# Patient Record
Sex: MALE | Race: WHITE | NOT HISPANIC OR LATINO | Employment: OTHER | URBAN - NONMETROPOLITAN AREA
[De-identification: names, ages, dates, MRNs, and addresses within clinical notes are randomized per-mention and may not be internally consistent; named-entity substitution may affect disease eponyms.]

---

## 2020-07-27 PROBLEM — R05.9 COUGH: Status: ACTIVE | Noted: 2020-07-27

## 2020-07-27 PROBLEM — Z20.822 EXPOSURE TO COVID-19 VIRUS: Status: ACTIVE | Noted: 2020-07-27

## 2020-07-30 ENCOUNTER — OFFICE VISIT (OUTPATIENT)
Dept: INTERNAL MEDICINE | Facility: CLINIC | Age: 80
End: 2020-07-30

## 2020-07-30 DIAGNOSIS — I10 ESSENTIAL HYPERTENSION: ICD-10-CM

## 2020-07-30 DIAGNOSIS — E53.8 COBALAMIN DEFICIENCY: ICD-10-CM

## 2020-07-30 DIAGNOSIS — Z12.5 ENCOUNTER FOR SCREENING FOR MALIGNANT NEOPLASM OF PROSTATE: ICD-10-CM

## 2020-07-30 DIAGNOSIS — R79.89 LOW VITAMIN D LEVEL: ICD-10-CM

## 2020-07-30 DIAGNOSIS — E03.9 ACQUIRED HYPOTHYROIDISM: ICD-10-CM

## 2020-07-30 DIAGNOSIS — E78.41 ELEVATED LIPOPROTEIN(A): Primary | ICD-10-CM

## 2020-07-30 PROCEDURE — 99213 OFFICE O/P EST LOW 20 MIN: CPT | Performed by: INTERNAL MEDICINE

## 2020-07-30 NOTE — PROGRESS NOTES
Subjective  You have chosen to receive care through a telephone visit. Do you consent to use a telephone visit for your medical care today? Yes    Patient ID: Cipriano Montes is a 80 y.o. male. Patient is here for management of multiple medical problems.     No chief complaint on file.    History of Present Illness       Pt with persistent pain in left side. Cough makes pain worse.  3 weeks of cough from post nasal drip.    No color.  Thick.    Better with mucinex and zyrtec.      No worseing soa.     soa if coughing a lot.     Using albuterol once a day.     Hx of asthma. Pt was living [place] x 4 years. Just returned to [place] July.       Seen in [facility]           The following portions of the patient's history were reviewed and updated as appropriate: allergies, current medications, past family history, past medical history, past social history, past surgical history and problem list.    Review of Systems   Constitutional: Negative for fatigue.   Respiratory: Negative for cough and shortness of breath.    Musculoskeletal: Negative for back pain and gait problem.   Psychiatric/Behavioral: Negative for self-injury and sleep disturbance. The patient is not nervous/anxious.    All other systems reviewed and are negative.      Current Outpatient Medications:   •  albuterol (VENTOLIN HFA) 108 (90 BASE) MCG/ACT inhaler, Ventolin  (90 Base) MCG/ACT Inhalation Aerosol Solution; Patient Sig: Ventolin  (90 Base) MCG/ACT Inhalation Aerosol Solution 1-2 pufff q 2-6 prn; 90; 3; 26-Mar-2013; Active, Disp: , Rfl:   •  cetirizine (ZYRTEC ALLERGY) 10 MG tablet, Take 1 tablet by mouth every morning. As needed, Disp: , Rfl:   •  Cyanocobalamin 2500 MCG sublingual tablet, Place 1 tablet under the tongue daily., Disp: , Rfl:   •  guaiFENesin (MUCINEX) 600 MG 12 hr tablet, Take 2 tablets by mouth 2 (two) times a day., Disp: , Rfl:   •  levothyroxine (SYNTHROID, LEVOTHROID) 100 MCG tablet, TAKE 1 TABLET BY MOUTH ONCE DAILY AS  "DIRECTED, Disp: 90 tablet, Rfl: 0  •  SBI/Protein Isolate (ENTERAGAM) 5 G pack, Take 1 packet by mouth daily. With 4 ounces of water, Disp: , Rfl:   •  Syringe/Needle, Disp, (B-D 3CC LUER-CAITLIN SYR 25GX1\") 25G X 1\" 3 ML misc, , Disp: , Rfl:   •  tamsulosin (FLOMAX) 0.4 MG capsule 24 hr capsule, Take 1 capsule by mouth daily., Disp: 90 capsule, Rfl: 3    Objective      There were no vitals taken for this visit.    Physical Exam     General Appearance:    Alert, cooperative, no distress, appears stated age   Head:     Eyes:     Ears:     Nose:    Throat:    Neck:    Back:      Lungs:      Chest wall:     Heart:     Abdomen:      Extremities:    Pulses:    Skin:    Lymph nodes:    Neurologic:       Results for orders placed or performed during the hospital encounter of 07/27/20   POC Urinalysis Dipstick, Multipro (Automated dipstick)   Result Value Ref Range    Color Winnie Yellow, Straw, Dark Yellow, Winnie    Clarity, UA Clear Clear    Glucose, UA Negative Negative, 1000 mg/dL (3+) mg/dL    Bilirubin Negative Negative    Ketones, UA Negative Negative    Specific Gravity  1.030 1.005 - 1.030    Blood, UA Negative Negative    pH, Urine 6.0 5.0 - 8.0    Protein, POC Negative Negative mg/dL    Urobilinogen, UA Normal Normal    Nitrite, UA Negative Negative    Leukocytes Negative Negative         Assessment/Plan     Pt wants to stay with the zyrtec.   Pt improving.     Afrin at night for drainage.            Diagnoses and all orders for this visit:    Elevated lipoprotein(a)  -     TSH  -     T4, Free  -     Comprehensive Metabolic Panel  -     Vitamin B12  -     CBC & Differential  -     Lipid Panel    Essential hypertension  -     TSH  -     T4, Free  -     Comprehensive Metabolic Panel  -     Vitamin B12  -     CBC & Differential  -     Lipid Panel    Low vitamin D level  -     TSH  -     T4, Free  -     Comprehensive Metabolic Panel  -     Vitamin B12  -     CBC & Differential  -     Lipid Panel    Acquired " hypothyroidism  -     TSH  -     T4, Free  -     Comprehensive Metabolic Panel  -     Vitamin B12  -     CBC & Differential  -     Lipid Panel    Cobalamin deficiency  -     TSH  -     T4, Free  -     Comprehensive Metabolic Panel  -     Vitamin B12  -     CBC & Differential  -     Lipid Panel    Encounter for screening for malignant neoplasm of prostate   -     PSA Screen    pt will wean off inj b12 and change to oral b12.       Pt will go to Dr Goncalves for rib head adjustment. [facility] with neg urin . covid test pending. Likely not covid.     Telephone time 12 min.    Return in about 6 weeks (around 9/10/2020).          There are no Patient Instructions on file for this visit.     Ron Velazquez MD    Assessment/Plan

## 2020-08-02 ENCOUNTER — TELEPHONE (OUTPATIENT)
Dept: URGENT CARE | Facility: CLINIC | Age: 80
End: 2020-08-02

## 2020-08-02 DIAGNOSIS — R10.9 ACUTE LEFT FLANK PAIN: Primary | ICD-10-CM

## 2020-08-02 DIAGNOSIS — N30.00 ACUTE CYSTITIS WITHOUT HEMATURIA: ICD-10-CM

## 2020-08-02 RX ORDER — CIPROFLOXACIN 500 MG/1
TABLET, FILM COATED ORAL
Qty: 14 TABLET | Refills: 0 | Status: SHIPPED | OUTPATIENT
Start: 2020-08-02 | End: 2020-08-29

## 2020-08-29 ENCOUNTER — OFFICE VISIT (OUTPATIENT)
Dept: INTERNAL MEDICINE | Facility: CLINIC | Age: 80
End: 2020-08-29

## 2020-08-29 VITALS
DIASTOLIC BLOOD PRESSURE: 78 MMHG | HEART RATE: 77 BPM | WEIGHT: 219.12 LBS | RESPIRATION RATE: 16 BRPM | TEMPERATURE: 98 F | HEIGHT: 70 IN | OXYGEN SATURATION: 98 % | BODY MASS INDEX: 31.37 KG/M2 | SYSTOLIC BLOOD PRESSURE: 134 MMHG

## 2020-08-29 DIAGNOSIS — J30.1 ALLERGIC RHINITIS DUE TO POLLEN, UNSPECIFIED SEASONALITY: Primary | ICD-10-CM

## 2020-08-29 DIAGNOSIS — I10 ESSENTIAL HYPERTENSION: ICD-10-CM

## 2020-08-29 DIAGNOSIS — Z87.81 HX OF FRACTURE OF ANKLE: ICD-10-CM

## 2020-08-29 DIAGNOSIS — R25.1 TREMOR: ICD-10-CM

## 2020-08-29 DIAGNOSIS — R53.83 FATIGUE, UNSPECIFIED TYPE: ICD-10-CM

## 2020-08-29 DIAGNOSIS — E78.41 ELEVATED LIPOPROTEIN(A): ICD-10-CM

## 2020-08-29 PROCEDURE — 99214 OFFICE O/P EST MOD 30 MIN: CPT | Performed by: INTERNAL MEDICINE

## 2020-08-29 PROCEDURE — 90653 IIV ADJUVANT VACCINE IM: CPT | Performed by: INTERNAL MEDICINE

## 2020-08-29 PROCEDURE — G0008 ADMIN INFLUENZA VIRUS VAC: HCPCS | Performed by: INTERNAL MEDICINE

## 2020-08-29 RX ORDER — NIFEDIPINE 60 MG/1
60 TABLET, EXTENDED RELEASE ORAL DAILY
Qty: 90 TABLET | Refills: 3 | Status: SHIPPED | OUTPATIENT
Start: 2020-08-29 | End: 2021-03-08 | Stop reason: SDUPTHER

## 2020-08-29 RX ORDER — NIFEDIPINE 60 MG/1
TABLET, EXTENDED RELEASE ORAL
COMMUNITY
End: 2020-08-29 | Stop reason: SDUPTHER

## 2020-08-29 RX ORDER — CYANOCOBALAMIN 1000 UG/ML
1000 INJECTION, SOLUTION INTRAMUSCULAR; SUBCUTANEOUS
COMMUNITY
End: 2022-05-11

## 2020-08-29 RX ORDER — ALBUTEROL SULFATE 90 UG/1
1 AEROSOL, METERED RESPIRATORY (INHALATION) EVERY 6 HOURS PRN
Qty: 1 G | Refills: 3 | Status: SHIPPED | OUTPATIENT
Start: 2020-08-29 | End: 2021-10-21

## 2020-08-29 NOTE — PROGRESS NOTES
Subjective     Patient ID: Cipriano Montes is a 80 y.o. male. Patient is here for management of multiple medical problems.     Chief Complaint   Patient presents with   • Hypothyroidism     follow-up   • Hypertension     follow-up     History of Present Illness       htn      hypthyroid      Just moved back for m [place]        Pain in side.  Chiropractor not helpful.      Recent uti. Had abx.      Recent r foot fx and had to have foot reattched.  Fell of scafolding feel and got a coumpound fx.  Om and IV abx.    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        The following portions of the patient's history were reviewed and updated as appropriate: allergies, current medications, past family history, past medical history, past social history, past surgical history and problem list.    Review of Systems   Constitutional: Positive for fatigue.   HENT: Positive for congestion, rhinorrhea and sinus pain.    Psychiatric/Behavioral: Negative for self-injury and sleep disturbance. The patient is not nervous/anxious and is not hyperactive.    All other systems reviewed and are negative.      Current Outpatient Medications:   •  albuterol sulfate HFA (Ventolin HFA) 108 (90 Base) MCG/ACT inhaler, Inhale 1 puff Every 6 (Six) Hours As Needed for Wheezing., Disp: 1 g, Rfl: 3  •  cetirizine (ZYRTEC ALLERGY) 10 MG tablet, Take 1 tablet by mouth every morning. As needed, Disp: , Rfl:   •  cyanocobalamin 1000 MCG/ML injection, Inject 1,000 mcg into the appropriate muscle as directed by prescriber Every 28 (Twenty-Eight) Days., Disp: , Rfl:   •  guaiFENesin (MUCINEX) 600 MG 12 hr tablet, Take 2 tablets by mouth 2 (two) times a day., Disp: , Rfl:   •  levothyroxine (SYNTHROID, LEVOTHROID) 100 MCG tablet, TAKE 1 TABLET BY MOUTH ONCE DAILY AS DIRECTED, Disp: 90 tablet, Rfl: 0  •  NIFEdipine XL (PROCARDIA XL) 60 MG 24 hr tablet, Take 1 tablet by mouth Daily., Disp: 90 tablet, Rfl: 3  •  Syringe/Needle, Disp, ([facility]  "SYR 25GX1\") 25G X 1\" 3 ML misc, , Disp: , Rfl:   •  tamsulosin (FLOMAX) 0.4 MG capsule 24 hr capsule, Take 1 capsule by mouth daily., Disp: 90 capsule, Rfl: 3    Objective      Blood pressure 134/78, pulse 77, temperature 98 °F (36.7 °C), temperature source Temporal, resp. rate 16, height 177.8 cm (70\"), weight 99.4 kg (219 lb 1.9 oz), SpO2 98 %.    Physical Exam     General Appearance:    Alert, cooperative, no distress, appears stated age   Head:    Normocephalic, without obvious abnormality, atraumatic   Eyes:    PERRL, conjunctiva/corneas clear, EOM's intact   Ears:    Normal TM's and external ear canals, both ears   Nose:   Nares normal, septum midline, mucosa normal, no drainage   or sinus tenderness   Throat:   Lips, mucosa, and tongue normal; teeth and gums normal   Neck:   Supple, symmetrical, trachea midline, no adenopathy;        thyroid:  No enlargement/tenderness/nodules; no carotid    bruit or JVD   Back:     Symmetric, no curvature, ROM normal, no CVA tenderness   Lungs:     Clear to auscultation bilaterally, respirations unlabored   Chest wall:    No tenderness or deformity   Heart:    Regular rate and rhythm, S1 and S2 normal, no murmur,        rub or gallop   Abdomen:     Soft, non-tender, bowel sounds active all four quadrants,     no masses, no organomegaly   Extremities:   Right ankle in brace.      Pulses:   2+ and symmetric all extremities   Skin:   Skin color, texture, turgor normal, no rashes or lesions   Lymph nodes:   Cervical, supraclavicular, and axillary nodes normal   Neurologic:   CNII-XII intact. Normal strength, sensation and reflexes       throughout      Results for orders placed or performed during the hospital encounter of 07/27/20   COVID-19,[facility] ROUTINE, NP/OP SWAB IN TRANSPORT MEDIA OR ESWAB 72 HR TAT - Swab, Oropharynx   Result Value Ref Range    SARS-CoV-2, MARY Not Detected Not Detected   Urine Culture - Urine, Urine, Clean Catch   Result Value Ref Range    Urine Culture " Final report (A)     Result 1 Comment (A)     Susceptibility Testing Comment    POC Urinalysis Dipstick, Multipro (Automated dipstick)   Result Value Ref Range    Color Winnie Yellow, Straw, Dark Yellow, Winnie    Clarity, UA Clear Clear    Glucose, UA Negative Negative, 1000 mg/dL (3+) mg/dL    Bilirubin Negative Negative    Ketones, UA Negative Negative    Specific Gravity  1.030 1.005 - 1.030    Blood, UA Negative Negative    pH, Urine 6.0 5.0 - 8.0    Protein, POC Negative Negative mg/dL    Urobilinogen, UA Normal Normal    Nitrite, UA Negative Negative    Leukocytes Negative Negative         Assessment/Plan   Pt need to get his labs done.        Cipriano was seen today for hypothyroidism and hypertension.    Diagnoses and all orders for this visit:    Allergic rhinitis due to pollen, unspecified seasonality  -     Vitamin B12  -     Vitamin B6  -     Vitamin B1, Whole Blood  -     [place] Spotted Fever, IgM  -     [place] Spotted Fever, IgG  -     Ehrlichia Antibody Panel  -     Lyme Disease, PCR    Hx of fracture of ankle  -     Vitamin B12  -     Vitamin B6  -     Vitamin B1, Whole Blood  -     [place] Spotted Fever, IgM  -     [place] Spotted Fever, IgG  -     Ehrlichia Antibody Panel  -     Lyme Disease, PCR    Essential hypertension  -     Vitamin B12  -     Vitamin B6  -     Vitamin B1, Whole Blood  -     [place] Spotted Fever, IgM  -     [place] Spotted Fever, IgG  -     Ehrlichia Antibody Panel  -     Lyme Disease, PCR    Elevated lipoprotein(a)  -     Vitamin B12  -     Vitamin B6  -     Vitamin B1, Whole Blood  -     [place] Spotted Fever, IgM  -     [place] Spotted Fever, IgG  -     Ehrlichia Antibody Panel  -     Lyme Disease, PCR    Fatigue, unspecified type  -     Vitamin B12  -     Vitamin B6  -     Vitamin B1, Whole Blood  -     [place] Spotted Fever, IgM  -     [place] Spotted Fever, IgG  -     Ehrlichia Antibody Panel  -     Lyme Disease, PCR    Tremor  -      Vitamin B12  -     Vitamin B6  -     Vitamin B1, Whole Blood  -     [place] Spotted Fever, IgM  -     [place] Spotted Fever, IgG  -     Ehrlichia Antibody Panel  -     Lyme Disease, PCR    Other orders  -     NIFEdipine XL (PROCARDIA XL) 60 MG 24 hr tablet; Take 1 tablet by mouth Daily.  -     albuterol sulfate HFA (Ventolin HFA) 108 (90 Base) MCG/ACT inhaler; Inhale 1 puff Every 6 (Six) Hours As Needed for Wheezing.  -     Fluad Quad 65+ yrs (8845-6079)      Return in about 6 weeks (around 10/10/2020).          There are no Patient Instructions on file for this visit.     Ron Velazquez MD    Assessment/Plan

## 2020-10-01 ENCOUNTER — TELEPHONE (OUTPATIENT)
Dept: INTERNAL MEDICINE | Facility: CLINIC | Age: 80
End: 2020-10-01

## 2020-10-01 NOTE — TELEPHONE ENCOUNTER
Caller: Cipriano Montes    Relationship: Self    Best call back number: [phone]    What orders are you requesting (i.e. lab or imaging): BLOOD WORK    In what timeframe would the patient need to come in: SOON    Where will you receive your lab/imaging services: [facility]    Additional notes: PATIENT'S APPOINTMENT IS 10/27

## 2020-10-05 ENCOUNTER — LAB (OUTPATIENT)
Dept: LAB | Facility: HOSPITAL | Age: 80
End: 2020-10-05

## 2020-10-05 LAB
ALBUMIN SERPL-MCNC: 4.1 G/DL (ref 3.5–5.2)
ALBUMIN/GLOB SERPL: 1.3 G/DL
ALP SERPL-CCNC: 137 U/L (ref 39–117)
ALT SERPL W P-5'-P-CCNC: 89 U/L (ref 1–41)
ANION GAP SERPL CALCULATED.3IONS-SCNC: 9.4 MMOL/L (ref 5–15)
AST SERPL-CCNC: 56 U/L (ref 1–40)
BASOPHILS # BLD AUTO: 0.06 10*3/MM3 (ref 0–0.2)
BASOPHILS NFR BLD AUTO: 0.9 % (ref 0–1.5)
BILIRUB SERPL-MCNC: 0.6 MG/DL (ref 0–1.2)
BUN SERPL-MCNC: 16 MG/DL (ref 8–23)
BUN/CREAT SERPL: 12.9 (ref 7–25)
CALCIUM SPEC-SCNC: 9.3 MG/DL (ref 8.6–10.5)
CHLORIDE SERPL-SCNC: 108 MMOL/L (ref 98–107)
CHOLEST SERPL-MCNC: 191 MG/DL (ref 0–200)
CO2 SERPL-SCNC: 24.6 MMOL/L (ref 22–29)
CREAT SERPL-MCNC: 1.24 MG/DL (ref 0.76–1.27)
DEPRECATED RDW RBC AUTO: 41.9 FL (ref 37–54)
EOSINOPHIL # BLD AUTO: 0.25 10*3/MM3 (ref 0–0.4)
EOSINOPHIL NFR BLD AUTO: 3.7 % (ref 0.3–6.2)
ERYTHROCYTE [DISTWIDTH] IN BLOOD BY AUTOMATED COUNT: 13.3 % (ref 12.3–15.4)
GFR SERPL CREATININE-BSD FRML MDRD: 56 ML/MIN/1.73
GLOBULIN UR ELPH-MCNC: 3.2 GM/DL
GLUCOSE SERPL-MCNC: 105 MG/DL (ref 65–99)
HCT VFR BLD AUTO: 47.4 % (ref 37.5–51)
HDLC SERPL-MCNC: 46 MG/DL (ref 40–60)
HGB BLD-MCNC: 16 G/DL (ref 13–17.7)
IMM GRANULOCYTES # BLD AUTO: 0.01 10*3/MM3 (ref 0–0.05)
IMM GRANULOCYTES NFR BLD AUTO: 0.1 % (ref 0–0.5)
LDLC SERPL CALC-MCNC: 124 MG/DL (ref 0–100)
LDLC/HDLC SERPL: 2.7 {RATIO}
LYMPHOCYTES # BLD AUTO: 1.32 10*3/MM3 (ref 0.7–3.1)
LYMPHOCYTES NFR BLD AUTO: 19.4 % (ref 19.6–45.3)
MCH RBC QN AUTO: 29.6 PG (ref 26.6–33)
MCHC RBC AUTO-ENTMCNC: 33.8 G/DL (ref 31.5–35.7)
MCV RBC AUTO: 87.6 FL (ref 79–97)
MONOCYTES # BLD AUTO: 0.7 10*3/MM3 (ref 0.1–0.9)
MONOCYTES NFR BLD AUTO: 10.3 % (ref 5–12)
NEUTROPHILS NFR BLD AUTO: 4.45 10*3/MM3 (ref 1.7–7)
NEUTROPHILS NFR BLD AUTO: 65.6 % (ref 42.7–76)
NRBC BLD AUTO-RTO: 0 /100 WBC (ref 0–0.2)
PLATELET # BLD AUTO: 208 10*3/MM3 (ref 140–450)
PMV BLD AUTO: 10.2 FL (ref 6–12)
POTASSIUM SERPL-SCNC: 4.2 MMOL/L (ref 3.5–5.2)
PROT SERPL-MCNC: 7.3 G/DL (ref 6–8.5)
PSA SERPL-MCNC: 0.71 NG/ML (ref 0–4)
RBC # BLD AUTO: 5.41 10*6/MM3 (ref 4.14–5.8)
SODIUM SERPL-SCNC: 142 MMOL/L (ref 136–145)
T4 FREE SERPL-MCNC: 1.41 NG/DL (ref 0.93–1.7)
TRIGL SERPL-MCNC: 105 MG/DL (ref 0–150)
VIT B12 BLD-MCNC: 409 PG/ML (ref 211–946)
VLDLC SERPL-MCNC: 21 MG/DL (ref 5–40)
WBC # BLD AUTO: 6.79 10*3/MM3 (ref 3.4–10.8)

## 2020-10-05 PROCEDURE — 36415 COLL VENOUS BLD VENIPUNCTURE: CPT | Performed by: INTERNAL MEDICINE

## 2020-10-05 PROCEDURE — 85025 COMPLETE CBC W/AUTO DIFF WBC: CPT | Performed by: INTERNAL MEDICINE

## 2020-10-05 PROCEDURE — 80061 LIPID PANEL: CPT | Performed by: INTERNAL MEDICINE

## 2020-10-05 PROCEDURE — 86666 EHRLICHIA ANTIBODY: CPT | Performed by: INTERNAL MEDICINE

## 2020-10-05 PROCEDURE — 82607 VITAMIN B-12: CPT | Performed by: INTERNAL MEDICINE

## 2020-10-05 PROCEDURE — 84425 ASSAY OF VITAMIN B-1: CPT | Performed by: INTERNAL MEDICINE

## 2020-10-05 PROCEDURE — G0103 PSA SCREENING: HCPCS | Performed by: INTERNAL MEDICINE

## 2020-10-05 PROCEDURE — 87476 LYME DIS DNA AMP PROBE: CPT | Performed by: INTERNAL MEDICINE

## 2020-10-05 PROCEDURE — 84439 ASSAY OF FREE THYROXINE: CPT | Performed by: INTERNAL MEDICINE

## 2020-10-05 PROCEDURE — 80053 COMPREHEN METABOLIC PANEL: CPT | Performed by: INTERNAL MEDICINE

## 2020-10-05 PROCEDURE — 86757 RICKETTSIA ANTIBODY: CPT | Performed by: INTERNAL MEDICINE

## 2020-10-06 LAB
R RICKETTSI IGG SER QL IA: NEGATIVE
SPECIMEN STATUS: NORMAL

## 2020-10-07 ENCOUNTER — OFFICE VISIT (OUTPATIENT)
Dept: INTERNAL MEDICINE | Facility: CLINIC | Age: 80
End: 2020-10-07

## 2020-10-07 VITALS
OXYGEN SATURATION: 97 % | SYSTOLIC BLOOD PRESSURE: 133 MMHG | HEIGHT: 70 IN | HEART RATE: 99 BPM | RESPIRATION RATE: 16 BRPM | TEMPERATURE: 98.2 F | BODY MASS INDEX: 32.09 KG/M2 | WEIGHT: 224.12 LBS | DIASTOLIC BLOOD PRESSURE: 78 MMHG

## 2020-10-07 DIAGNOSIS — R74.8 ELEVATED LIVER ENZYMES: ICD-10-CM

## 2020-10-07 DIAGNOSIS — J06.9 UPPER RESPIRATORY TRACT INFECTION, UNSPECIFIED TYPE: Primary | ICD-10-CM

## 2020-10-07 DIAGNOSIS — R05.9 COUGH: ICD-10-CM

## 2020-10-07 DIAGNOSIS — J18.9 COMMUNITY ACQUIRED PNEUMONIA OF RIGHT MIDDLE LOBE OF LUNG: ICD-10-CM

## 2020-10-07 DIAGNOSIS — E03.9 ACQUIRED HYPOTHYROIDISM: ICD-10-CM

## 2020-10-07 LAB
A PHAGOCYTOPH IGG TITR SER IF: NEGATIVE {TITER}
A PHAGOCYTOPH IGM TITR SER IF: NEGATIVE {TITER}
E CHAFFEENSIS IGG TITR SER IF: NEGATIVE {TITER}
E CHAFFEENSIS IGM TITR SER IF: NEGATIVE {TITER}

## 2020-10-07 PROCEDURE — 99214 OFFICE O/P EST MOD 30 MIN: CPT | Performed by: INTERNAL MEDICINE

## 2020-10-07 RX ORDER — IPRATROPIUM BROMIDE 42 UG/1
2 SPRAY, METERED NASAL
Qty: 15 ML | Refills: 12 | Status: ON HOLD | OUTPATIENT
Start: 2020-10-07 | End: 2022-09-30

## 2020-10-07 RX ORDER — TAMSULOSIN HYDROCHLORIDE 0.4 MG/1
1 CAPSULE ORAL DAILY
Qty: 90 CAPSULE | Refills: 3 | Status: SHIPPED | OUTPATIENT
Start: 2020-10-07 | End: 2021-10-14

## 2020-10-07 RX ORDER — LEVOTHYROXINE SODIUM 0.1 MG/1
100 TABLET ORAL DAILY
Qty: 90 TABLET | Refills: 3 | Status: SHIPPED | OUTPATIENT
Start: 2020-10-07 | End: 2021-10-14

## 2020-10-07 RX ORDER — DOXYCYCLINE 100 MG/1
100 CAPSULE ORAL EVERY 12 HOURS SCHEDULED
Qty: 28 CAPSULE | Refills: 0 | Status: SHIPPED | OUTPATIENT
Start: 2020-10-07 | End: 2020-11-09

## 2020-10-07 NOTE — PROGRESS NOTES
"Subjective     Patient ID: Cipriano Montes is a 80 y.o. male. Patient is here for management of multiple medical problems.     Chief Complaint   Patient presents with   • Fatigue     follow-up   • sinus drainage     patient having increased sinus drainage, patient has tried Mucinex and Zyrtec     History of Present Illness       Sinus drainge and pain in pressure.  Pnd. At night worse.  Cough violent.  Soa.  Pain in mid back pain with coughing.    Using zyrtec.    flonase      The following portions of the patient's history were reviewed and updated as appropriate: allergies, current medications, past family history, past medical history, past social history, past surgical history and problem list.    Review of Systems   Constitutional: Positive for fatigue.   Respiratory: Positive for cough and shortness of breath.    Psychiatric/Behavioral: Positive for sleep disturbance. Negative for self-injury. The patient is not nervous/anxious and is not hyperactive.    All other systems reviewed and are negative.      Current Outpatient Medications:   •  albuterol sulfate HFA (Ventolin HFA) 108 (90 Base) MCG/ACT inhaler, Inhale 1 puff Every 6 (Six) Hours As Needed for Wheezing., Disp: 1 g, Rfl: 3  •  cetirizine (ZYRTEC ALLERGY) 10 MG tablet, Take 1 tablet by mouth every morning. As needed, Disp: , Rfl:   •  cyanocobalamin 1000 MCG/ML injection, Inject 1,000 mcg into the appropriate muscle as directed by prescriber Every 28 (Twenty-Eight) Days., Disp: , Rfl:   •  guaiFENesin (MUCINEX) 600 MG 12 hr tablet, Take 2 tablets by mouth 2 (two) times a day., Disp: , Rfl:   •  levothyroxine (SYNTHROID, LEVOTHROID) 100 MCG tablet, Take 1 tablet by mouth Daily., Disp: 90 tablet, Rfl: 3  •  NIFEdipine XL (PROCARDIA XL) 60 MG 24 hr tablet, Take 1 tablet by mouth Daily., Disp: 90 tablet, Rfl: 3  •  Syringe/Needle, Disp, (B-D 3CC LUER-CAITLIN SYR 25GX1\") 25G X 1\" 3 ML misc, , Disp: , Rfl:   •  tamsulosin (FLOMAX) 0.4 MG capsule 24 hr " "capsule, Take 1 capsule by mouth Daily., Disp: 90 capsule, Rfl: 3  •  doxycycline (MONODOX) 100 MG capsule, Take 1 capsule by mouth Every 12 (Twelve) Hours., Disp: 28 capsule, Rfl: 0  •  ipratropium (ATROVENT) 0.06 % nasal spray, 2 sprays into the nostril(s) as directed by provider every night at bedtime., Disp: 15 mL, Rfl: 12    Objective      Blood pressure 133/78, pulse 99, temperature 98.2 °F (36.8 °C), temperature source Temporal, resp. rate 16, height 177.8 cm (70\"), weight 102 kg (224 lb 1.9 oz), SpO2 97 %.    Physical Exam     General Appearance:    Alert, cooperative, no distress, appears stated age   Head:    Normocephalic, without obvious abnormality, atraumatic   Eyes:    PERRL, conjunctiva/corneas clear, EOM's intact   Ears:    Normal TM's and external ear canals, both ears   Nose:   Nares normal, septum midline, mucosa normal, no drainage   + sinus tenderness   Throat:   Lips, mucosa, and tongue normal; teeth and gums normal   Neck:   Supple, symmetrical, trachea midline, no adenopathy;        thyroid:  No enlargement/tenderness/nodules; no carotid    bruit or JVD   Back:     Symmetric, no curvature, ROM normal, no CVA tenderness   Lungs:     Clear to auscultation bilaterally, respirations unlabored   Chest wall:    No tenderness or deformity   Heart:    Regular rate and rhythm, S1 and S2 normal, no murmur,        rub or gallop   Abdomen:     Soft, non-tender, bowel sounds active all four quadrants,     no masses, no organomegaly   Extremities:   Extremities normal, atraumatic, no cyanosis or edema   Pulses:   2+ and symmetric all extremities   Skin:   Skin color, texture, turgor normal, no rashes or lesions   Lymph nodes:   Cervical, supraclavicular, and axillary nodes normal   Neurologic:   CNII-XII intact. Normal strength, sensation and reflexes       throughout      Results for orders placed or performed in visit on 08/29/20   [facility] Spotted Fever, IgG    Specimen: Blood   Result Value Ref Range "    RMSF IgG Negative Negative   Lyme Disease, PCR    Specimen: Lumbar Puncture; Cerebrospinal Fluid   Result Value Ref Range    Lyme Disease(B.burgdorferi)PCR     Specimen Status Report    Specimen: Lumbar Puncture; Cerebrospinal Fluid   Result Value Ref Range    Specimen Status Comment          Assessment/Plan       Cipriano was seen today for fatigue and sinus drainage.    Diagnoses and all orders for this visit:    Upper respiratory tract infection, unspecified type  -     ipratropium (ATROVENT) 0.06 % nasal spray; 2 sprays into the nostril(s) as directed by provider every night at bedtime.    Cough    Elevated liver enzymes  -     Comprehensive Metabolic Panel  -     US Liver; Future    Community acquired pneumonia of right middle lobe of lung  -     doxycycline (MONODOX) 100 MG capsule; Take 1 capsule by mouth Every 12 (Twelve) Hours.    Acquired hypothyroidism  -     levothyroxine (SYNTHROID, LEVOTHROID) 100 MCG tablet; Take 1 tablet by mouth Daily.    Other orders  -     tamsulosin (FLOMAX) 0.4 MG capsule 24 hr capsule; Take 1 capsule by mouth Daily.      Return in about 4 weeks (around 11/4/2020).          Patient Instructions   Afrin otc at night only.             Ron Velazquez MD    Assessment/Plan

## 2020-10-08 LAB
B BURGDOR DNA SPEC QL NAA+PROBE: NEGATIVE
R RICKETTSI IGM SER-ACNC: 0.97 INDEX (ref 0–0.89)

## 2020-10-09 LAB — VIT B1 BLD-SCNC: 126.9 NMOL/L (ref 66.5–200)

## 2020-10-13 ENCOUNTER — APPOINTMENT (OUTPATIENT)
Dept: GENERAL RADIOLOGY | Facility: HOSPITAL | Age: 80
End: 2020-10-13

## 2020-10-13 ENCOUNTER — HOSPITAL ENCOUNTER (EMERGENCY)
Facility: HOSPITAL | Age: 80
Discharge: HOME | End: 2020-10-13
Attending: EMERGENCY MEDICINE | Admitting: EMERGENCY MEDICINE

## 2020-10-13 VITALS
HEART RATE: 85 BPM | TEMPERATURE: 97.4 F | WEIGHT: 220 LBS | OXYGEN SATURATION: 96 % | BODY MASS INDEX: 31.5 KG/M2 | DIASTOLIC BLOOD PRESSURE: 78 MMHG | RESPIRATION RATE: 24 BRPM | HEIGHT: 70 IN | SYSTOLIC BLOOD PRESSURE: 132 MMHG

## 2020-10-13 DIAGNOSIS — J45.21 MILD INTERMITTENT ASTHMA WITH EXACERBATION: Primary | ICD-10-CM

## 2020-10-13 LAB
A-A DO2: 67.1 MMHG
ALBUMIN SERPL-MCNC: 4.6 G/DL (ref 3.5–5.2)
ALBUMIN/GLOB SERPL: 1.5 G/DL
ALP SERPL-CCNC: 137 U/L (ref 39–117)
ALT SERPL W P-5'-P-CCNC: 78 U/L (ref 1–41)
ANION GAP SERPL CALCULATED.3IONS-SCNC: 11.7 MMOL/L (ref 5–15)
ARTERIAL PATENCY WRIST A: POSITIVE
AST SERPL-CCNC: 42 U/L (ref 1–40)
ATMOSPHERIC PRESS: 738 MMHG
BASE EXCESS BLDA CALC-SCNC: -4.2 MMOL/L (ref 0–2)
BASOPHILS # BLD AUTO: 0.04 10*3/MM3 (ref 0–0.2)
BASOPHILS NFR BLD AUTO: 0.5 % (ref 0–1.5)
BDY SITE: ABNORMAL
BILIRUB SERPL-MCNC: 0.5 MG/DL (ref 0–1.2)
BUN SERPL-MCNC: 17 MG/DL (ref 8–23)
BUN/CREAT SERPL: 13.7 (ref 7–25)
CALCIUM SPEC-SCNC: 9.5 MG/DL (ref 8.6–10.5)
CHLORIDE SERPL-SCNC: 107 MMOL/L (ref 98–107)
CO2 SERPL-SCNC: 22.3 MMOL/L (ref 22–29)
COHGB MFR BLD: <0.6 % (ref 0–2)
CREAT SERPL-MCNC: 1.24 MG/DL (ref 0.76–1.27)
DEPRECATED RDW RBC AUTO: 44.1 FL (ref 37–54)
EOSINOPHIL # BLD AUTO: 0.18 10*3/MM3 (ref 0–0.4)
EOSINOPHIL NFR BLD AUTO: 2.1 % (ref 0.3–6.2)
ERYTHROCYTE [DISTWIDTH] IN BLOOD BY AUTOMATED COUNT: 13.6 % (ref 12.3–15.4)
GAS FLOW AIRWAY: 2 LPM
GFR SERPL CREATININE-BSD FRML MDRD: 56 ML/MIN/1.73
GLOBULIN UR ELPH-MCNC: 3.1 GM/DL
GLUCOSE SERPL-MCNC: 134 MG/DL (ref 65–99)
HCO3 BLDA-SCNC: 20.9 MMOL/L (ref 22–28)
HCT VFR BLD AUTO: 50.7 % (ref 37.5–51)
HCT VFR BLD CALC: 51.9 %
HGB BLD-MCNC: 17.1 G/DL (ref 13–17.7)
HOLD SPECIMEN: NORMAL
HOLD SPECIMEN: NORMAL
IMM GRANULOCYTES # BLD AUTO: 0.02 10*3/MM3 (ref 0–0.05)
IMM GRANULOCYTES NFR BLD AUTO: 0.2 % (ref 0–0.5)
INHALED O2 CONCENTRATION: 28 %
LYMPHOCYTES # BLD AUTO: 1.79 10*3/MM3 (ref 0.7–3.1)
LYMPHOCYTES NFR BLD AUTO: 20.7 % (ref 19.6–45.3)
MCH RBC QN AUTO: 29.9 PG (ref 26.6–33)
MCHC RBC AUTO-ENTMCNC: 33.7 G/DL (ref 31.5–35.7)
MCV RBC AUTO: 88.6 FL (ref 79–97)
METHGB BLD QL: 0.9 % (ref 0–1.5)
MODALITY: ABNORMAL
MONOCYTES # BLD AUTO: 0.74 10*3/MM3 (ref 0.1–0.9)
MONOCYTES NFR BLD AUTO: 8.6 % (ref 5–12)
NEUTROPHILS NFR BLD AUTO: 5.88 10*3/MM3 (ref 1.7–7)
NEUTROPHILS NFR BLD AUTO: 67.9 % (ref 42.7–76)
NOTE: ABNORMAL
NRBC BLD AUTO-RTO: 0 /100 WBC (ref 0–0.2)
NT-PROBNP SERPL-MCNC: 22.4 PG/ML (ref 0–1800)
OXYHGB MFR BLDV: 94.4 % (ref 94–99)
PCO2 BLDA: 38 MM HG (ref 35–45)
PCO2 TEMP ADJ BLD: ABNORMAL MM[HG]
PH BLDA: 7.35 PH UNITS (ref 7.3–7.5)
PH, TEMP CORRECTED: ABNORMAL
PLATELET # BLD AUTO: 229 10*3/MM3 (ref 140–450)
PMV BLD AUTO: 8.8 FL (ref 6–12)
PO2 BLDA: 84 MM HG (ref 75–100)
PO2 TEMP ADJ BLD: ABNORMAL MM[HG]
POTASSIUM SERPL-SCNC: 4.4 MMOL/L (ref 3.5–5.2)
PROT SERPL-MCNC: 7.7 G/DL (ref 6–8.5)
RBC # BLD AUTO: 5.72 10*6/MM3 (ref 4.14–5.8)
SAO2 % BLDCOA: 95.4 % (ref 94–100)
SODIUM SERPL-SCNC: 141 MMOL/L (ref 136–145)
TROPONIN T SERPL-MCNC: <0.01 NG/ML (ref 0–0.03)
VENTILATOR MODE: ABNORMAL
WBC # BLD AUTO: 8.65 10*3/MM3 (ref 3.4–10.8)
WHOLE BLOOD HOLD SPECIMEN: NORMAL
WHOLE BLOOD HOLD SPECIMEN: NORMAL

## 2020-10-13 PROCEDURE — 94799 UNLISTED PULMONARY SVC/PX: CPT

## 2020-10-13 PROCEDURE — 36600 WITHDRAWAL OF ARTERIAL BLOOD: CPT

## 2020-10-13 PROCEDURE — 82375 ASSAY CARBOXYHB QUANT: CPT

## 2020-10-13 PROCEDURE — 71046 X-RAY EXAM CHEST 2 VIEWS: CPT

## 2020-10-13 PROCEDURE — 83880 ASSAY OF NATRIURETIC PEPTIDE: CPT | Performed by: EMERGENCY MEDICINE

## 2020-10-13 PROCEDURE — 94640 AIRWAY INHALATION TREATMENT: CPT

## 2020-10-13 PROCEDURE — 99284 EMERGENCY DEPT VISIT MOD MDM: CPT

## 2020-10-13 PROCEDURE — 83050 HGB METHEMOGLOBIN QUAN: CPT

## 2020-10-13 PROCEDURE — 80053 COMPREHEN METABOLIC PANEL: CPT | Performed by: EMERGENCY MEDICINE

## 2020-10-13 PROCEDURE — 85025 COMPLETE CBC W/AUTO DIFF WBC: CPT | Performed by: EMERGENCY MEDICINE

## 2020-10-13 PROCEDURE — 96374 THER/PROPH/DIAG INJ IV PUSH: CPT

## 2020-10-13 PROCEDURE — 93005 ELECTROCARDIOGRAM TRACING: CPT | Performed by: EMERGENCY MEDICINE

## 2020-10-13 PROCEDURE — 25010000002 METHYLPREDNISOLONE PER 125 MG: Performed by: PHYSICIAN ASSISTANT

## 2020-10-13 PROCEDURE — 82805 BLOOD GASES W/O2 SATURATION: CPT

## 2020-10-13 PROCEDURE — 84484 ASSAY OF TROPONIN QUANT: CPT | Performed by: EMERGENCY MEDICINE

## 2020-10-13 RX ORDER — METHYLPREDNISOLONE SODIUM SUCCINATE 125 MG/2ML
125 INJECTION, POWDER, LYOPHILIZED, FOR SOLUTION INTRAMUSCULAR; INTRAVENOUS ONCE
Status: COMPLETED | OUTPATIENT
Start: 2020-10-13 | End: 2020-10-13

## 2020-10-13 RX ORDER — PREDNISONE 20 MG/1
20 TABLET ORAL 2 TIMES DAILY
Qty: 10 TABLET | Refills: 0 | Status: SHIPPED | OUTPATIENT
Start: 2020-10-13 | End: 2020-10-18

## 2020-10-13 RX ORDER — SODIUM CHLORIDE 0.9 % (FLUSH) 0.9 %
10 SYRINGE (ML) INJECTION AS NEEDED
Status: DISCONTINUED | OUTPATIENT
Start: 2020-10-13 | End: 2020-10-13 | Stop reason: HOSPADM

## 2020-10-13 RX ORDER — IPRATROPIUM BROMIDE AND ALBUTEROL SULFATE 2.5; .5 MG/3ML; MG/3ML
3 SOLUTION RESPIRATORY (INHALATION) ONCE
Status: COMPLETED | OUTPATIENT
Start: 2020-10-13 | End: 2020-10-13

## 2020-10-13 RX ADMIN — METHYLPREDNISOLONE SODIUM SUCCINATE 125 MG: 125 INJECTION, POWDER, FOR SOLUTION INTRAMUSCULAR; INTRAVENOUS at 12:21

## 2020-10-13 RX ADMIN — IPRATROPIUM BROMIDE AND ALBUTEROL SULFATE 3 ML: .5; 3 SOLUTION RESPIRATORY (INHALATION) at 11:29

## 2020-10-13 NOTE — ED PROVIDER NOTES
Subjective   80-year-old male presents with shortness of breath, he has a history of asthma and gets exacerbations from time to time, he uses an albuterol inhaler at home, but not oxygen.  He states he got up this morning and was working outside in his garden, and became short of breath, he tried to use his inhaler with no improvement.  He also is currently being treated for pneumonia with antibiotics.  Symptoms are worse with any kind of exertion.       History provided by:  Patient   used: No        Review of Systems   Respiratory: Positive for chest tightness, shortness of breath and wheezing.    All other systems reviewed and are negative.      Past Medical History:   Diagnosis Date   • Hypertension    • Hypothyroidism    • Thyroid disorder        Allergies   Allergen Reactions   • Oxycodone-Acetaminophen        Past Surgical History:   Procedure Laterality Date   • COLON SURGERY     • TONSILLECTOMY     • TONSILLECTOMY         Family History   Problem Relation Age of Onset   • Cancer Other    • Diabetes Other    • Prostate cancer Other    • Leukemia Mother    • Diabetes Father        Social History     Socioeconomic History   • Marital status:      Spouse name: Not on file   • Number of children: Not on file   • Years of education: Not on file   • Highest education level: Not on file   Tobacco Use   • Smoking status: Never Smoker   • Smokeless tobacco: Never Used   Substance and Sexual Activity   • Alcohol use: Yes     Comment: unknown   • Drug use: No           Objective   Physical Exam  Vitals signs and nursing note reviewed.   Constitutional:       Appearance: He is well-developed.   HENT:      Head: Normocephalic and atraumatic.   Cardiovascular:      Rate and Rhythm: Normal rate and regular rhythm.   Pulmonary:      Effort: Pulmonary effort is normal.      Breath sounds: Decreased breath sounds and wheezing present.   Abdominal:      General: Bowel sounds are normal.       Palpations: Abdomen is soft.   Musculoskeletal: Normal range of motion.   Skin:     General: Skin is warm and dry.   Neurological:      Mental Status: He is alert and oriented to person, place, and time.   Psychiatric:         Behavior: Behavior normal.         Thought Content: Thought content normal.         Judgment: Judgment normal.         Procedures           ED Course  ED Course as of Oct 13 1250   Tue Oct 13, 2020   1125 EKG interpreted by me reveals sinus tachycardia 104.  No ectopy no ischemic changes.    [PF]      ED Course User Index  [PF] Randy Rubio, DO                                           MDM  Number of Diagnoses or Management Options  Mild intermittent asthma with exacerbation: new and requires workup     Amount and/or Complexity of Data Reviewed  Clinical lab tests: reviewed  Tests in the radiology section of CPT®: reviewed    Risk of Complications, Morbidity, and/or Mortality  Presenting problems: minimal  Diagnostic procedures: minimal  Management options: minimal    Patient Progress  Patient progress: stable      Final diagnoses:   Mild intermittent asthma with exacerbation            Vic Guillen Jr., DANIEL  10/13/20 [id number]

## 2020-10-15 ENCOUNTER — HOSPITAL ENCOUNTER (OUTPATIENT)
Dept: ULTRASOUND IMAGING | Facility: HOSPITAL | Age: 80
Discharge: HOME | End: 2020-10-15
Admitting: INTERNAL MEDICINE

## 2020-10-15 DIAGNOSIS — R74.8 ELEVATED LIVER ENZYMES: ICD-10-CM

## 2020-10-15 PROCEDURE — 76705 ECHO EXAM OF ABDOMEN: CPT

## 2020-10-15 NOTE — PROGRESS NOTES
Please let them know the us liver is with fatty liver. Wt loss.  Stop all foods with high fructose corn syrup.

## 2020-11-03 ENCOUNTER — TELEPHONE (OUTPATIENT)
Dept: INTERNAL MEDICINE | Facility: CLINIC | Age: 80
End: 2020-11-03

## 2020-11-03 NOTE — TELEPHONE ENCOUNTER
PT WOULD LIKE TO KNOW IF HE WILL BE HAVING LABS DONE ON HIS APPT 11/9.      PLEASE ADVISE  [phone]

## 2020-11-09 ENCOUNTER — OFFICE VISIT (OUTPATIENT)
Dept: INTERNAL MEDICINE | Facility: CLINIC | Age: 80
End: 2020-11-09

## 2020-11-09 VITALS
OXYGEN SATURATION: 98 % | HEART RATE: 74 BPM | SYSTOLIC BLOOD PRESSURE: 123 MMHG | WEIGHT: 211.8 LBS | TEMPERATURE: 98.2 F | DIASTOLIC BLOOD PRESSURE: 64 MMHG | HEIGHT: 70 IN | RESPIRATION RATE: 16 BRPM | BODY MASS INDEX: 30.32 KG/M2

## 2020-11-09 DIAGNOSIS — K76.0 NAFLD (NONALCOHOLIC FATTY LIVER DISEASE): Primary | ICD-10-CM

## 2020-11-09 DIAGNOSIS — Z00.00 ROUTINE GENERAL MEDICAL EXAMINATION AT A HEALTH CARE FACILITY: ICD-10-CM

## 2020-11-09 DIAGNOSIS — G89.29 CHRONIC PAIN OF RIGHT ANKLE: ICD-10-CM

## 2020-11-09 DIAGNOSIS — M25.571 CHRONIC PAIN OF RIGHT ANKLE: ICD-10-CM

## 2020-11-09 PROCEDURE — 99213 OFFICE O/P EST LOW 20 MIN: CPT | Performed by: INTERNAL MEDICINE

## 2020-11-09 PROCEDURE — G0439 PPPS, SUBSEQ VISIT: HCPCS | Performed by: INTERNAL MEDICINE

## 2020-11-09 RX ORDER — SENNOSIDES 8.6 MG
CAPSULE ORAL EVERY 8 HOURS PRN
COMMUNITY

## 2020-11-09 NOTE — PROGRESS NOTES
"QUICK REFERENCE INFORMATION:  The ABCs of the Annual Wellness Visit    Medicare Annual Wellness Visit    Subjective   History of Present Illness    Cipriano Montes is a 80 y.o. male who presents for an Annual Wellness Visit. In addition, we addressed the following health issues nafld.    Chronic pain 3/10      PMH, PSH, SocHx, FamHx, Allergies, and Medications: Reviewed and updated.     Outpatient Medications Prior to Visit   Medication Sig Dispense Refill   • acetaminophen (Tylenol 8 Hour) 650 MG 8 hr tablet Every 6 (Six) Hours.     • albuterol sulfate HFA (Ventolin HFA) 108 (90 Base) MCG/ACT inhaler Inhale 1 puff Every 6 (Six) Hours As Needed for Wheezing. 1 g 3   • cetirizine (ZYRTEC ALLERGY) 10 MG tablet Take 1 tablet by mouth every morning. As needed     • cyanocobalamin 1000 MCG/ML injection Inject 1,000 mcg into the appropriate muscle as directed by prescriber Every 28 (Twenty-Eight) Days.     • ipratropium (ATROVENT) 0.06 % nasal spray 2 sprays into the nostril(s) as directed by provider every night at bedtime. 15 mL 12   • levothyroxine (SYNTHROID, LEVOTHROID) 100 MCG tablet Take 1 tablet by mouth Daily. 90 tablet 3   • Liver Extract (LIVER PO) Take 1 capsule by mouth 2 (two) times a day.     • NIFEdipine XL (PROCARDIA XL) 60 MG 24 hr tablet Take 1 tablet by mouth Daily. 90 tablet 3   • Probiotic Product (PROBIOTIC-10 PO) Probiotic   1 capsule daily     • Syringe/Needle, Disp, (B-D 3CC LUER-CAITLIN SYR 25GX1\") 25G X 1\" 3 ML misc      • tamsulosin (FLOMAX) 0.4 MG capsule 24 hr capsule Take 1 capsule by mouth Daily. 90 capsule 3   • doxycycline (MONODOX) 100 MG capsule Take 1 capsule by mouth Every 12 (Twelve) Hours. 28 capsule 0   • guaiFENesin (MUCINEX) 600 MG 12 hr tablet Take 2 tablets by mouth 2 (two) times a day.       No facility-administered medications prior to visit.        Patient Active Problem List   Diagnosis   • Hypothyroidism   • Hypertension   • Hyperlipidemia   • Hypoxia   • Benign prostatic " hypertrophy without urinary obstruction   • Asthma   • Acute renal failure (CMS/HCC)   • Cobalamin deficiency   • Vitamin D deficiency   • Dyspnea on exertion   • Moderate persistent asthma   • Cough   • Exposure to COVID-19 virus   • Hx of fracture of ankle       Health Habits:  Dental Exam. up to date  Eye Exam. up to date  No exam data present  Exercise: 7 times/week.  Current exercise activities include: aerobics and walking    Health Risk Assessment:  The patient has completed a Health Risk Assessment. This has been reviewed with them and has been scanned into the patient's chart.    Current Medical Providers:  Patient Care Team:  Ron Velazquez MD as PCP - General    The [facility] providers who are involved in the care of this patient are listed above. Additional providers and suppliers are listed below:      Recent Hospitalizations:  No hospitalization(s) within the last year..    Recent Lab Results:  CMP:  Lab Results   Component Value Date     (H) 05/03/2016    BUN 17 10/13/2020    CREATININE 1.24 10/13/2020    EGFRIFNONA 56 (L) 10/13/2020    BCR 13.7 10/13/2020     10/13/2020    K 4.4 10/13/2020    CO2 22.3 10/13/2020    CALCIUM 9.5 10/13/2020    ALBUMIN 4.60 10/13/2020    LABIL2 1.1 05/03/2016    BILITOT 0.5 10/13/2020    ALKPHOS 137 (H) 10/13/2020    AST 42 (H) 10/13/2020    ALT 78 (H) 10/13/2020       LIPID PANEL:  Lab Results   Component Value Date    CHLPL 193 03/27/2015    TRIG 105 10/05/2020    HDL 46 10/05/2020    VLDL 21 10/05/2020     (H) 10/05/2020       HbA1c:  Lab Results   Component Value Date    HGBA1C 5.9 03/27/2015       URINE MICROALBUMIN:  No results found for: MICROALBUR, POCMALB    PSA:  Lab Results   Component Value Date    PSA 0.710 10/05/2020       Age-appropriate Screening Schedule:  Refer to the list below for future screening recommendations based on patient's age, sex and/or medical conditions. Orders for these recommended tests are listed in the plan  section. The patient has been provided with a written plan.    Health Maintenance   Topic Date Due   • TDAP/TD VACCINES (1 - Tdap) 03/16/1961   • ZOSTER VACCINE (1 of 2) 03/16/1992   • LIPID PANEL  10/05/2021   • COLONOSCOPY  08/29/2029   • INFLUENZA VACCINE  Completed       Depression Screen:   PHQ-2/PHQ-9 Depression Screening 11/9/2020   Little interest or pleasure in doing things 0   Feeling down, depressed, or hopeless 0   Total Score 0         Functional and Cognitive Screening:  Functional & Cognitive Status 11/9/2020   Do you have difficulty preparing food and eating? No   Do you have difficulty bathing yourself, getting dressed or grooming yourself? No   Do you have difficulty using the toilet? No   Do you have difficulty moving around from place to place? No   Do you have trouble with steps or getting out of a bed or a chair? No   Current Diet Well Balanced Diet   Dental Exam Up to date   Eye Exam Up to date   Exercise (times per week) 7 times per week   Current Exercises Include Walking   Do you need help using the phone?  No   Are you deaf or do you have serious difficulty hearing?  No   Do you need help with transportation? No   Do you need help shopping? No   Do you need help preparing meals?  No   Do you need help with housework?  No   Do you need help with laundry? No   Do you need help taking your medications? No   Do you need help managing money? No   Do you ever drive or ride in a car without wearing a seat belt? No   Have you felt unusual stress, anger or loneliness in the last month? No   Who do you live with? Spouse   If you need help, do you have trouble finding someone available to you? No   Have you been bothered in the last four weeks by sexual problems? No   Do you have difficulty concentrating, remembering or making decisions? No       Does the patient have evidence of cognitive impairment? No    Advanced Care Planning:  ACP discussion was held with the patient during this visit. Patient  "has an advance directive in EMR which is still valid.     Identification of Risk Factors:  Risk factors include: Chronic Pain .    Review of Systems    Compared to one year ago, the patient feels his physical health is better.  Compared to one year ago, the patient feels his mental health is better.    Objective     Physical Exam    Vitals:    11/09/20 1406   BP: 123/64   Pulse: 74   Resp: 16   Temp: 98.2 °F (36.8 °C)   TempSrc: Temporal   SpO2: 98%   Weight: 96.1 kg (211 lb 12.8 oz)   Height: 177.8 cm (70\")       Body mass index is 30.39 kg/m².  Discussed the patient's BMI with him. The BMI is above average; BMI management plan is completed.    Assessment/Plan   Patient Self-Management and Personalized Health Advice  The patient has been provided with information about: diet, exercise, weight management and fall prevention and preventive services including:   · Annual Wellness Visit (AWV).    Visit Diagnoses:    ICD-10-CM ICD-9-CM   1. NAFLD (nonalcoholic fatty liver disease)  K76.0 571.8   2. Routine general medical examination at a health care facility  Z00.00 V70.0       Orders Placed This Encounter   Procedures   • Comprehensive Metabolic Panel       Outpatient Encounter Medications as of 11/9/2020   Medication Sig Dispense Refill   • acetaminophen (Tylenol 8 Hour) 650 MG 8 hr tablet Every 6 (Six) Hours.     • albuterol sulfate HFA (Ventolin HFA) 108 (90 Base) MCG/ACT inhaler Inhale 1 puff Every 6 (Six) Hours As Needed for Wheezing. 1 g 3   • cetirizine (ZYRTEC ALLERGY) 10 MG tablet Take 1 tablet by mouth every morning. As needed     • cyanocobalamin 1000 MCG/ML injection Inject 1,000 mcg into the appropriate muscle as directed by prescriber Every 28 (Twenty-Eight) Days.     • ipratropium (ATROVENT) 0.06 % nasal spray 2 sprays into the nostril(s) as directed by provider every night at bedtime. 15 mL 12   • levothyroxine (SYNTHROID, LEVOTHROID) 100 MCG tablet Take 1 tablet by mouth Daily. 90 tablet 3   • Liver " "Extract (LIVER PO) Take 1 capsule by mouth 2 (two) times a day.     • NIFEdipine XL (PROCARDIA XL) 60 MG 24 hr tablet Take 1 tablet by mouth Daily. 90 tablet 3   • Probiotic Product (PROBIOTIC-10 PO) Probiotic   1 capsule daily     • Syringe/Needle, Disp, (B-D 3CC LUER-CAITLIN SYR 25GX1\") 25G X 1\" 3 ML misc      • tamsulosin (FLOMAX) 0.4 MG capsule 24 hr capsule Take 1 capsule by mouth Daily. 90 capsule 3   • [DISCONTINUED] doxycycline (MONODOX) 100 MG capsule Take 1 capsule by mouth Every 12 (Twelve) Hours. 28 capsule 0   • [DISCONTINUED] guaiFENesin (MUCINEX) 600 MG 12 hr tablet Take 2 tablets by mouth 2 (two) times a day.       No facility-administered encounter medications on file as of 11/9/2020.        Reviewed use of high risk medication in the elderly: yes  Reviewed for potential of harmful drug interactions in the elderly: yes    Follow Up:  Return in about 6 months (around 5/9/2021).     An After Visit Summary and PPPS with all of these plans were given to the patient.        If liver worse start w/u.         Diagnoses and all orders for this visit:    1. NAFLD (nonalcoholic fatty liver disease) (Primary)  -     Comprehensive Metabolic Panel    2. Routine general medical examination at a health care facility      "

## 2020-11-09 NOTE — PROGRESS NOTES
Subjective     Patient ID: Cipriano Montes is a 80 y.o. male. Patient is here for management of multiple medical problems.     Chief Complaint   Patient presents with   • Hypothyroidism     4 week follow-up   • Fatigue     4 week follow-up     History of Present Illness       Cap resolved with abx.    Fatty liver. 10 lb. Wt loss.            The following portions of the patient's history were reviewed and updated as appropriate: allergies, current medications, past family history, past medical history, past social history, past surgical history and problem list.    Review of Systems   Constitutional: Negative for chills, diaphoresis and fatigue.   Respiratory: Negative for cough and shortness of breath.    Psychiatric/Behavioral: Negative for self-injury and sleep disturbance. The patient is not nervous/anxious and is not hyperactive.    All other systems reviewed and are negative.      Current Outpatient Medications:   •  acetaminophen (Tylenol 8 Hour) 650 MG 8 hr tablet, Every 6 (Six) Hours., Disp: , Rfl:   •  albuterol sulfate HFA (Ventolin HFA) 108 (90 Base) MCG/ACT inhaler, Inhale 1 puff Every 6 (Six) Hours As Needed for Wheezing., Disp: 1 g, Rfl: 3  •  cetirizine (ZYRTEC ALLERGY) 10 MG tablet, Take 1 tablet by mouth every morning. As needed, Disp: , Rfl:   •  cyanocobalamin 1000 MCG/ML injection, Inject 1,000 mcg into the appropriate muscle as directed by prescriber Every 28 (Twenty-Eight) Days., Disp: , Rfl:   •  ipratropium (ATROVENT) 0.06 % nasal spray, 2 sprays into the nostril(s) as directed by provider every night at bedtime., Disp: 15 mL, Rfl: 12  •  levothyroxine (SYNTHROID, LEVOTHROID) 100 MCG tablet, Take 1 tablet by mouth Daily., Disp: 90 tablet, Rfl: 3  •  Liver Extract (LIVER PO), Take 1 capsule by mouth 2 (two) times a day., Disp: , Rfl:   •  NIFEdipine XL (PROCARDIA XL) 60 MG 24 hr tablet, Take 1 tablet by mouth Daily., Disp: 90 tablet, Rfl: 3  •  Probiotic Product (PROBIOTIC-10 PO),  "Probiotic  1 capsule daily, Disp: , Rfl:   •  Syringe/Needle, Disp, (B-D 3CC LUER-CAITLIN SYR 25GX1\") 25G X 1\" 3 ML misc, , Disp: , Rfl:   •  tamsulosin (FLOMAX) 0.4 MG capsule 24 hr capsule, Take 1 capsule by mouth Daily., Disp: 90 capsule, Rfl: 3    Objective      Blood pressure 123/64, pulse 74, temperature 98.2 °F (36.8 °C), temperature source Temporal, resp. rate 16, height 177.8 cm (70\"), weight 96.1 kg (211 lb 12.8 oz), SpO2 98 %.    Physical Exam     General Appearance:    Alert, cooperative, no distress, appears stated age   Head:    Normocephalic, without obvious abnormality, atraumatic   Eyes:    PERRL, conjunctiva/corneas clear, EOM's intact   Ears:    Normal TM's and external ear canals, both ears   Nose:   Nares normal, septum midline, mucosa normal, no drainage   or sinus tenderness   Throat:   Lips, mucosa, and tongue normal; teeth and gums normal   Neck:   Supple, symmetrical, trachea midline, no adenopathy;        thyroid:  No enlargement/tenderness/nodules; no carotid    bruit or JVD   Back:     Symmetric, no curvature, ROM normal, no CVA tenderness   Lungs:     Clear to auscultation bilaterally, respirations unlabored   Chest wall:    No tenderness or deformity   Heart:    Regular rate and rhythm, S1 and S2 normal, no murmur,        rub or gallop   Abdomen:     Soft, non-tender, bowel sounds active all four quadrants,     no masses, no organomegaly   Extremities:   ttp right ankle.  wearing brace.  Extremities normal, atraumatic, no cyanosis or edema   Pulses:   2+ and symmetric all extremities   Skin:   Skin color, texture, turgor normal, no rashes or lesions   Lymph nodes:   Cervical, supraclavicular, and axillary nodes normal   Neurologic:   CNII-XII intact. Normal strength, sensation and reflexes       throughout      Results for orders placed or performed during the hospital encounter of 10/13/20   Comprehensive Metabolic Panel    Specimen: Blood   Result Value Ref Range    Glucose 134 (H) 65 - " 99 mg/dL    BUN 17 8 - 23 mg/dL    Creatinine 1.24 0.76 - 1.27 mg/dL    Sodium 141 136 - 145 mmol/L    Potassium 4.4 3.5 - 5.2 mmol/L    Chloride 107 98 - 107 mmol/L    CO2 22.3 22.0 - 29.0 mmol/L    Calcium 9.5 8.6 - 10.5 mg/dL    Total Protein 7.7 6.0 - 8.5 g/dL    Albumin 4.60 3.50 - 5.20 g/dL    ALT (SGPT) 78 (H) 1 - 41 U/L    AST (SGOT) 42 (H) 1 - 40 U/L    Alkaline Phosphatase 137 (H) 39 - 117 U/L    Total Bilirubin 0.5 0.0 - 1.2 mg/dL    eGFR Non African Amer 56 (L) >60 mL/min/1.73    Globulin 3.1 gm/dL    A/G Ratio 1.5 g/dL    BUN/Creatinine Ratio 13.7 7.0 - 25.0    Anion Gap 11.7 5.0 - 15.0 mmol/L   BNP    Specimen: Blood   Result Value Ref Range    proBNP 22.4 0.0-1,800.0 pg/mL   Troponin    Specimen: Blood   Result Value Ref Range    Troponin T <0.010 0.000 - 0.030 ng/mL   CBC Auto Differential    Specimen: Blood   Result Value Ref Range    WBC 8.65 3.40 - 10.80 10*3/mm3    RBC 5.72 4.14 - 5.80 10*6/mm3    Hemoglobin 17.1 13.0 - 17.7 g/dL    Hematocrit 50.7 37.5 - 51.0 %    MCV 88.6 79.0 - 97.0 fL    MCH 29.9 26.6 - 33.0 pg    MCHC 33.7 31.5 - 35.7 g/dL    RDW 13.6 12.3 - 15.4 %    RDW-SD 44.1 37.0 - 54.0 fl    MPV 8.8 6.0 - 12.0 fL    Platelets 229 140 - 450 10*3/mm3    Neutrophil % 67.9 42.7 - 76.0 %    Lymphocyte % 20.7 19.6 - 45.3 %    Monocyte % 8.6 5.0 - 12.0 %    Eosinophil % 2.1 0.3 - 6.2 %    Basophil % 0.5 0.0 - 1.5 %    Immature Grans % 0.2 0.0 - 0.5 %    Neutrophils, Absolute 5.88 1.70 - 7.00 10*3/mm3    Lymphocytes, Absolute 1.79 0.70 - 3.10 10*3/mm3    Monocytes, Absolute 0.74 0.10 - 0.90 10*3/mm3    Eosinophils, Absolute 0.18 0.00 - 0.40 10*3/mm3    Basophils, Absolute 0.04 0.00 - 0.20 10*3/mm3    Immature Grans, Absolute 0.02 0.00 - 0.05 10*3/mm3    nRBC 0.0 0.0 - 0.2 /100 WBC   Blood Gas, Arterial With Co-Ox    Specimen: Arterial Blood   Result Value Ref Range    Site Left Radial     Bright's Test Positive     pH, Arterial 7.349 7.300 - 7.500 pH units    pCO2, Arterial 38.0 35.0 - 45.0 mm  Hg    pO2, Arterial 84.0 75.0 - 100.0 mm Hg    HCO3, Arterial 20.9 (L) 22.0 - 28.0 mmol/L    Base Excess, Arterial -4.2 (L) 0.0 - 2.0 mmol/L    O2 Saturation, Arterial 95.4 94.0 - 100.0 %    Hematocrit, Blood Gas 51.9 %    Oxyhemoglobin 94.4 94 - 99 %    Methemoglobin 0.90 0.00 - 1.50 %    Carboxyhemoglobin <0.6 0 - 2 %    A-a Gradiant 67.1 mmHg    Barometric Pressure for Blood Gas 738 mmHg    Modality Nasal Cannula     FIO2 28 %    Flow Rate 2.0 lpm    Ventilator Mode NA     Note      pH, Temp Corrected      pCO2, Temperature Corrected      pO2, Temperature Corrected     Light Blue Top   Result Value Ref Range    Extra Tube hold for add-on    Green Top (Gel)   Result Value Ref Range    Extra Tube Hold for add-ons.    Lavender Top   Result Value Ref Range    Extra Tube hold for add-on    Gold Top - SST   Result Value Ref Range    Extra Tube Hold for add-ons.          Assessment/Plan   Lung issues resolved.    RMSF ab +. Arthritis improved. With abx.    Will follow.    Right ankle pain. Pt wants options.  Left leg weakness. Unclear etiology.   Will follow.       Diagnoses and all orders for this visit:    1. NAFLD (nonalcoholic fatty liver disease) (Primary)  -     Comprehensive Metabolic Panel    2. Routine general medical examination at a health care facility    3. Chronic pain of right ankle  -     Ambulatory Referral to Orthopedic Surgery      Return in about 6 months (around 5/9/2021).          There are no Patient Instructions on file for this visit.     Ron Velazquez MD    Assessment/Plan

## 2020-11-10 LAB
ALBUMIN SERPL-MCNC: 3.9 G/DL (ref 3.5–5.2)
ALBUMIN/GLOB SERPL: 1.6 G/DL
ALP SERPL-CCNC: 114 U/L (ref 39–117)
ALT SERPL-CCNC: 48 U/L (ref 1–41)
AST SERPL-CCNC: 26 U/L (ref 1–40)
BILIRUB SERPL-MCNC: 0.4 MG/DL (ref 0–1.2)
BUN SERPL-MCNC: 17 MG/DL (ref 8–23)
BUN/CREAT SERPL: 14.7 (ref 7–25)
CALCIUM SERPL-MCNC: 9.2 MG/DL (ref 8.6–10.5)
CHLORIDE SERPL-SCNC: 109 MMOL/L (ref 98–107)
CO2 SERPL-SCNC: 24.8 MMOL/L (ref 22–29)
CREAT SERPL-MCNC: 1.16 MG/DL (ref 0.76–1.27)
GLOBULIN SER CALC-MCNC: 2.4 GM/DL
GLUCOSE SERPL-MCNC: 88 MG/DL (ref 65–99)
POTASSIUM SERPL-SCNC: 4.4 MMOL/L (ref 3.5–5.2)
PROT SERPL-MCNC: 6.3 G/DL (ref 6–8.5)
SODIUM SERPL-SCNC: 140 MMOL/L (ref 136–145)

## 2020-11-10 NOTE — PROGRESS NOTES
Please let them know the labs look a lot better. LFT's almost back to normal. Other labs look great.

## 2020-12-17 ENCOUNTER — LAB (OUTPATIENT)
Dept: LAB | Facility: HOSPITAL | Age: 80
End: 2020-12-17

## 2020-12-17 PROCEDURE — 36415 COLL VENOUS BLD VENIPUNCTURE: CPT | Performed by: INTERNAL MEDICINE

## 2020-12-17 PROCEDURE — 84207 ASSAY OF VITAMIN B-6: CPT | Performed by: INTERNAL MEDICINE

## 2020-12-18 LAB — SPECIMEN STATUS: NORMAL

## 2020-12-21 ENCOUNTER — OFFICE VISIT (OUTPATIENT)
Dept: ORTHOPEDIC SURGERY | Facility: CLINIC | Age: 80
End: 2020-12-21

## 2020-12-21 VITALS — HEIGHT: 70 IN | BODY MASS INDEX: 30.39 KG/M2 | HEART RATE: 93 BPM | OXYGEN SATURATION: 98 %

## 2020-12-21 DIAGNOSIS — I87.8 VENOUS STASIS: ICD-10-CM

## 2020-12-21 DIAGNOSIS — M79.671 RIGHT FOOT PAIN: Primary | ICD-10-CM

## 2020-12-21 DIAGNOSIS — G60.9 HEREDITARY AND IDIOPATHIC NEUROPATHY, UNSPECIFIED: ICD-10-CM

## 2020-12-21 DIAGNOSIS — M12.571 TRAUMATIC ARTHRITIS OF RIGHT ANKLE: ICD-10-CM

## 2020-12-21 DIAGNOSIS — G62.9 NEUROPATHY: ICD-10-CM

## 2020-12-21 DIAGNOSIS — R29.898 WEAKNESS OF BOTH LEGS: ICD-10-CM

## 2020-12-21 PROCEDURE — 99204 OFFICE O/P NEW MOD 45 MIN: CPT | Performed by: ORTHOPAEDIC SURGERY

## 2020-12-21 NOTE — PROGRESS NOTES
NEW PATIENT    Patient: Cipriano Montes  : 1940    Primary Care Provider: Ron Velazquez MD    Requesting Provider: As above    Pain of the Right Ankle      History    Chief Complaint: Foot and ankle problems, weakness    History of Present Illness: This is a very pleasant 80-year-old gentleman with a complicated history.  I have reviewed his large stack of outside records.  He has a history of neuropathy, diagnosed 4 to 5 years ago in [place].  He does not remember having any nerve studies, he is not exactly certain how it was diagnosed.  He has a history of pernicious anemia and has been on B12 injections since at least .  He is not certain if this is the source of neuropathy.  He does not think that he has any evidence of diabetes.  In 2018, he had a significant fall and sustained an open grade 3 right pilon fracture on 2018.  He was treated in [place], initially washed out and placed in an external fixator at a smaller hospital, and then transferred to a trauma center for further care.  He had a complicated and gaby course involving multiple surgeries.  He had internal fixation, fixation removed, external fixation, he was treated with IV antibiotics for infection.  Ultimately the wound is healed.  He has not seen an orthopedist since 2019.  He would like to establish care for follow-up.  He has been told that he might need further surgery on the .  He has now moved to [place].  He remembers being told that he might need an ankle fusion in the future.  He has not had any drainage in the past 18 months, and reports no pain.  He does report persistent swelling and stiffness.  He plays golf at least once a week and he is able to do this even with the stiff right ankle.  He has noticed significant numbness in the foot, he has not noticed it as much on the left.  He reports increasing difficulty with balance.  He has not done physical therapy in over a year.   "He has noted increasing weakness in the left leg.  He is not exactly certain when the weakness started.  He does not have any specific history of back problems, no history of sciatica, no history of lumbar stenosis that he is aware of.  He has not noticed any upper extremity weakness.    Current Outpatient Medications on File Prior to Visit   Medication Sig Dispense Refill   • acetaminophen (Tylenol 8 Hour) 650 MG 8 hr tablet Every 6 (Six) Hours.     • albuterol sulfate HFA (Ventolin HFA) 108 (90 Base) MCG/ACT inhaler Inhale 1 puff Every 6 (Six) Hours As Needed for Wheezing. 1 g 3   • cetirizine (ZYRTEC ALLERGY) 10 MG tablet Take 1 tablet by mouth every morning. As needed     • cyanocobalamin 1000 MCG/ML injection Inject 1,000 mcg into the appropriate muscle as directed by prescriber Every 28 (Twenty-Eight) Days.     • ipratropium (ATROVENT) 0.06 % nasal spray 2 sprays into the nostril(s) as directed by provider every night at bedtime. 15 mL 12   • levothyroxine (SYNTHROID, LEVOTHROID) 100 MCG tablet Take 1 tablet by mouth Daily. 90 tablet 3   • Liver Extract (LIVER PO) Take 1 capsule by mouth 2 (two) times a day.     • NIFEdipine XL (PROCARDIA XL) 60 MG 24 hr tablet Take 1 tablet by mouth Daily. 90 tablet 3   • Probiotic Product (PROBIOTIC-10 PO) Probiotic   1 capsule daily     • Syringe/Needle, Disp, (B-D 3CC LUER-CAITLIN SYR 25GX1\") 25G X 1\" 3 ML misc      • tamsulosin (FLOMAX) 0.4 MG capsule 24 hr capsule Take 1 capsule by mouth Daily. 90 capsule 3     No current facility-administered medications on file prior to visit.       Allergies   Allergen Reactions   • Oxycodone-Acetaminophen GI Intolerance      Past Medical History:   Diagnosis Date   • Hypertension    • Hypothyroidism    • Thyroid disorder      Past Surgical History:   Procedure Laterality Date   • COLON SURGERY     • TONSILLECTOMY     • TONSILLECTOMY       Family History   Problem Relation Age of Onset   • Cancer Other    • Diabetes Other    • Prostate " "cancer Other    • Leukemia Mother    • Diabetes Father       Social History     Socioeconomic History   • Marital status:      Spouse name: Not on file   • Number of children: Not on file   • Years of education: Not on file   • Highest education level: Not on file   Tobacco Use   • Smoking status: Never Smoker   • Smokeless tobacco: Never Used   Substance and Sexual Activity   • Alcohol use: Yes     Comment: unknown   • Drug use: No        Review of Systems   Constitutional: Positive for fatigue.   HENT: Negative.    Eyes: Positive for itching.   Respiratory: Positive for wheezing.    Cardiovascular: Positive for leg swelling.   Gastrointestinal: Negative.    Endocrine: Negative.    Genitourinary: Positive for difficulty urinating.   Musculoskeletal: Positive for arthralgias.   Skin: Negative.    Allergic/Immunologic: Positive for environmental allergies.   Neurological: Positive for weakness and numbness.   Hematological: Negative.    Psychiatric/Behavioral: Negative.        The following portions of the patient's history were reviewed and updated as appropriate: allergies, current medications, past family history, past medical history, past social history, past surgical history and problem list.    Physical Exam:   Pulse 93   Ht 177.8 cm (70\")   SpO2 98%   BMI 30.39 kg/m²   GENERAL: Body habitus: overweight    Lower extremity edema: Right: 3+ pitting; Left: 2+ pitting    Varicose veins:  Right: mild; Left: mild    Gait: Gait has relatively smooth gregory, some decrease in pushoff bilaterally     Mental Status:  awake and alert; oriented to person, place, and time    Voice:  clear  SKIN:  Lower extremity: Mild venous stasis pigment right worse than left    Hair Growth(lower extremity):  Right:normal; Left:  normal  NAILS: Toenails: normal  HEENT: Head: Normocephalic, atraumatic,  without obvious abnormality.  eye: normal external eye, no icterus  ears:normal external ears  PULM:  Repiratory effort " normal  CV:  Dorsalis Pedis:  Right: 2+; Left:2+    Posterior Tibial: Right:2+; Left:2+    Capillary Refill:  Brisk  MSK:  Hand:right handed and No weakness that I can identify      Tibia:  Right:  non tender; Left:  non tender      Ankle:  Right: Multiple healed incisions around the right ankle, it is quite stiff, a few degrees of dorsiflexion, 10 to 20 degrees of plantarflexion, no inversion eversion, moderate diffuse edema from the toes to the knee.  No tenderness to palpation.  All incisions and wounds are closed, no erythema, nothing that looks like infection; Left:  No tenderness in the left ankle range of motion is normal      Foot:  Right:  Moderate clawing of toes 1 through 5 with stiffness, decreased function of the EHL secondary to scarring on the anteromedial ankle; Left:  non tender      NEURO: Heel Walking:  Right:  Difficult to evaluate with the stiffness in the ankle but he is able to dorsiflex the foot off the floor; Left:  normal    Toe Walking:  Right:  Quite weak, he is unable to keep the heel off the floor and when he tries his knee flexes; Left:  Quite weak, he is unable to keep the heel off the floor     [place] 5.07 monofilament test: almost completely insensate on the plantar surface of both feet to the semmes-elizabeth fiber    Lower extremity sensation: diminished     Reflexes:  Biceps:  Right:  not tested; Left:  not tested           Quads:  Right:  not tested; Left:  not tested           Ankle:  Right:  not tested; Left:  not tested      Calf Atrophy:atrophy is difficult to evaluate due to edema    Motor Function: dorsiflexors are 5/5 bilat but quads/FHL are 4/5 bilat         Medical Decision Making    Data Review:   ordered and reviewed x-rays today, reviewed radiology results and reviewed outside records    Assessment and Plan/ Diagnosis/Treatment options:   1. Traumatic arthritis of right ankle  He has several different problems.  He definitely has quite severe posttraumatic  arthritis in the right ankle on the x-ray, but because of his dense neuropathy he does not have pain.  We talked about ankle fusion, but I explained that pain would be the indication.  Since his neuropathy is so dense it may be that he never needs ankle fusion.  I do not think anyone would consider ankle replacement.  I would recommend simply following the ankle over time, x-raying it perhaps once a year.  - EMG & Nerve Conduction Test; Future  - Ambulatory Referral to Physical Therapy    2. Neuropathy  I think the more pressing problem is his very dense neuropathy and the weakness of the plantar flexors bilaterally.  It is a little difficult to fully evaluate the right side due to the stiffness, but on the left side he definitely has weak plantar flexion.  He is unable to get up on his toes on either side and keep the heel off the floor.  When he tries both knees flex consistent with weakness in the gastrocsoleus complex.  It may be that the weakness is secondary to his sensory neuropathy, and may be a separate problem.  He may have both neuropathy and something like spinal stenosis.  I think both of these played into what he describes as a balance problem.  I would recommend we do EMG and nerve conduction studies.  Pending those results he might need referral to either neurology or neurosurgery.  I will see him back following the tests.  In the interim I think physical therapy to work on balance would be advisable and we will give him a prescription.  - EMG & Nerve Conduction Test; Future  - Ambulatory Referral to Physical Therapy    3. Venous stasis  He has underlying venous stasis and then severe trauma on top of that, he needs to wear compression socks on both legs.  He needs to put them on in the morning and take them off at night.  I explained edema and venous stasis to the patient, and the often hereditary nature of the problem, and the contribution of weight, trauma, etc. I explained how they cause edema  (due to gravity, etc) I explained how they can lead to ulceration.  I explained how they can cause pain, stiffness, aching, cramping, etc. I explained that it is a very very common problem, so common that even [employer] markets compression stockings.  I recommend wearing support stockings (compression stockings) daily, we discussed what type and where to find them        - EMG & Nerve Conduction Test; Future  - Ambulatory Referral to Physical Therapy        5. Weakness of both legs  As above we need to do nerve studies    6. Hereditary and idiopathic neuropathy, unspecified   As above we need to do nerve studies  - EMG & Nerve Conduction Test; Future            Radiology Ordered []  Radiology Reports Reviewed []      Radiology Images Reviewed []   Labs Reviewed []    Labs Ordered []   PCP Records Reviewed []    Provider Records Reviewed []    ER Records Reviewed []    Hospital Records Reviewed []    History Obtained From Family []    Phone conversation with Provider []    Records Requested []        Madison García MD

## 2020-12-23 ENCOUNTER — TELEPHONE (OUTPATIENT)
Dept: INTERNAL MEDICINE | Facility: CLINIC | Age: 80
End: 2020-12-23

## 2020-12-23 LAB — VIT B6 SERPL-MCNC: 6.7 UG/L (ref 5.3–46.7)

## 2020-12-23 NOTE — TELEPHONE ENCOUNTER
PATIENT HAVING CONGESTION, COUGH, DRAINAGE.  TAKING CLARITIN AND MCGUIRE'S.  COUGH BAD AT NIGHT.  PATIENT RECEIVED ANTIBIOTIC LAST TIME HE HAD THIS.  NO FEVER.      PHARMACY:  [facility]    PLEASE CALL [phone]

## 2020-12-24 ENCOUNTER — OFFICE VISIT (OUTPATIENT)
Dept: INTERNAL MEDICINE | Facility: CLINIC | Age: 80
End: 2020-12-24

## 2020-12-24 ENCOUNTER — LAB (OUTPATIENT)
Dept: LAB | Facility: HOSPITAL | Age: 80
End: 2020-12-24

## 2020-12-24 DIAGNOSIS — J45.901 EXACERBATION OF ASTHMA, UNSPECIFIED ASTHMA SEVERITY, UNSPECIFIED WHETHER PERSISTENT: ICD-10-CM

## 2020-12-24 DIAGNOSIS — J98.9 RESPIRATORY ILLNESS: Primary | ICD-10-CM

## 2020-12-24 LAB — SARS-COV-2 RNA RESP QL NAA+PROBE: NOT DETECTED

## 2020-12-24 PROCEDURE — U0004 COV-19 TEST NON-CDC HGH THRU: HCPCS | Performed by: FAMILY MEDICINE

## 2020-12-24 PROCEDURE — C9803 HOPD COVID-19 SPEC COLLECT: HCPCS

## 2020-12-24 PROCEDURE — 99441 PR PHYS/QHP TELEPHONE EVALUATION 5-10 MIN: CPT | Performed by: FAMILY MEDICINE

## 2020-12-24 RX ORDER — DOXYCYCLINE HYCLATE 100 MG/1
100 TABLET, DELAYED RELEASE ORAL 2 TIMES DAILY
Qty: 20 TABLET | Refills: 0 | Status: SHIPPED | OUTPATIENT
Start: 2020-12-24 | End: 2021-01-08

## 2020-12-24 RX ORDER — BENZONATATE 200 MG/1
200 CAPSULE ORAL 3 TIMES DAILY PRN
Qty: 30 CAPSULE | Refills: 0 | Status: SHIPPED | OUTPATIENT
Start: 2020-12-24 | End: 2021-01-08

## 2020-12-24 NOTE — PROGRESS NOTES
Cipriano Montes is a 80 y.o. male.    Chief Complaint   Patient presents with   • Cough     Patient states cough has been going on for about a week.   • Nasal Congestion     Patient states cough has been going on for about a week.     During today's visit, I reviewed the documented allergies, medications, chief complaint, and pertinent vitals.  I have confirmed with the patient that there have been no changes since this information was discussed with my clinical team member.      HPI   Patient reports they have not been feeling well for 1-2 weeks.  He reports this is not new for him due to asthma.   He admits to tightness in chest and severe drainage.  He reports cough with drainage.  He denies fever and does not feel bad. Increased shortness of breath.   He denies sore throat, fever.  He reports he has been using his albuterol inhaler multiple times a day here lately.  Did well with doxycycline 2 months ago for a similar illness.       The following portions of the patient's history were reviewed and updated as appropriate: allergies, current medications, past family history, past medical history, past social history, past surgical history and problem list.     Allergies   Allergen Reactions   • Oxycodone-Acetaminophen GI Intolerance         Current Outpatient Medications:   •  acetaminophen (Tylenol 8 Hour) 650 MG 8 hr tablet, Every 6 (Six) Hours., Disp: , Rfl:   •  albuterol sulfate HFA (Ventolin HFA) 108 (90 Base) MCG/ACT inhaler, Inhale 1 puff Every 6 (Six) Hours As Needed for Wheezing., Disp: 1 g, Rfl: 3  •  cyanocobalamin 1000 MCG/ML injection, Inject 1,000 mcg into the appropriate muscle as directed by prescriber Every 28 (Twenty-Eight) Days., Disp: , Rfl:   •  ipratropium (ATROVENT) 0.06 % nasal spray, 2 sprays into the nostril(s) as directed by provider every night at bedtime., Disp: 15 mL, Rfl: 12  •  levothyroxine (SYNTHROID, LEVOTHROID) 100 MCG tablet, Take 1 tablet by mouth Daily., Disp: 90 tablet,  "Rfl: 3  •  NIFEdipine XL (PROCARDIA XL) 60 MG 24 hr tablet, Take 1 tablet by mouth Daily., Disp: 90 tablet, Rfl: 3  •  Probiotic Product (PROBIOTIC-10 PO), Probiotic  1 capsule daily, Disp: , Rfl:   •  Syringe/Needle, Disp, (B-D 3CC LUER-CAITLIN SYR 25GX1\") 25G X 1\" 3 ML misc, , Disp: , Rfl:   •  tamsulosin (FLOMAX) 0.4 MG capsule 24 hr capsule, Take 1 capsule by mouth Daily., Disp: 90 capsule, Rfl: 3  •  benzonatate (TESSALON) 200 MG capsule, Take 1 capsule by mouth 3 (Three) Times a Day As Needed for Cough., Disp: 30 capsule, Rfl: 0  •  doxycycline (DORYX) 100 MG enteric coated tablet, Take 1 tablet by mouth 2 (Two) Times a Day., Disp: 20 tablet, Rfl: 0    ROS    Review of Systems   Constitutional: Negative for chills and fever.   HENT: Positive for congestion and postnasal drip. Negative for sinus pressure and sore throat.    Respiratory: Positive for cough, chest tightness, shortness of breath and wheezing.    Cardiovascular: Negative for chest pain.   Gastrointestinal: Negative for abdominal pain, constipation, diarrhea, nausea and vomiting.   Neurological: Negative for headache.       There were no vitals filed for this visit.  There is no height or weight on file to calculate BMI.    Physical Exam     Physical Exam    Assessment/Plan    Problems Addressed this Visit     None      Visit Diagnoses     Respiratory illness    -  Primary    Relevant Orders    COVID-19 PCR, [facility], NP SWAB IN [facility] TRANSPORT MEDIA 24-30 HR TAT - Swab, Nasopharynx    Exacerbation of asthma, unspecified asthma severity, unspecified whether persistent        Relevant Orders    COVID-19 PCR, [facility], NP SWAB IN [facility] TRANSPORT MEDIA 24-30 HR TAT - Swab, Nasopharynx      Diagnoses       Codes Comments    Respiratory illness    -  Primary ICD-10-CM: J98.9  ICD-9-CM: 519.9     Exacerbation of asthma, unspecified asthma severity, unspecified whether persistent     ICD-10-CM: J45.901  ICD-9-CM: 493.92         Will go ahead " and treat patient for upper respiratory infection and asthma exacerbation with doxycycline.  He will continue to use his albuterol inhaler.  Encouraged to use a neti pot for congestion.  Discussed with patient with increased shortness of breath and cough he should be COVID tested as well. Patient agreeable.     This visit has been rescheduled as a phone visit to comply with patient safety concerns in accordance with CDC recommendations. Total time of discussion was 8 minutes.      New Medications Ordered This Visit   Medications   • benzonatate (TESSALON) 200 MG capsule     Sig: Take 1 capsule by mouth 3 (Three) Times a Day As Needed for Cough.     Dispense:  30 capsule     Refill:  0   • doxycycline (DORYX) 100 MG enteric coated tablet     Sig: Take 1 tablet by mouth 2 (Two) Times a Day.     Dispense:  20 tablet     Refill:  0       No orders of the defined types were placed in this encounter.      No follow-ups on file.    Yasmin Angel DO

## 2020-12-24 NOTE — PROGRESS NOTES
You have chosen to receive care through a telephone visit. Do you consent to use a telephone visit for your medical care today? Yes  People present in this telephone visit:  Cipriano Montes (Patient)  BRINDA Weston, DO

## 2021-01-04 ENCOUNTER — PROCEDURE VISIT (OUTPATIENT)
Dept: NEUROLOGY | Facility: CLINIC | Age: 81
End: 2021-01-04

## 2021-01-04 VITALS — WEIGHT: 217 LBS | BODY MASS INDEX: 31.07 KG/M2 | TEMPERATURE: 97.3 F | HEIGHT: 70 IN

## 2021-01-04 DIAGNOSIS — I87.8 VENOUS STASIS: ICD-10-CM

## 2021-01-04 DIAGNOSIS — G60.9 HEREDITARY AND IDIOPATHIC NEUROPATHY, UNSPECIFIED: Primary | ICD-10-CM

## 2021-01-04 DIAGNOSIS — G62.9 NEUROPATHY: ICD-10-CM

## 2021-01-04 DIAGNOSIS — M12.571 TRAUMATIC ARTHRITIS OF RIGHT ANKLE: ICD-10-CM

## 2021-01-04 PROCEDURE — 95910 NRV CNDJ TEST 7-8 STUDIES: CPT | Performed by: PSYCHIATRY & NEUROLOGY

## 2021-01-04 PROCEDURE — 95886 MUSC TEST DONE W/N TEST COMP: CPT | Performed by: PSYCHIATRY & NEUROLOGY

## 2021-01-04 NOTE — PROGRESS NOTES
[facility]   Electrodiagnostic Laboratory    Nerve Conduction & EMG Report        Patient:  Cipriano Montes   Patient ID: [id number]   YOB: 1940  Sex:  male      Exam Physician: Iain Heaton MD  Refer Physician: Madison García MD    Electromyogram and Nerve Conduction Velocity Procedure Note    Hx: 80 y.o. right handed male with complaint of numbness involving the both lower extremities and pain involving the both lower extremities. Symptoms have been present for several years and were provoked by no clear event. Significant past medical history includes peripheral neuropathy and B12 defeciency.   Family history no family history of nerve or muscle disease.    Exam: Motor power is 3/5 distal LE. There is distal atrophy. There are no fasciculations. Deep tendon reflexes are absent. Sensory exam is decreased pp and lt in the feet.     Edx studies of the B LE were performed to evaluate for peripheral  Neuropathy.     NCS Examination   For sensory nerve conduction studies, the amplitude is measured peak-to-peak, the latency reported is the distal peak latency, and the conduction velocity, if measured, is determined from onset latencies and is over the forearm.   For motor nerve conduction studies, the amplitude is measured baseline-to-peak, the latency reported is the distal onset latency, the conduction velocity is calculated over the forearm, and the F wave latency is the minimum latency.   Unless otherwise noted, the hand temperature was monitored continuously and remained between 32°C and 36°C during the performance of the NCSs.          Sensory NCS      Nerve / Sites Rec. Site Onset Lat Peak Lat NP Amp PP Amp Segments Distance Velocity     ms ms µV µV  cm m/s   L SURAL - Lat Mall Antidr      Calf Lat Mall NR NR NR NR Calf - Lat Mall 14 NR      Ref.   4.20 5.0  Ref.     R SURAL - Lat Mall Antidr      Calf Lat Mall NR NR NR NR Calf - Lat Mall 14 NR      Ref.   4.20 5.0  Ref.              Motor NCS      Nerve / Sites Rec. Site Lat Amp Seq Amp Segments Dist Velocity     ms mV %  cm m/s   L COMM PERONEAL - EDB      Ankle EDB 6.10 0.8 100 Ankle - EDB 8       Ref.  6.00 2.0  Ref.        Fib Head EDB 15.00 0.8 95.8 Fib Head - Ankle 35 39.3      Ref.     Ref.  39.0      Knee EDB 16.90 0.7 95.8 Knee - Fib Head 10 52.6   R COMM PERONEAL - EDB      Ankle EDB NR NR NR Ankle - EDB 8       Ref.  6.00 2.0  Ref.     L TIBIAL (KNEE) - AH      Ankle AH 6.60 0.4 100 Ankle - AH 8       Ref.  6.00 2.5  Ref.        Knee AH 18.45 0.2 40.4 Knee - Ankle 40 33.8      Ref.     Ref.  39.0   R TIBIAL (KNEE) - AH      Ankle AH 6.70 0.1 100 Ankle - AH 8       Ref.  6.00 2.5  Ref.        Knee AH 18.10 0.2 290 Knee - Ankle 42 36.8      Ref.     Ref.  39.0   R COMM PERONEAL - Tib Ant      Fib Head Tib Ant 3.55 2.4 100 Fib Head - Tib Ant        Knee Tib Ant 4.35 2.6 110 Knee - Fib Head 6 75.0                 F  Wave      Nerve Fmin    ms   L COMM PERONEAL 60.55   REF 56.00   L TIBIAL (KNEE) 64.00   REF 56.00   R TIBIAL (KNEE) 74.00   REF 56.00             H Reflex      Nerve H Lat    ms   L TIBIAL (KNEE) - [place] (S1) 48.00   Ref 36.00   R TIBIAL (KNEE) - [place] (S1) 38.60   Ref 36.00               EMG Examination   The study was performed with a concentric needle electrode. Fibrillation and fasciculation activity is graded from none (0) to continuous (4+). The configuration and recruitment pattern of motor unit action potentials under voluntary control, if not normal, are described bel      EMG Summary Table     Spontaneous MUAP Recruitment    IA Fib PSW Fasc H.F. Amp Dur. PPP Pattern   L. ILIOPSOAS N None None None None 1+ N N Reduced   R. ILIOPSOAS N None None None None 1+ N N Reduced   L. GLUTEUS MAX N None None None None 1+ N N Reduced   R. GLUTEUS MAX N None None None None 1+ N N Reduced   L. QUADRICEPS N None None None None 1+ N N Reduced   R. QUADRICEPS N None None None None 1+ N N Reduced   L. TIB ANTERIOR 2+ 2+  2+ None None Giant N N Discrete   R. TIB ANTERIOR 2+ 2+ 2+ None None Giant N N Discrete   L. GASTROCN (MED) 2- None None None None 2- N N Discrete   R. GASTROCN (MED) 2- None None None None 2- N N Discrete         · Left common peroneal motor responses revealed decreased amplitudes, prolonged distal latency and F wave were prolonged  · Left tibial motor responses revealed decreased amplitudes, prolonged distal latency, decreased conduction velocities from below knee to ankle and F wave were prolonged  · Left sural sensory responses were absent  · Left H-reflex responses were prolonged  · Right common peroneal motor responses were absent at the EDB, intact at the TA    · Right tibial motor responses revealed decreased amplitudes, prolonged distal latency, decreased conduction velocities from below knee to ankle and F wave were prolonged  · Right sural sensory responses were absent  · Right H-reflex responses were prolonged  · Needle examination with a concentric needle electrode of selected muscles of the bilateral lower extremities were abnormal with acute and chronic axonal loss observed distally.  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         Instrument used:  Teca Synergy        Performed by:          Iain Heaton MD

## 2021-01-05 PROBLEM — G60.9 HEREDITARY AND IDIOPATHIC NEUROPATHY, UNSPECIFIED: Status: ACTIVE | Noted: 2021-01-05

## 2021-01-05 PROBLEM — G62.9 NEUROPATHY: Status: ACTIVE | Noted: 2021-01-05

## 2021-01-08 ENCOUNTER — TREATMENT (OUTPATIENT)
Dept: PHYSICAL THERAPY | Facility: CLINIC | Age: 81
End: 2021-01-08

## 2021-01-08 ENCOUNTER — OFFICE VISIT (OUTPATIENT)
Dept: INTERNAL MEDICINE | Facility: CLINIC | Age: 81
End: 2021-01-08

## 2021-01-08 VITALS
WEIGHT: 214 LBS | OXYGEN SATURATION: 98 % | HEART RATE: 95 BPM | HEIGHT: 70 IN | DIASTOLIC BLOOD PRESSURE: 80 MMHG | SYSTOLIC BLOOD PRESSURE: 126 MMHG | RESPIRATION RATE: 16 BRPM | BODY MASS INDEX: 30.64 KG/M2 | TEMPERATURE: 97.5 F

## 2021-01-08 DIAGNOSIS — M12.571 TRAUMATIC ARTHRITIS OF RIGHT ANKLE: Primary | ICD-10-CM

## 2021-01-08 DIAGNOSIS — R29.898 BILATERAL LEG WEAKNESS: ICD-10-CM

## 2021-01-08 DIAGNOSIS — J40 BRONCHITIS: Primary | ICD-10-CM

## 2021-01-08 DIAGNOSIS — G62.9 NEUROPATHY: ICD-10-CM

## 2021-01-08 PROBLEM — A49.8 CLOSTRIDIOIDES DIFFICILE INFECTION: Status: ACTIVE | Noted: 2018-10-05

## 2021-01-08 PROBLEM — F41.8 MIXED ANXIETY AND DEPRESSIVE DISORDER: Status: ACTIVE | Noted: 2019-10-30

## 2021-01-08 PROBLEM — Z90.49 HISTORY OF PARTIAL SURGICAL REMOVAL OF COLON: Status: ACTIVE | Noted: 2018-07-10

## 2021-01-08 PROBLEM — R53.83 FATIGUE: Status: ACTIVE | Noted: 2019-10-30

## 2021-01-08 PROBLEM — Z00.00 WELL ADULT HEALTH CHECK: Status: ACTIVE | Noted: 2019-11-21

## 2021-01-08 PROBLEM — R07.89 CHEST DISCOMFORT: Status: ACTIVE | Noted: 2019-10-30

## 2021-01-08 PROBLEM — R74.8 ALKALINE PHOSPHATASE RAISED: Status: ACTIVE | Noted: 2020-02-21

## 2021-01-08 PROBLEM — Z87.442 HISTORY OF RENAL CALCULI: Status: ACTIVE | Noted: 2021-01-08

## 2021-01-08 PROBLEM — N18.30 STAGE 3 CHRONIC KIDNEY DISEASE: Status: ACTIVE | Noted: 2019-08-21

## 2021-01-08 PROBLEM — N52.9 PRIMARY ERECTILE DYSFUNCTION: Status: ACTIVE | Noted: 2021-01-08

## 2021-01-08 PROBLEM — R35.0 INCREASED FREQUENCY OF URINATION: Status: ACTIVE | Noted: 2019-02-18

## 2021-01-08 PROBLEM — K29.70 GASTRITIS: Status: ACTIVE | Noted: 2019-10-30

## 2021-01-08 PROBLEM — Z87.09 HISTORY OF ASTHMA: Status: ACTIVE | Noted: 2018-10-05

## 2021-01-08 PROBLEM — E66.9 OBESITY: Status: ACTIVE | Noted: 2019-05-21

## 2021-01-08 PROBLEM — Z86.14 HISTORY OF METHICILLIN RESISTANT STAPHYLOCOCCUS AUREUS INFECTION: Status: ACTIVE | Noted: 2018-11-16

## 2021-01-08 PROCEDURE — 97140 MANUAL THERAPY 1/> REGIONS: CPT | Performed by: PHYSICAL THERAPIST

## 2021-01-08 PROCEDURE — 97110 THERAPEUTIC EXERCISES: CPT | Performed by: PHYSICAL THERAPIST

## 2021-01-08 PROCEDURE — 99214 OFFICE O/P EST MOD 30 MIN: CPT | Performed by: INTERNAL MEDICINE

## 2021-01-08 PROCEDURE — 97161 PT EVAL LOW COMPLEX 20 MIN: CPT | Performed by: PHYSICAL THERAPIST

## 2021-01-08 RX ORDER — AZITHROMYCIN 250 MG/1
TABLET, FILM COATED ORAL
Qty: 6 TABLET | Refills: 0 | Status: SHIPPED | OUTPATIENT
Start: 2021-01-08 | End: 2021-02-23

## 2021-01-08 RX ORDER — AMOXICILLIN AND CLAVULANATE POTASSIUM 875; 125 MG/1; MG/1
1 TABLET, FILM COATED ORAL EVERY 12 HOURS SCHEDULED
Qty: 20 TABLET | Refills: 0 | Status: SHIPPED | OUTPATIENT
Start: 2021-01-08 | End: 2021-02-23

## 2021-01-08 RX ORDER — METHYLPREDNISOLONE 4 MG/1
TABLET ORAL
Qty: 21 EACH | Refills: 0 | Status: SHIPPED | OUTPATIENT
Start: 2021-01-08 | End: 2021-02-23

## 2021-01-08 NOTE — PROGRESS NOTES
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 He reports that he really doesn't have much pain but he has a lot of numbness in B LE. In the R LE his numbness is the entire foot and extends into the calf. The L LE he only has numbness in his foot. This numbness has been present for awhile, however, it worsened without cause approx 6-8 months ago.     He reports that this numbness has really affected his balance. He also feels like he has lost a lot of strength in his legs.    Quality of life: good    Pain  No pain reported    Social Support  Lives in: one-story house  Lives with: spouse    Diagnostic Tests  EMG: abnormal    Treatments  Previous treatment: physical therapy  Patient Goals  Patient goals for therapy: increased strength, independence with ADLs/IADLs, return to sport/leisure activities and increased motion             Objective          Observations     Right Ankle/Foot   Positive for adhesive scar and atrophy.       Palpation     Additional Palpation Details  Patient denies any tenderness to palpation.    Neurological Testing     Sensation     Ankle/Foot   Left Ankle/Foot   Diminished: light touch    Right Ankle/Foot   Diminished: light touch    Reflexes   Left   Patellar (L4): trace (1+)  Achilles (S1): trace (1+)    Right   Patellar (L4): trace (1+)  Achilles (S1): trace (1+)    Additional Neurological Details  Sensation is diminished below the knee bilaterally. Sensation in both feet is significantly diminished. Sensation is intact and equal above the knee on B LE.    Active Range of Motion   Left Ankle/Foot   Dorsiflexion (ke): 14 degrees   Plantar flexion:  42 degrees     Right Ankle/Foot   Dorsiflexion (ke): 8 degrees   Plantar flexion: 31 degrees     Passive Range of Motion   Left Ankle/Foot    Dorsiflexion (ke): 18 degrees   Plantar flexion: 45 degrees     Right Ankle/Foot    Dorsiflexion (ke): 11 degrees   Plantar flexion: 33 degrees     Joint Play     Right Ankle/Foot  Hypomobile in the talocrural joint, subtalar joint and midfoot.     Strength/Myotome Testing     Left Ankle/Foot   Dorsiflexion: 4  Plantar flexion: 4+  Inversion: 4+  Eversion: 4+    Right Ankle/Foot   Dorsiflexion: 3+  Plantar flexion: 4  Inversion: 3+  Eversion: 3+  Great toe extension strength: Patient is unable to actively perform     Additional Strength Details  Patient has decreased strength in bilateral hips and knees with R LE being more affected.    R hip: 3+  L hip: 4-     General Comments     Ankle/Foot Comments   Atrophy noted in R gastroc.     Patient has decreased balance and is unable to maintain balance >5 seconds with Romberg stance.    Patient immediately lost balance with tandem stance.    Patient ambulates with a decreased stance phase on the R LE.          Assessment & Plan     Assessment  Impairments: abnormal gait, abnormal or restricted ROM, activity intolerance, impaired balance, impaired physical strength, lacks appropriate home exercise program and weight-bearing intolerance  Assessment details: Patient is a 80 year old male who comes to physical therapy with complaints of decreased balance. Signs and symptoms are consistent with bilateral lower extremity weakness.  The patient currently has decreased ROM, decreased strength, and inability to perform all essential functional activities. Pt will benefit from skilled PT services to address the above issues.     Functional Limitations: carrying objects, walking, pulling, standing and stooping  Goals  Plan Goals: SHORT TERM GOALS:  2 weeks       1. Pt independent with HEP  2. Pt to demonstrate marcos hip strength 4/5 or greater  to improve stability with ambulation    LONG TERM GOALS:   6 weeks  1. Pt to demonstrate ability to perform full functional squat with good form and control of the hips and without increasing pain  2. Pt to demonstrate marcos hip strength to 4+/5 or greater to improve safety with ambulation on uneven surfaces  3. Pt to demonstrate ability to perform step up/down 8 inch step x10 safely and without increased fatigue           Manual Therapy:    11     mins  56677;  Therapeutic Exercise:    16     mins  28503;     Neuromuscular Marylou:        mins  23497;    Therapeutic Activity:          mins  08851;     Gait Training:           mins  35383;     Ultrasound:          mins  06099;    Electrical Stimulation:         mins  52212 ( );  Dry Needling          mins self-pay    Timed Treatment:   27   mins   Total Treatment:     61   mins    PT SIGNATURE: Rachael Christopher PT   DATE TREATMENT INITIATED: 1/8/2021    Initial Certification  Certification Period: 4/8/2021  I certify that the therapy services are furnished while this patient is under my care.  The services outlined above are required by this patient, and will be reviewed every 90 days.     PHYSICIAN: Madison Manriquez MD      DATE:     Please sign and return via fax to [phone].. Thank you, [facility].

## 2021-01-08 NOTE — PROGRESS NOTES
"Subjective     Patient ID: Cipriano Montes is a 80 y.o. male. Patient is here for management of multiple medical problems.     Chief Complaint   Patient presents with   • Cough   • Nasal Congestion     History of Present Illness       Cough. X 2-4 weeks. Had abx recnetly and now sputum is clear.     Cough med Claritin and nasal spray.           The following portions of the patient's history were reviewed and updated as appropriate: allergies, current medications, past family history, past medical history, past social history, past surgical history and problem list.    Review of Systems   Constitutional: Negative for fatigue.   HENT: Positive for congestion, postnasal drip, rhinorrhea, sinus pressure and sinus pain.    Eyes: Negative for photophobia.   Respiratory: Positive for cough and shortness of breath.    Psychiatric/Behavioral: Negative for self-injury and sleep disturbance. The patient is not nervous/anxious.    All other systems reviewed and are negative.      Current Outpatient Medications:   •  acetaminophen (Tylenol 8 Hour) 650 MG 8 hr tablet, Every 6 (Six) Hours., Disp: , Rfl:   •  albuterol sulfate HFA (Ventolin HFA) 108 (90 Base) MCG/ACT inhaler, Inhale 1 puff Every 6 (Six) Hours As Needed for Wheezing., Disp: 1 g, Rfl: 3  •  cyanocobalamin 1000 MCG/ML injection, Inject 1,000 mcg into the appropriate muscle as directed by prescriber Every 28 (Twenty-Eight) Days., Disp: , Rfl:   •  ipratropium (ATROVENT) 0.06 % nasal spray, 2 sprays into the nostril(s) as directed by provider every night at bedtime., Disp: 15 mL, Rfl: 12  •  levothyroxine (SYNTHROID, LEVOTHROID) 100 MCG tablet, Take 1 tablet by mouth Daily., Disp: 90 tablet, Rfl: 3  •  NIFEdipine XL (PROCARDIA XL) 60 MG 24 hr tablet, Take 1 tablet by mouth Daily., Disp: 90 tablet, Rfl: 3  •  Probiotic Product (PROBIOTIC-10 PO), Probiotic  1 capsule daily, Disp: , Rfl:   •  Syringe/Needle, Disp, (B-D 3CC LUER-CAITLIN SYR 25GX1\") 25G X 1\" 3 ML misc, , " "Disp: , Rfl:   •  tamsulosin (FLOMAX) 0.4 MG capsule 24 hr capsule, Take 1 capsule by mouth Daily., Disp: 90 capsule, Rfl: 3  •  amoxicillin-clavulanate (Augmentin) 875-125 MG per tablet, Take 1 tablet by mouth Every 12 (Twelve) Hours., Disp: 20 tablet, Rfl: 0  •  azithromycin (Zithromax) 250 MG tablet, Take 2 tablets the first day, then 1 tablet daily for 4 days., Disp: 6 tablet, Rfl: 0  •  methylPREDNISolone (MEDROL) 4 MG dose pack, Take as directed on package instructions., Disp: 21 each, Rfl: 0    Objective      Blood pressure 126/80, pulse 95, temperature 97.5 °F (36.4 °C), resp. rate 16, height 177.8 cm (70\"), weight 97.1 kg (214 lb), SpO2 98 %.    Physical Exam     General Appearance:    Alert, cooperative, no distress, appears stated age   Head:    Normocephalic, without obvious abnormality, atraumatic   Eyes:    PERRL, conjunctiva/corneas clear, EOM's intact   Ears:    Normal TM's and external ear canals, both ears   Nose:   Nares normal, septum midline, mucosa normal, no drainage   or sinus tenderness   Throat:   Lips, mucosa, and tongue normal; teeth and gums normal   Neck:   Supple, symmetrical, trachea midline, no adenopathy;        thyroid:  No enlargement/tenderness/nodules; no carotid    bruit or JVD   Back:     Symmetric, no curvature, ROM normal, no CVA tenderness   Lungs:     Wheezing and prolonged expirtory bs.     to auscultation bilaterally, respirations unlabored   Chest wall:    No tenderness or deformity   Heart:    Regular rate and rhythm, S1 and S2 normal, no murmur,        rub or gallop   Abdomen:     Soft, non-tender, bowel sounds active all four quadrants,     no masses, no organomegaly   Extremities:   Extremities normal, atraumatic, no cyanosis or edema   Pulses:   2+ and symmetric all extremities   Skin:   Skin color, texture, turgor normal, no rashes or lesions   Lymph nodes:   Cervical, supraclavicular, and axillary nodes normal   Neurologic:   CNII-XII intact. Normal strength, " sensation and reflexes       throughout      Results for orders placed or performed in visit on 12/24/20   COVID-19 PCR, [facility], NP SWAB IN [facility] MEDIA 24-30 HR TAT - Swab, Nasopharynx    Specimen: Nasopharynx; Swab   Result Value Ref Range    SARS-CoV-2 MARY Not Detected Not Detected         Assessment/Plan       Diagnoses and all orders for this visit:    1. Bronchitis (Primary)  -     amoxicillin-clavulanate (Augmentin) 875-125 MG per tablet; Take 1 tablet by mouth Every 12 (Twelve) Hours.  Dispense: 20 tablet; Refill: 0  -     azithromycin (Zithromax) 250 MG tablet; Take 2 tablets the first day, then 1 tablet daily for 4 days.  Dispense: 6 tablet; Refill: 0  -     methylPREDNISolone (MEDROL) 4 MG dose pack; Take as directed on package instructions.  Dispense: 21 each; Refill: 0      No follow-ups on file.          There are no Patient Instructions on file for this visit.     Ron Velazquez MD    Assessment/Plan

## 2021-01-12 ENCOUNTER — TREATMENT (OUTPATIENT)
Dept: PHYSICAL THERAPY | Facility: CLINIC | Age: 81
End: 2021-01-12

## 2021-01-12 DIAGNOSIS — G62.9 NEUROPATHY: ICD-10-CM

## 2021-01-12 DIAGNOSIS — R29.898 BILATERAL LEG WEAKNESS: Primary | ICD-10-CM

## 2021-01-12 DIAGNOSIS — M12.571 TRAUMATIC ARTHRITIS OF RIGHT ANKLE: ICD-10-CM

## 2021-01-12 PROCEDURE — 97140 MANUAL THERAPY 1/> REGIONS: CPT | Performed by: PHYSICAL THERAPIST

## 2021-01-12 PROCEDURE — 97110 THERAPEUTIC EXERCISES: CPT | Performed by: PHYSICAL THERAPIST

## 2021-01-12 NOTE — PROGRESS NOTES
Physical Therapy Daily Progress Note    Patient Information  Cipriano Montes  1940      Visit # : 2    Cipriano Montes reports 0/10 pain today at rest.  Patient reports he had a little muscle soreness in his legs following last session but he has had no pain. He stated he went to the gym yesterday for the first time in months. He rode a stationery bike and walked on the treadmill but he states he was only able to tolerate a couple minutes of each before his legs were really fatigued.        Objective Pt presents to PT today with no distress noted at rest.     Patient continues to ambulate with decreased stance phase on R LE.    Patient demonstrates decreased balance and requires use of counter top for UE support to complete side stepping.      See Exercise, Manual, and Modality Logs for complete treatment.     Assessment/Plan  Patient tolerated session well with no pain present throughout session. He fatigued quickly and required rest breaks after each exercise. Patient was progressed today, he will be reassessed next session for tolerance.      Progress per Plan of Care  PT will continue to monitor patients signs and symptoms and progress patient as it is tolerated.    Visit Diagnoses:    ICD-10-CM ICD-9-CM   1. Bilateral leg weakness  R29.898 729.89   2. Traumatic arthritis of right ankle  M12.571 716.17   3. Neuropathy  G62.9 355.9            Manual Therapy:    12     mins  01553;  Therapeutic Exercise:    28     mins  89237;     Neuromuscular Marylou:        mins  51231;    Therapeutic Activity:          mins  60809;     Gait Training:           mins  94223;     Ultrasound:          mins  17116;    Electrical Stimulation:         mins  38437 ( );  Dry Needling          mins self-pay    Timed Treatment:   40   mins   Total Treatment:     59   mins          Rachael Christopher PT  Physical Therapist

## 2021-01-14 ENCOUNTER — TREATMENT (OUTPATIENT)
Dept: PHYSICAL THERAPY | Facility: CLINIC | Age: 81
End: 2021-01-14

## 2021-01-14 DIAGNOSIS — M12.571 TRAUMATIC ARTHRITIS OF RIGHT ANKLE: ICD-10-CM

## 2021-01-14 DIAGNOSIS — R29.898 BILATERAL LEG WEAKNESS: Primary | ICD-10-CM

## 2021-01-14 DIAGNOSIS — G62.9 NEUROPATHY: ICD-10-CM

## 2021-01-14 PROCEDURE — 97110 THERAPEUTIC EXERCISES: CPT | Performed by: PHYSICAL THERAPIST

## 2021-01-14 PROCEDURE — 97530 THERAPEUTIC ACTIVITIES: CPT | Performed by: PHYSICAL THERAPIST

## 2021-01-14 PROCEDURE — 97140 MANUAL THERAPY 1/> REGIONS: CPT | Performed by: PHYSICAL THERAPIST

## 2021-01-14 NOTE — PROGRESS NOTES
Physical Therapy Daily Progress Note    Patient Information  Cipriano Montes  1940      Visit # : 3    Cipriano Montes reports 0/10 pain today at rest.  Patient reports he has had a little muscle soreness following last session but it was tolerable. Patient reports he had some pain in his R ankle last night but this is better today.        Objective Pt presents to PT today with no distress noted at rest.    Patient has increased tenderness on R lateral malleolus.    Patient has increased stiffness at first MTP joint on the R with decreased great toe extension noted.      See Exercise, Manual, and Modality Logs for complete treatment.     Assessment/Plan  Patient tolerated session well. He continues to fatigue quickly and require rest breaks. He had no reports of pain throughout session. He continues to ambulate with a noticeable gait deficit due to lower extremity weakness and lack of ROM in the R ankle.      Progress per Plan of Care  PT will continue to monitor patients signs and symptoms and progress patient as it is tolerated.    Visit Diagnoses:    ICD-10-CM ICD-9-CM   1. Bilateral leg weakness  R29.898 729.89   2. Traumatic arthritis of right ankle  M12.571 716.17   3. Neuropathy  G62.9 355.9            Manual Therapy:    16     mins  16784;  Therapeutic Exercise:    28     mins  37413;     Neuromuscular Marylou:        mins  87256;    Therapeutic Activity:     9     mins  95430;     Gait Training:           mins  14486;     Ultrasound:          mins  70174;    Electrical Stimulation:         mins  38771 ( );  Dry Needling          mins self-pay    Timed Treatment:   53   mins   Total Treatment:     64   mins          Rachael Christopher PT  Physical Therapist

## 2021-01-19 ENCOUNTER — TREATMENT (OUTPATIENT)
Dept: PHYSICAL THERAPY | Facility: CLINIC | Age: 81
End: 2021-01-19

## 2021-01-19 DIAGNOSIS — R29.898 BILATERAL LEG WEAKNESS: Primary | ICD-10-CM

## 2021-01-19 DIAGNOSIS — M12.571 TRAUMATIC ARTHRITIS OF RIGHT ANKLE: ICD-10-CM

## 2021-01-19 DIAGNOSIS — G62.9 NEUROPATHY: ICD-10-CM

## 2021-01-19 PROCEDURE — 97110 THERAPEUTIC EXERCISES: CPT | Performed by: PHYSICAL THERAPIST

## 2021-01-19 PROCEDURE — 97140 MANUAL THERAPY 1/> REGIONS: CPT | Performed by: PHYSICAL THERAPIST

## 2021-01-19 PROCEDURE — 97530 THERAPEUTIC ACTIVITIES: CPT | Performed by: PHYSICAL THERAPIST

## 2021-01-19 NOTE — PROGRESS NOTES
Physical Therapy Daily Progress Note    Patient Information  Cipriano Montes  1940      Visit # : 4    Cipriano Montes reports 0/10 pain today at rest.  Patient reports that he feels his legs are starting to get used to the exercise. He stated he has been less sore with the exercises at home. He reports that he has been going to the gym more often to walk.         Objective Pt presents to PT today with no distress noted at rest.     Patient has tenderness to palpation along R anterior tibialis.    Patient continues to have restricted R great toe extension.      See Exercise, Manual, and Modality Logs for complete treatment.     Assessment/Plan  Patient tolerated session well with no pain present throughout session. Patient requires rest breaks at times due to increased fatigue but this is relieved with a short rest break. He continues to demonstrate reduced ROM in the R ankle and great toe.     Progress per Plan of Care  PT will continue to monitor patients signs and symptoms and progress patient as it is tolerated.    Visit Diagnoses:    ICD-10-CM ICD-9-CM   1. Bilateral leg weakness  R29.898 729.89   2. Traumatic arthritis of right ankle  M12.571 716.17   3. Neuropathy  G62.9 355.9            Manual Therapy:    14     mins  60897;  Therapeutic Exercise:    31     mins  43564;     Neuromuscular Marylou:        mins  44245;    Therapeutic Activity:     11     mins  07251;     Gait Training:           mins  05827;     Ultrasound:          mins  70472;    Electrical Stimulation:         mins  32653 ( );  Dry Needling          mins self-pay    Timed Treatment:   56   mins   Total Treatment:     67   mins          Rachael Christopher, PT  Physical Therapist

## 2021-01-25 ENCOUNTER — OFFICE VISIT (OUTPATIENT)
Dept: ORTHOPEDIC SURGERY | Facility: CLINIC | Age: 81
End: 2021-01-25

## 2021-01-25 VITALS — BODY MASS INDEX: 30.64 KG/M2 | OXYGEN SATURATION: 100 % | WEIGHT: 214 LBS | HEIGHT: 70 IN | HEART RATE: 73 BPM

## 2021-01-25 DIAGNOSIS — I87.8 VENOUS STASIS: ICD-10-CM

## 2021-01-25 DIAGNOSIS — M12.571 TRAUMATIC ARTHRITIS OF RIGHT ANKLE: ICD-10-CM

## 2021-01-25 DIAGNOSIS — G62.9 NEUROPATHY: Primary | ICD-10-CM

## 2021-01-25 PROCEDURE — 99212 OFFICE O/P EST SF 10 MIN: CPT | Performed by: ORTHOPAEDIC SURGERY

## 2021-01-25 NOTE — PROGRESS NOTES
ESTABLISHED PATIENT    Patient: Cipriano Montes  : 1940    Primary Care Provider: Ron Velazquez MD    Requesting Provider: As above    Follow-up (EMG follow up of Right foot pain )      History    Chief Complaint: Neuropathy    History of Present Illness: He returns for follow-up of his sensorimotor neuropathy.  He did have the nerve studies and they do show sensorimotor neuropathy.  Nothing that suggest spinal stenosis.  Therefore I think he needs to be evaluated by neurology to determine whether a source for neuropathy can be identified.  The dense neuropathy also explains why he can play golf on such a severely degenerated ankle.  He did do the physical therapy I recommended he is not certain if it helps much with balance.  He is wearing the compression stockings.  I reviewed the EMG nerve conduction studies done 2021 by Dr. Arellano    Current Outpatient Medications on File Prior to Visit   Medication Sig Dispense Refill   • acetaminophen (Tylenol 8 Hour) 650 MG 8 hr tablet Every 6 (Six) Hours.     • albuterol sulfate HFA (Ventolin HFA) 108 (90 Base) MCG/ACT inhaler Inhale 1 puff Every 6 (Six) Hours As Needed for Wheezing. 1 g 3   • amoxicillin-clavulanate (Augmentin) 875-125 MG per tablet Take 1 tablet by mouth Every 12 (Twelve) Hours. 20 tablet 0   • azithromycin (Zithromax) 250 MG tablet Take 2 tablets the first day, then 1 tablet daily for 4 days. 6 tablet 0   • cyanocobalamin 1000 MCG/ML injection Inject 1,000 mcg into the appropriate muscle as directed by prescriber Every 28 (Twenty-Eight) Days.     • ipratropium (ATROVENT) 0.06 % nasal spray 2 sprays into the nostril(s) as directed by provider every night at bedtime. 15 mL 12   • levothyroxine (SYNTHROID, LEVOTHROID) 100 MCG tablet Take 1 tablet by mouth Daily. 90 tablet 3   • methylPREDNISolone (MEDROL) 4 MG dose pack Take as directed on package instructions. 21 each 0   • NIFEdipine XL (PROCARDIA XL) 60 MG 24 hr tablet Take 1 tablet  "by mouth Daily. 90 tablet 3   • Probiotic Product (PROBIOTIC-10 PO) Probiotic   1 capsule daily     • Syringe/Needle, Disp, (B-D 3CC LUER-CAITLIN SYR 25GX1\") 25G X 1\" 3 ML misc      • tamsulosin (FLOMAX) 0.4 MG capsule 24 hr capsule Take 1 capsule by mouth Daily. 90 capsule 3     No current facility-administered medications on file prior to visit.       Allergies   Allergen Reactions   • Oxycodone-Acetaminophen GI Intolerance      Past Medical History:   Diagnosis Date   • Hypertension    • Hypothyroidism    • Thyroid disorder      Past Surgical History:   Procedure Laterality Date   • COLON SURGERY     • TONSILLECTOMY     • TONSILLECTOMY       Family History   Problem Relation Age of Onset   • Cancer Other    • Diabetes Other    • Prostate cancer Other    • Leukemia Mother    • Diabetes Father       Social History     Socioeconomic History   • Marital status:      Spouse name: Not on file   • Number of children: Not on file   • Years of education: Not on file   • Highest education level: Not on file   Tobacco Use   • Smoking status: Never Smoker   • Smokeless tobacco: Never Used   Substance and Sexual Activity   • Alcohol use: Yes     Comment: unknown   • Drug use: No   • Sexual activity: Defer        Review of Systems   Constitutional: Positive for fatigue.   HENT: Negative.    Eyes: Negative.    Respiratory: Negative.    Cardiovascular: Negative.    Gastrointestinal: Negative.    Endocrine: Negative.    Genitourinary: Positive for difficulty urinating.   Musculoskeletal: Positive for arthralgias.   Skin: Negative.    Allergic/Immunologic: Negative.    Neurological: Positive for tremors and numbness.   Hematological: Negative.    Psychiatric/Behavioral: Positive for sleep disturbance.       The following portions of the patient's history were reviewed and updated as appropriate: allergies, current medications, past family history, past medical history, past social history, past surgical history and problem " "list.    Physical Exam:   Pulse 73   Ht 177.8 cm (70\")   Wt 97.1 kg (214 lb)   SpO2 100%   BMI 30.71 kg/m²   GENERAL: Body habitus: obese    Lower extremity edema: Left: 1+ pitting; Right: 1+ pitting    Gait: antalgic with the first few steps     Mental Status:  awake and alert; oriented to person, place, and time  MSK:  No change in the stiff thickened right ankle    NEURO Sensation:  diminished    Medical Decision Making    Data Review:   reviewed prior lab results    Assessment/Plan/Diagnosis/Treatment Options:   1. Neuropathy  As above I think he needs to be seen in consultation to determine whether there is a treatable cause of the neuropathy.  This definitely explains why he can do so much on an ankle that has such a severely degenerated joint.  - Ambulatory Referral to Neurology    2. Traumatic arthritis of right ankle  As above I think we should have him see neurology.  We need to x-ray the ankle about once a year.  I will see him in a year or sooner if he has any problems    3. Venous stasis  Definitely needs to wear compression stockings        Madison García MD                      "

## 2021-02-23 ENCOUNTER — LAB (OUTPATIENT)
Dept: LAB | Facility: HOSPITAL | Age: 81
End: 2021-02-23

## 2021-02-23 ENCOUNTER — OFFICE VISIT (OUTPATIENT)
Dept: NEUROLOGY | Facility: CLINIC | Age: 81
End: 2021-02-23

## 2021-02-23 VITALS
WEIGHT: 211 LBS | BODY MASS INDEX: 30.21 KG/M2 | OXYGEN SATURATION: 96 % | SYSTOLIC BLOOD PRESSURE: 134 MMHG | DIASTOLIC BLOOD PRESSURE: 72 MMHG | RESPIRATION RATE: 18 BRPM | HEIGHT: 70 IN | HEART RATE: 92 BPM

## 2021-02-23 DIAGNOSIS — R78.71 ABNORMAL LEAD LEVEL IN BLOOD: ICD-10-CM

## 2021-02-23 DIAGNOSIS — G60.9 HEREDITARY AND IDIOPATHIC NEUROPATHY, UNSPECIFIED: Primary | ICD-10-CM

## 2021-02-23 DIAGNOSIS — G62.9 POLYNEUROPATHY: ICD-10-CM

## 2021-02-23 DIAGNOSIS — G25.0 TREMOR, ESSENTIAL: ICD-10-CM

## 2021-02-23 PROCEDURE — 99215 OFFICE O/P EST HI 40 MIN: CPT | Performed by: PSYCHIATRY & NEUROLOGY

## 2021-02-23 NOTE — PROGRESS NOTES
"Chief Complaint  Establish Care (Neuropathy, balance issues )    Subjective          Cipriano Montes presents to [facility] returns in follow up for peripheral neuropathy.    History of Present Illness    80 y.o. male returns in follow up for peripheral neuropathy.  Sx of balance and numbness in LE.      Previous right tibia/fibular fx three years ago.     Balance off with closing eyes in shower.  Numbness in right foot from mid foot to heel.  Left foot numbness 4 years ago.      Denies pain.          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     Objective      Vital Signs:     /72   Pulse 92   Resp 18   Ht 177.8 cm (70\")   Wt 95.7 kg (211 lb)   SpO2 96%   BMI 30.28 kg/m²       Physical Exam  Eyes:      Extraocular Movements: EOM normal.      Pupils: Pupils are equal, round, and reactive to light.   Neurological:      Mental Status: He is oriented to person, place, and time.      Coordination: Romberg Test abnormal. Finger-Nose-Finger Test and Heel to Shin Test normal.      Gait: Tandem walk abnormal.      Deep Tendon Reflexes: Strength normal.      Reflex Scores:       Tricep reflexes are 1+ on the right side and 1+ on the left side.       Bicep reflexes are 1+ on the right side and 1+ on the left side.       Brachioradialis reflexes are 1+ on the right side and 1+ on the left side.       Patellar reflexes are 0 on the right side and 0 on the left side.       Achilles reflexes are 0 on the right side and 0 on the left side.  Psychiatric:         Speech: Speech normal.        Neurologic Exam     Mental Status   Oriented to person, place, and time.   Registration: recalls 3 of 3 objects. Recall at 5 minutes: recalls 3 of 3 objects. Follows 3 step commands.   Attention: normal. Concentration: normal.   Speech: speech is normal   Level of consciousness: alert  Knowledge: good and consistent with education.   Able to name " object. Able to read. Able to repeat. Able to write. Normal comprehension.     Cranial Nerves     CN II   Visual fields full to confrontation.   Visual acuity: normal  Right visual field deficit: none  Left visual field deficit: none     CN III, IV, VI   Pupils are equal, round, and reactive to light.  Extraocular motions are normal.   Nystagmus: none   Diplopia: none  Ophthalmoparesis: none  Upgaze: normal  Downgaze: normal  Conjugate gaze: present  Vestibulo-ocular reflex: present    CN V   Facial sensation intact.   Right corneal reflex: normal  Left corneal reflex: normal    CN VII   Right facial weakness: none  Left facial weakness: none    CN VIII   Hearing: intact    CN IX, X   Palate: symmetric  Right gag reflex: normal  Left gag reflex: normal    CN XI   Right sternocleidomastoid strength: normal  Left sternocleidomastoid strength: normal    CN XII   Tongue: not atrophic  Fasciculations: absent  Tongue deviation: none    Motor Exam   Muscle bulk: normal  Overall muscle tone: normal  Right arm tone: normal  Left arm tone: normal  Right leg tone: normal  Left leg tone: normal    Strength   Strength 5/5 throughout.     Sensory Exam   Right leg light touch: decreased from knee  Left leg light touch: decreased from knee  Right leg pinprick: decreased from knee  Left leg pinprick: decreased from knee    Gait, Coordination, and Reflexes     Gait  Gait: shuffling    Coordination   Romberg: positive  Finger to nose coordination: normal  Heel to shin coordination: normal  Tandem walking coordination: abnormal    Tremor   Resting tremor: absent  Intention tremor: absent  Action tremor: absent    Reflexes   Right brachioradialis: 1+  Left brachioradialis: 1+  Right biceps: 1+  Left biceps: 1+  Right triceps: 1+  Left triceps: 1+  Right patellar: 0  Left patellar: 0  Right achilles: 0  Left achilles: 0      Result Review :                 Assessment and Plan    Diagnoses and all orders for this visit:    1. Hereditary  and idiopathic neuropathy, unspecified  (Primary)  -     CBC & Differential; Future  -     Celiac Disease Panel; Future  -     CK; Future  -     Comprehensive Metabolic Panel; Future  -     Cryoglobulin; Future  -     Hemoglobin A1c; Future  -     TSH; Future  -     Vitamin B12 & Folate; Future  -     Immunofixation, Serum; Future  -     DORA by IFA, Reflex 9-biomarkers profile; Future  -     Angiotensin Converting Enzyme; Future  -     Rheumatoid Factor; Future  -     Sedimentation Rate; Future    2. Abnormal lead level in blood   -     Hemoglobin A1c; Future    3. 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 Tremor, essential  Assessment & Plan:  Watchful waiting         Follow Up   No follow-ups on file.  Patient was given instructions and counseling regarding his condition or for health maintenance advice. Please see specific information pulled into the AVS if appropriate.

## 2021-02-24 ENCOUNTER — LAB (OUTPATIENT)
Dept: LAB | Facility: HOSPITAL | Age: 81
End: 2021-02-24

## 2021-02-24 DIAGNOSIS — R78.71 ABNORMAL LEAD LEVEL IN BLOOD: ICD-10-CM

## 2021-02-24 DIAGNOSIS — G60.9 HEREDITARY AND IDIOPATHIC NEUROPATHY, UNSPECIFIED: ICD-10-CM

## 2021-02-24 LAB
ALBUMIN SERPL-MCNC: 3.8 G/DL (ref 3.5–5.2)
ALBUMIN/GLOB SERPL: 1.5 G/DL
ALP SERPL-CCNC: 153 U/L (ref 39–117)
ALT SERPL W P-5'-P-CCNC: 43 U/L (ref 1–41)
ANION GAP SERPL CALCULATED.3IONS-SCNC: 7.7 MMOL/L (ref 5–15)
AST SERPL-CCNC: <5 U/L (ref 1–40)
BASOPHILS # BLD AUTO: 0.06 10*3/MM3 (ref 0–0.2)
BASOPHILS NFR BLD AUTO: 0.9 % (ref 0–1.5)
BILIRUB SERPL-MCNC: 0.3 MG/DL (ref 0–1.2)
BUN SERPL-MCNC: 19 MG/DL (ref 8–23)
BUN/CREAT SERPL: 18.4 (ref 7–25)
CALCIUM SPEC-SCNC: 8.9 MG/DL (ref 8.6–10.5)
CHLORIDE SERPL-SCNC: 105 MMOL/L (ref 98–107)
CK SERPL-CCNC: 75 U/L (ref 20–200)
CO2 SERPL-SCNC: 26.3 MMOL/L (ref 22–29)
CREAT SERPL-MCNC: 1.03 MG/DL (ref 0.76–1.27)
DEPRECATED RDW RBC AUTO: 43.9 FL (ref 37–54)
EOSINOPHIL # BLD AUTO: 0.33 10*3/MM3 (ref 0–0.4)
EOSINOPHIL NFR BLD AUTO: 4.7 % (ref 0.3–6.2)
ERYTHROCYTE [DISTWIDTH] IN BLOOD BY AUTOMATED COUNT: 13.6 % (ref 12.3–15.4)
ERYTHROCYTE [SEDIMENTATION RATE] IN BLOOD: 10 MM/HR (ref 0–15)
FOLATE SERPL-MCNC: 12.5 NG/ML (ref 4.78–24.2)
GFR SERPL CREATININE-BSD FRML MDRD: 70 ML/MIN/1.73
GLOBULIN UR ELPH-MCNC: 2.5 GM/DL
GLUCOSE SERPL-MCNC: 105 MG/DL (ref 65–99)
HBA1C MFR BLD: 6.2 % (ref 4.8–5.6)
HCT VFR BLD AUTO: 49 % (ref 37.5–51)
HGB BLD-MCNC: 16.1 G/DL (ref 13–17.7)
IMM GRANULOCYTES # BLD AUTO: 0.01 10*3/MM3 (ref 0–0.05)
IMM GRANULOCYTES NFR BLD AUTO: 0.1 % (ref 0–0.5)
LYMPHOCYTES # BLD AUTO: 1.61 10*3/MM3 (ref 0.7–3.1)
LYMPHOCYTES NFR BLD AUTO: 23.2 % (ref 19.6–45.3)
MCH RBC QN AUTO: 28.8 PG (ref 26.6–33)
MCHC RBC AUTO-ENTMCNC: 32.9 G/DL (ref 31.5–35.7)
MCV RBC AUTO: 87.5 FL (ref 79–97)
MONOCYTES # BLD AUTO: 0.8 10*3/MM3 (ref 0.1–0.9)
MONOCYTES NFR BLD AUTO: 11.5 % (ref 5–12)
NEUTROPHILS NFR BLD AUTO: 4.14 10*3/MM3 (ref 1.7–7)
NEUTROPHILS NFR BLD AUTO: 59.6 % (ref 42.7–76)
NRBC BLD AUTO-RTO: 0 /100 WBC (ref 0–0.2)
PLATELET # BLD AUTO: 198 10*3/MM3 (ref 140–450)
PMV BLD AUTO: 9.2 FL (ref 6–12)
POTASSIUM SERPL-SCNC: 4.1 MMOL/L (ref 3.5–5.2)
PROT SERPL-MCNC: 6.3 G/DL (ref 6–8.5)
RBC # BLD AUTO: 5.6 10*6/MM3 (ref 4.14–5.8)
SODIUM SERPL-SCNC: 139 MMOL/L (ref 136–145)
TSH SERPL DL<=0.05 MIU/L-ACNC: 0.61 UIU/ML (ref 0.27–4.2)
VIT B12 BLD-MCNC: 284 PG/ML (ref 211–946)
WBC # BLD AUTO: 6.95 10*3/MM3 (ref 3.4–10.8)

## 2021-02-24 PROCEDURE — 86431 RHEUMATOID FACTOR QUANT: CPT

## 2021-02-24 PROCEDURE — 85651 RBC SED RATE NONAUTOMATED: CPT

## 2021-02-24 PROCEDURE — 82595 ASSAY OF CRYOGLOBULIN: CPT

## 2021-02-24 PROCEDURE — 82784 ASSAY IGA/IGD/IGG/IGM EACH: CPT

## 2021-02-24 PROCEDURE — 86255 FLUORESCENT ANTIBODY SCREEN: CPT

## 2021-02-24 PROCEDURE — 84443 ASSAY THYROID STIM HORMONE: CPT

## 2021-02-24 PROCEDURE — 83516 IMMUNOASSAY NONANTIBODY: CPT

## 2021-02-24 PROCEDURE — 86235 NUCLEAR ANTIGEN ANTIBODY: CPT

## 2021-02-24 PROCEDURE — 36415 COLL VENOUS BLD VENIPUNCTURE: CPT

## 2021-02-24 PROCEDURE — 85025 COMPLETE CBC W/AUTO DIFF WBC: CPT

## 2021-02-24 PROCEDURE — 83036 HEMOGLOBIN GLYCOSYLATED A1C: CPT

## 2021-02-24 PROCEDURE — 82607 VITAMIN B-12: CPT

## 2021-02-24 PROCEDURE — 86334 IMMUNOFIX E-PHORESIS SERUM: CPT

## 2021-02-24 PROCEDURE — 86038 ANTINUCLEAR ANTIBODIES: CPT

## 2021-02-24 PROCEDURE — 86225 DNA ANTIBODY NATIVE: CPT

## 2021-02-24 PROCEDURE — 82746 ASSAY OF FOLIC ACID SERUM: CPT

## 2021-02-24 PROCEDURE — 80053 COMPREHEN METABOLIC PANEL: CPT

## 2021-02-24 PROCEDURE — 82550 ASSAY OF CK (CPK): CPT

## 2021-02-24 PROCEDURE — 82164 ANGIOTENSIN I ENZYME TEST: CPT

## 2021-02-25 ENCOUNTER — TELEPHONE (OUTPATIENT)
Dept: INTERNAL MEDICINE | Facility: CLINIC | Age: 81
End: 2021-02-25

## 2021-02-25 DIAGNOSIS — J45.909 MODERATE ASTHMA, UNSPECIFIED WHETHER COMPLICATED, UNSPECIFIED WHETHER PERSISTENT: Primary | ICD-10-CM

## 2021-02-25 LAB
ACE SERPL-CCNC: 57 U/L (ref 14–82)
CHROMATIN AB SERPL-ACNC: <10 IU/ML (ref 0–14)
ENDOMYSIUM IGA SER QL: NEGATIVE
IGA SERPL-MCNC: 340 MG/DL (ref 61–437)
IGA SERPL-MCNC: 347 MG/DL (ref 61–437)
IGG SERPL-MCNC: 903 MG/DL (ref 603–1613)
IGM SERPL-MCNC: 131 MG/DL (ref 15–143)
PROT PATTERN SERPL IFE-IMP: NORMAL
TTG IGA SER-ACNC: <2 U/ML (ref 0–3)

## 2021-02-25 NOTE — TELEPHONE ENCOUNTER
Caller: Cipriano Montes    Relationship: Self    Best call back number: [phone]    What orders are you requesting (i.e. lab or imaging): referral to pulmonology    In what timeframe would the patient need to come in: asap    Where will you receive your lab/imaging services: Dr. Kory Powell    Additional notes: Patient's wife called and stated that she would liek a referral to a pulmonologist, DR. KORY POWELL; [phone]: Phone,  to treat his asthma and coughing.

## 2021-03-01 LAB
ANA HOMOGEN TITR SER: ABNORMAL {TITER}
ANA TITR SER IF: POSITIVE {TITER}
CENTROMERE B AB SER-ACNC: 0.2 AI (ref 0–0.9)
CHROMATIN AB SERPL-ACNC: <0.2 AI (ref 0–0.9)
CRYOGLOB SER QL 1D COLD INC: NORMAL
DSDNA AB SER-ACNC: <1 IU/ML (ref 0–9)
ENA JO1 AB SER-ACNC: <0.2 AI (ref 0–0.9)
ENA RNP AB SER-ACNC: <0.2 AI (ref 0–0.9)
ENA SCL70 AB SER-ACNC: <0.2 AI (ref 0–0.9)
ENA SM AB SER-ACNC: <0.2 AI (ref 0–0.9)
ENA SS-A AB SER-ACNC: <0.2 AI (ref 0–0.9)
ENA SS-B AB SER-ACNC: <0.2 AI (ref 0–0.9)
LABORATORY COMMENT REPORT: ABNORMAL
Lab: ABNORMAL
Lab: ABNORMAL

## 2021-03-08 RX ORDER — NIFEDIPINE 60 MG/1
60 TABLET, EXTENDED RELEASE ORAL DAILY
Qty: 90 TABLET | Refills: 3 | Status: SHIPPED | OUTPATIENT
Start: 2021-03-08 | End: 2022-06-03

## 2021-03-08 NOTE — TELEPHONE ENCOUNTER
Caller: Cipriano Montes    Relationship: Self    Best call back number:[phone]     Medication needed:   Requested Prescriptions     Pending Prescriptions Disp Refills   • NIFEdipine XL (PROCARDIA XL) 60 MG 24 hr tablet 90 tablet 3     Sig: Take 1 tablet by mouth Daily.       When do you need the refill by: 03/09/21    What details did the patient provide when requesting the medication:     Does the patient have less than a 3 day supply:  [] Yes  [] No    What is the patient's preferred pharmacy: [id number] [place] [id number] [address] AT [facility] [address] - [phone]  - [phone] FX

## 2021-03-22 ENCOUNTER — OFFICE VISIT (OUTPATIENT)
Dept: NEUROLOGY | Facility: CLINIC | Age: 81
End: 2021-03-22

## 2021-03-22 VITALS
HEART RATE: 81 BPM | HEIGHT: 70 IN | BODY MASS INDEX: 30.64 KG/M2 | WEIGHT: 214 LBS | RESPIRATION RATE: 16 BRPM | SYSTOLIC BLOOD PRESSURE: 130 MMHG | DIASTOLIC BLOOD PRESSURE: 80 MMHG | OXYGEN SATURATION: 94 %

## 2021-03-22 DIAGNOSIS — G62.9 POLYNEUROPATHY: Primary | Chronic | ICD-10-CM

## 2021-03-22 DIAGNOSIS — G25.0 TREMOR, ESSENTIAL: Chronic | ICD-10-CM

## 2021-03-22 PROCEDURE — 99214 OFFICE O/P EST MOD 30 MIN: CPT | Performed by: PSYCHIATRY & NEUROLOGY

## 2021-03-22 NOTE — PROGRESS NOTES
"Chief Complaint  Follow-up (4 week)    Subjective          Cipriano Montes presents to [facility] for peripheral neuropathy.    History of Present Illness    81 y.o. male returns in follow up.  Last visit on 2/23/21 ordered labs.     Labs reviewed.    Previous right tibia/fibular fx three years ago.      Balance off with closing eyes in shower.  Numbness in right foot from mid foot to heel.  Left foot numbness 4 years ago.       Denies pain.        EMG/NCS - 1/4/21 distal symmetrical sensorimotor polyneuropathy with features of axonal loss.          Objective   Vital Signs:   /80   Pulse 81   Resp 16   Ht 177.8 cm (70\")   Wt 97.1 kg (214 lb)   SpO2 94%   BMI 30.71 kg/m²       Physical Exam  Eyes:      Extraocular Movements: EOM normal.      Pupils: Pupils are equal, round, and reactive to light.   Neurological:      Mental Status: He is oriented to person, place, and time.      Coordination: Romberg Test abnormal. Finger-Nose-Finger Test and Heel to Shin Test normal.      Gait: Tandem walk abnormal.      Deep Tendon Reflexes: Strength normal.      Reflex Scores:       Tricep reflexes are 1+ on the right side and 1+ on the left side.       Bicep reflexes are 1+ on the right side and 1+ on the left side.       Brachioradialis reflexes are 1+ on the right side and 1+ on the left side.       Patellar reflexes are 0 on the right side and 0 on the left side.       Achilles reflexes are 0 on the right side and 0 on the left side.  Psychiatric:         Speech: Speech normal.          Neurologic Exam     Mental Status   Oriented to person, place, and time.   Registration: recalls 3 of 3 objects. Recall at 5 minutes: recalls 3 of 3 objects. Follows 3 step commands.   Attention: normal. Concentration: normal.   Speech: speech is normal   Level of consciousness: alert  Knowledge: good and consistent with education.   Able to name object. Able to read. Able to repeat. Able to write. " Normal comprehension.     Cranial Nerves     CN II   Visual fields full to confrontation.   Visual acuity: normal  Right visual field deficit: none  Left visual field deficit: none     CN III, IV, VI   Pupils are equal, round, and reactive to light.  Extraocular motions are normal.   Nystagmus: none   Diplopia: none  Ophthalmoparesis: none  Upgaze: normal  Downgaze: normal  Conjugate gaze: present  Vestibulo-ocular reflex: present    CN V   Facial sensation intact.   Right corneal reflex: normal  Left corneal reflex: normal    CN VII   Right facial weakness: none  Left facial weakness: none    CN VIII   Hearing: intact    CN IX, X   Palate: symmetric  Right gag reflex: normal  Left gag reflex: normal    CN XI   Right sternocleidomastoid strength: normal  Left sternocleidomastoid strength: normal    CN XII   Tongue: not atrophic  Fasciculations: absent  Tongue deviation: none    Motor Exam   Muscle bulk: normal  Overall muscle tone: normal  Right arm tone: normal  Left arm tone: normal  Right leg tone: normal  Left leg tone: normal    Strength   Strength 5/5 throughout.     Sensory Exam   Right leg light touch: decreased from knee  Left leg light touch: decreased from knee  Right leg pinprick: decreased from knee  Left leg pinprick: decreased from knee    Gait, Coordination, and Reflexes     Gait  Gait: shuffling    Coordination   Romberg: positive  Finger to nose coordination: normal  Heel to shin coordination: normal  Tandem walking coordination: abnormal    Tremor   Resting tremor: absent  Intention tremor: absent  Action tremor: absent    Reflexes   Right brachioradialis: 1+  Left brachioradialis: 1+  Right biceps: 1+  Left biceps: 1+  Right triceps: 1+  Left triceps: 1+  Right patellar: 0  Left patellar: 0  Right achilles: 0  Left achilles: 0     Result Review :   The following data was reviewed by: Iain Heaton MD on 03/22/2021:  Common labs    Common Labsle 10/13/20 10/13/20 11/9/20 2/24/21 2/24/21  2/24/21    1107 1108  1639 1639 1639   Glucose  134 (A)    105 (A)   Glucose   88      BUN  17 17   19   Creatinine  1.24 1.16   1.03   eGFR Non African Am  56 (A) 61   70   eGFR African Am   74      Sodium  141 140   139   Potassium  4.4 4.4   4.1   Chloride  107 109 (A)   105   Calcium  9.5 9.2   8.9   Total Protein   6.3      Albumin  4.60 3.90   3.80   Total Bilirubin  0.5 0.4   0.3   Alkaline Phosphatase  137 (A) 114   153 (A)   AST (SGOT)  42 (A) 26   <5   ALT (SGPT)  78 (A) 48 (A)   43 (A)   WBC 8.65   6.95     Hemoglobin 17.1   16.1     Hematocrit 50.7   49.0     Platelets 229   198     Hemoglobin A1C     6.20 (A)    (A) Abnormal value       Comments are available for some flowsheets but are not being displayed.                     Assessment and Plan    Diagnoses and all orders for this visit:    1. Polyneuropathy (Primary)  Assessment & Plan:  Idiopathic neuropathy     Continue wt loss and B12 replacement       2. Tremor, essential  Assessment & Plan:  Sx stable         Follow Up   No follow-ups on file.  Patient was given instructions and counseling regarding his condition or for health maintenance advice. Please see specific information pulled into the AVS if appropriate.

## 2021-05-10 ENCOUNTER — OFFICE VISIT (OUTPATIENT)
Dept: INTERNAL MEDICINE | Facility: CLINIC | Age: 81
End: 2021-05-10

## 2021-05-10 VITALS
RESPIRATION RATE: 16 BRPM | HEART RATE: 80 BPM | WEIGHT: 216.4 LBS | TEMPERATURE: 98.2 F | HEIGHT: 70 IN | OXYGEN SATURATION: 96 % | SYSTOLIC BLOOD PRESSURE: 132 MMHG | BODY MASS INDEX: 30.98 KG/M2 | DIASTOLIC BLOOD PRESSURE: 84 MMHG

## 2021-05-10 DIAGNOSIS — J45.902 MODERATE ASTHMA WITH STATUS ASTHMATICUS, UNSPECIFIED WHETHER PERSISTENT: Primary | ICD-10-CM

## 2021-05-10 DIAGNOSIS — E11.9 TYPE 2 DIABETES MELLITUS WITHOUT COMPLICATION, WITHOUT LONG-TERM CURRENT USE OF INSULIN (HCC): ICD-10-CM

## 2021-05-10 PROCEDURE — 99214 OFFICE O/P EST MOD 30 MIN: CPT | Performed by: INTERNAL MEDICINE

## 2021-05-10 RX ORDER — ACETAMINOPHEN, GUAIFENESIN, AND PHENYLEPHRINE HYDROCHLORIDE 650; 400; 10 MG/20ML; MG/20ML; MG/20ML
SOLUTION ORAL DAILY PRN
COMMUNITY
End: 2022-05-11

## 2021-05-10 RX ORDER — LORATADINE 10 MG/1
1 CAPSULE, LIQUID FILLED ORAL DAILY
Status: ON HOLD | COMMUNITY
End: 2022-09-30

## 2021-05-10 NOTE — PROGRESS NOTES
Subjective     Patient ID: Cipriano Montes is a 81 y.o. male. Patient is here for management of multiple medical problems.     Chief Complaint   Patient presents with   • Follow-up     pt was last seen in January for bronchitis, he does c/o trouble breathing, states he is using his rescue inhaler almost daily. He is scheduled with pulmonology on the 14th.      History of Present Illness         Still soa. Has pulmonary visit in 4 days. Using rescue inh more.  Hx of asthma not well controlled.    Sig mold exposure in his 20's. Asthma ever since.          The following portions of the patient's history were reviewed and updated as appropriate: allergies, current medications, past family history, past medical history, past social history, past surgical history and problem list.    Review of Systems   Constitutional: Negative for diaphoresis and fatigue.   Psychiatric/Behavioral: Negative for hallucinations. The patient is not nervous/anxious and is not hyperactive.        Current Outpatient Medications:   •  acetaminophen (Tylenol 8 Hour) 650 MG 8 hr tablet, Every 6 (Six) Hours., Disp: , Rfl:   •  albuterol sulfate HFA (Ventolin HFA) 108 (90 Base) MCG/ACT inhaler, Inhale 1 puff Every 6 (Six) Hours As Needed for Wheezing., Disp: 1 g, Rfl: 3  •  cyanocobalamin 1000 MCG/ML injection, Inject 1,000 mcg into the appropriate muscle as directed by prescriber Every 28 (Twenty-Eight) Days., Disp: , Rfl:   •  ipratropium (ATROVENT) 0.06 % nasal spray, 2 sprays into the nostril(s) as directed by provider every night at bedtime., Disp: 15 mL, Rfl: 12  •  levothyroxine (SYNTHROID, LEVOTHROID) 100 MCG tablet, Take 1 tablet by mouth Daily., Disp: 90 tablet, Rfl: 3  •  Loratadine (Claritin) 10 MG capsule, Take 1 capsule by mouth Daily., Disp: , Rfl:   •  NIFEdipine XL (PROCARDIA XL) 60 MG 24 hr tablet, Take 1 tablet by mouth Daily., Disp: 90 tablet, Rfl: 3  •  Phenylephrine-APAP-guaiFENesin (Mucinex Sinus-Max Congestion) -400  "MG/20ML liquid, Take  by mouth Daily As Needed., Disp: , Rfl:   •  Probiotic Product (PROBIOTIC-10 PO), Probiotic  1 capsule daily, Disp: , Rfl:   •  Syringe/Needle, Disp, (B-D 3CC LUER-CAITLIN SYR 25GX1\") 25G X 1\" 3 ML misc, , Disp: , Rfl:   •  tamsulosin (FLOMAX) 0.4 MG capsule 24 hr capsule, Take 1 capsule by mouth Daily., Disp: 90 capsule, Rfl: 3  •  Fluticasone Furoate-Vilanterol (BREO ELLIPTA) 200-25 MCG/INH inhaler, Inhale 1 puff Daily., Disp: 30 each, Rfl: 11    Objective      Blood pressure 132/84, pulse 80, temperature 98.2 °F (36.8 °C), temperature source Temporal, resp. rate 16, height 177.8 cm (70\"), weight 98.2 kg (216 lb 6.4 oz), SpO2 96 %.    Physical Exam     General Appearance:    Alert, cooperative, no distress, appears stated age   Head:    Normocephalic, without obvious abnormality, atraumatic   Eyes:    PERRL, conjunctiva/corneas clear, EOM's intact   Ears:    Normal TM's and external ear canals, both ears   Nose:   Nares normal, septum midline, mucosa normal, no drainage   or sinus tenderness   Throat:   Lips, mucosa, and tongue normal; teeth and gums normal   Neck:   Supple, symmetrical, trachea midline, no adenopathy;        thyroid:  No enlargement/tenderness/nodules; no carotid    bruit or JVD   Back:     Symmetric, no curvature, ROM normal, no CVA tenderness   Lungs:     Wheezing on inspiration and expiration.    Clear to auscultation bilaterally, respirations unlabored   Chest wall:    No tenderness or deformity   Heart:    Regular rate and rhythm, S1 and S2 normal, no murmur,        rub or gallop   Abdomen:     Soft, non-tender, bowel sounds active all four quadrants,     no masses, no organomegaly   Extremities:   Extremities normal, atraumatic, no cyanosis or edema   Pulses:   2+ and symmetric all extremities   Skin:   Skin color, texture, turgor normal, no rashes or lesions   Lymph nodes:   Cervical, supraclavicular, and axillary nodes normal   Neurologic:   CNII-XII intact. Normal " strength, sensation and reflexes       throughout      Results for orders placed or performed in visit on 02/24/21   Sedimentation Rate    Specimen: Blood   Result Value Ref Range    Sed Rate 10 0 - 15 mm/hr   Celiac Disease Panel    Specimen: Blood   Result Value Ref Range    Endomysial IgA Negative Negative    Tissue Transglutaminase IgA <2 0 - 3 U/mL    IgA 347 61 - 437 mg/dL   CK    Specimen: Blood   Result Value Ref Range    Creatine Kinase 75 20 - 200 U/L   Comprehensive Metabolic Panel    Specimen: Blood   Result Value Ref Range    Glucose 105 (H) 65 - 99 mg/dL    BUN 19 8 - 23 mg/dL    Creatinine 1.03 0.76 - 1.27 mg/dL    Sodium 139 136 - 145 mmol/L    Potassium 4.1 3.5 - 5.2 mmol/L    Chloride 105 98 - 107 mmol/L    CO2 26.3 22.0 - 29.0 mmol/L    Calcium 8.9 8.6 - 10.5 mg/dL    Total Protein 6.3 6.0 - 8.5 g/dL    Albumin 3.80 3.50 - 5.20 g/dL    ALT (SGPT) 43 (H) 1 - 41 U/L    AST (SGOT) <5 1 - 40 U/L    Alkaline Phosphatase 153 (H) 39 - 117 U/L    Total Bilirubin 0.3 0.0 - 1.2 mg/dL    eGFR Non African Amer 70 >60 mL/min/1.73    Globulin 2.5 gm/dL    A/G Ratio 1.5 g/dL    BUN/Creatinine Ratio 18.4 7.0 - 25.0    Anion Gap 7.7 5.0 - 15.0 mmol/L   Cryoglobulin    Specimen: Blood   Result Value Ref Range    Cryoglobulin, Ql, Serum, Rflx Comment None detected   Hemoglobin A1c    Specimen: Blood   Result Value Ref Range    Hemoglobin A1C 6.20 (H) 4.80 - 5.60 %   TSH    Specimen: Blood   Result Value Ref Range    TSH 0.612 0.270 - 4.200 uIU/mL   Vitamin B12 & Folate    Specimen: Blood   Result Value Ref Range    Folate 12.50 4.78 - 24.20 ng/mL    Vitamin B-12 284 211 - 946 pg/mL   Immunofixation, Serum    Specimen: Blood   Result Value Ref Range    Immunofixation Result, Serum Comment     IgG 903 603 - 1613 mg/dL    IgA 340 61 - 437 mg/dL    IgM 131 15 - 143 mg/dL   DORA by IFA, Reflex 9-biomarkers profile    Specimen: Blood   Result Value Ref Range    DORA Positive (A)     Please note Comment    Angiotensin  Converting Enzyme    Specimen: Blood   Result Value Ref Range    Angiotensin Converting Enzyme 57 14 - 82 U/L   Rheumatoid Factor    Specimen: Blood   Result Value Ref Range    Rheumatoid Factor Quantitative <10.0 0.0 - 14.0 IU/mL   CBC Auto Differential    Specimen: Blood   Result Value Ref Range    WBC 6.95 3.40 - 10.80 10*3/mm3    RBC 5.60 4.14 - 5.80 10*6/mm3    Hemoglobin 16.1 13.0 - 17.7 g/dL    Hematocrit 49.0 37.5 - 51.0 %    MCV 87.5 79.0 - 97.0 fL    MCH 28.8 26.6 - 33.0 pg    MCHC 32.9 31.5 - 35.7 g/dL    RDW 13.6 12.3 - 15.4 %    RDW-SD 43.9 37.0 - 54.0 fl    MPV 9.2 6.0 - 12.0 fL    Platelets 198 140 - 450 10*3/mm3    Neutrophil % 59.6 42.7 - 76.0 %    Lymphocyte % 23.2 19.6 - 45.3 %    Monocyte % 11.5 5.0 - 12.0 %    Eosinophil % 4.7 0.3 - 6.2 %    Basophil % 0.9 0.0 - 1.5 %    Immature Grans % 0.1 0.0 - 0.5 %    Neutrophils, Absolute 4.14 1.70 - 7.00 10*3/mm3    Lymphocytes, Absolute 1.61 0.70 - 3.10 10*3/mm3    Monocytes, Absolute 0.80 0.10 - 0.90 10*3/mm3    Eosinophils, Absolute 0.33 0.00 - 0.40 10*3/mm3    Basophils, Absolute 0.06 0.00 - 0.20 10*3/mm3    Immature Grans, Absolute 0.01 0.00 - 0.05 10*3/mm3    nRBC 0.0 0.0 - 0.2 /100 WBC   JAMES+DNA/DS+Antich+Centro+FA..    Specimen: Blood   Result Value Ref Range    Homogeneous Pattern 1:160 (H)     Note: (Reference) Comment     Anti-DNA (DS) Ab Qn <1 0 - 9 IU/mL    RNP Antibodies <0.2 0.0 - 0.9 AI    Barry Antibodies <0.2 0.0 - 0.9 AI    Antiscleroderma-70 Antibodies <0.2 0.0 - 0.9 AI    JAMES SSA (RO) Ab <0.2 0.0 - 0.9 AI    JAMES SSB (LA) Ab <0.2 0.0 - 0.9 AI    Antichromatin Antibodies <0.2 0.0 - 0.9 AI    FAUSTO-1 IgG <0.2 0.0 - 0.9 AI    Anti-Centromere B Antibodies 0.2 0.0 - 0.9 AI    See below: Comment          Assessment/Plan       Pt using albuterol only.  Will try Breo. No meds in this class look to be covered.    Pt will need pulm w/u.         Diagnoses and all orders for this visit:    1. Moderate asthma with status asthmaticus, unspecified  whether persistent (Primary)  -     Fluticasone Furoate-Vilanterol (BREO ELLIPTA) 200-25 MCG/INH inhaler; Inhale 1 puff Daily.  Dispense: 30 each; Refill: 11  -     Vitamin B12  -     Comprehensive Metabolic Panel  -     TSH  -     T4, Free  -     CBC & Differential  -     Lipid Panel  -     Hemoglobin A1c  -     MicroAlbumin, Urine, Random - Urine, Clean Catch    2. Type 2 diabetes mellitus without complication, without long-term current use of insulin (CMS/[facility])  -     Vitamin B12  -     Comprehensive Metabolic Panel  -     TSH  -     T4, Free  -     CBC & Differential  -     Lipid Panel  -     Hemoglobin A1c  -     MicroAlbumin, Urine, Random - Urine, Clean Catch      Return in about 3 months (around 8/10/2021).          There are no Patient Instructions on file for this visit.     Ron Velazquez MD    Assessment/Plan

## 2021-05-14 ENCOUNTER — LAB (OUTPATIENT)
Dept: LAB | Facility: HOSPITAL | Age: 81
End: 2021-05-14

## 2021-05-14 ENCOUNTER — OFFICE VISIT (OUTPATIENT)
Dept: PULMONOLOGY | Facility: CLINIC | Age: 81
End: 2021-05-14

## 2021-05-14 VITALS
OXYGEN SATURATION: 98 % | RESPIRATION RATE: 16 BRPM | BODY MASS INDEX: 30.78 KG/M2 | WEIGHT: 215 LBS | HEART RATE: 62 BPM | HEIGHT: 70 IN | SYSTOLIC BLOOD PRESSURE: 120 MMHG | DIASTOLIC BLOOD PRESSURE: 82 MMHG

## 2021-05-14 DIAGNOSIS — J30.1 SEASONAL ALLERGIC RHINITIS DUE TO POLLEN: ICD-10-CM

## 2021-05-14 DIAGNOSIS — J45.40 MODERATE PERSISTENT ASTHMA WITHOUT COMPLICATION: ICD-10-CM

## 2021-05-14 DIAGNOSIS — J45.40 MODERATE PERSISTENT ASTHMA WITHOUT COMPLICATION: Primary | ICD-10-CM

## 2021-05-14 DIAGNOSIS — R05.9 COUGH: ICD-10-CM

## 2021-05-14 LAB
ALBUMIN SERPL-MCNC: 4 G/DL (ref 3.5–5.2)
ALBUMIN UR-MCNC: <1.2 MG/DL
ALBUMIN/GLOB SERPL: 1.3 G/DL
ALP SERPL-CCNC: 128 U/L (ref 39–117)
ALT SERPL W P-5'-P-CCNC: 48 U/L (ref 1–41)
ANION GAP SERPL CALCULATED.3IONS-SCNC: 9.9 MMOL/L (ref 5–15)
AST SERPL-CCNC: 27 U/L (ref 1–40)
BASOPHILS # BLD AUTO: 0.05 10*3/MM3 (ref 0–0.2)
BASOPHILS NFR BLD AUTO: 0.8 % (ref 0–1.5)
BILIRUB SERPL-MCNC: 0.3 MG/DL (ref 0–1.2)
BUN SERPL-MCNC: 22 MG/DL (ref 8–23)
BUN/CREAT SERPL: 17.9 (ref 7–25)
CALCIUM SPEC-SCNC: 9.1 MG/DL (ref 8.6–10.5)
CHLORIDE SERPL-SCNC: 107 MMOL/L (ref 98–107)
CHOLEST SERPL-MCNC: 172 MG/DL (ref 0–200)
CO2 SERPL-SCNC: 24.1 MMOL/L (ref 22–29)
CREAT SERPL-MCNC: 1.23 MG/DL (ref 0.76–1.27)
DEPRECATED RDW RBC AUTO: 43.2 FL (ref 37–54)
EOSINOPHIL # BLD AUTO: 0.16 10*3/MM3 (ref 0–0.4)
EOSINOPHIL NFR BLD AUTO: 2.4 % (ref 0.3–6.2)
ERYTHROCYTE [DISTWIDTH] IN BLOOD BY AUTOMATED COUNT: 13.8 % (ref 12.3–15.4)
GFR SERPL CREATININE-BSD FRML MDRD: 57 ML/MIN/1.73
GLOBULIN UR ELPH-MCNC: 3.1 GM/DL
GLUCOSE SERPL-MCNC: 99 MG/DL (ref 65–99)
HBA1C MFR BLD: 5.8 % (ref 4.8–5.6)
HCT VFR BLD AUTO: 47.5 % (ref 37.5–51)
HDLC SERPL-MCNC: 36 MG/DL (ref 40–60)
HGB BLD-MCNC: 15.9 G/DL (ref 13–17.7)
IMM GRANULOCYTES # BLD AUTO: 0.02 10*3/MM3 (ref 0–0.05)
IMM GRANULOCYTES NFR BLD AUTO: 0.3 % (ref 0–0.5)
LDLC SERPL CALC-MCNC: 101 MG/DL (ref 0–100)
LDLC/HDLC SERPL: 2.67 {RATIO}
LYMPHOCYTES # BLD AUTO: 1.64 10*3/MM3 (ref 0.7–3.1)
LYMPHOCYTES NFR BLD AUTO: 25 % (ref 19.6–45.3)
MCH RBC QN AUTO: 28.9 PG (ref 26.6–33)
MCHC RBC AUTO-ENTMCNC: 33.5 G/DL (ref 31.5–35.7)
MCV RBC AUTO: 86.2 FL (ref 79–97)
MONOCYTES # BLD AUTO: 0.66 10*3/MM3 (ref 0.1–0.9)
MONOCYTES NFR BLD AUTO: 10.1 % (ref 5–12)
NEUTROPHILS NFR BLD AUTO: 4.03 10*3/MM3 (ref 1.7–7)
NEUTROPHILS NFR BLD AUTO: 61.4 % (ref 42.7–76)
NRBC BLD AUTO-RTO: 0 /100 WBC (ref 0–0.2)
PLATELET # BLD AUTO: 210 10*3/MM3 (ref 140–450)
PMV BLD AUTO: 9.9 FL (ref 6–12)
POTASSIUM SERPL-SCNC: 4.2 MMOL/L (ref 3.5–5.2)
PROT SERPL-MCNC: 7.1 G/DL (ref 6–8.5)
RBC # BLD AUTO: 5.51 10*6/MM3 (ref 4.14–5.8)
SODIUM SERPL-SCNC: 141 MMOL/L (ref 136–145)
T4 FREE SERPL-MCNC: 1.16 NG/DL (ref 0.93–1.7)
TRIGL SERPL-MCNC: 199 MG/DL (ref 0–150)
TSH SERPL DL<=0.05 MIU/L-ACNC: 1.36 UIU/ML (ref 0.27–4.2)
VIT B12 BLD-MCNC: 368 PG/ML (ref 211–946)
VLDLC SERPL-MCNC: 35 MG/DL (ref 5–40)
WBC # BLD AUTO: 6.56 10*3/MM3 (ref 3.4–10.8)

## 2021-05-14 PROCEDURE — 86003 ALLG SPEC IGE CRUDE XTRC EA: CPT

## 2021-05-14 PROCEDURE — 80053 COMPREHEN METABOLIC PANEL: CPT | Performed by: INTERNAL MEDICINE

## 2021-05-14 PROCEDURE — 82043 UR ALBUMIN QUANTITATIVE: CPT | Performed by: INTERNAL MEDICINE

## 2021-05-14 PROCEDURE — 84439 ASSAY OF FREE THYROXINE: CPT | Performed by: INTERNAL MEDICINE

## 2021-05-14 PROCEDURE — 82785 ASSAY OF IGE: CPT

## 2021-05-14 PROCEDURE — 84443 ASSAY THYROID STIM HORMONE: CPT | Performed by: INTERNAL MEDICINE

## 2021-05-14 PROCEDURE — 36415 COLL VENOUS BLD VENIPUNCTURE: CPT | Performed by: INTERNAL MEDICINE

## 2021-05-14 PROCEDURE — 85025 COMPLETE CBC W/AUTO DIFF WBC: CPT | Performed by: INTERNAL MEDICINE

## 2021-05-14 PROCEDURE — 80061 LIPID PANEL: CPT | Performed by: INTERNAL MEDICINE

## 2021-05-14 PROCEDURE — 99204 OFFICE O/P NEW MOD 45 MIN: CPT | Performed by: INTERNAL MEDICINE

## 2021-05-14 PROCEDURE — 82607 VITAMIN B-12: CPT | Performed by: INTERNAL MEDICINE

## 2021-05-14 PROCEDURE — 83036 HEMOGLOBIN GLYCOSYLATED A1C: CPT | Performed by: INTERNAL MEDICINE

## 2021-05-14 NOTE — PROGRESS NOTES
New Pulmonary Patient Office Visit      Patient Name: Cipriano Montes    Referring Physician: Ron Velazquez MD    Chief Complaint:    Chief Complaint   Patient presents with   • Consult   • Breathing Problem       History of Present Illness: Cipriano Montes is a 81 y.o. male who is here today to establish care with Pulmonary.      Duration: Asthma diagnosed in his late 30s, had been doing well until he moved here from [place] in June 2020.  He has noticed significant increase in allergy symptoms since that time as well as increased cough and shortness of breath.  Severity: Moderate persistent based on symptoms  Associated Symptoms: Chronic cough that is mostly dry, chronic seasonal allergies with postnasal drip, no hemoptysis  timing: daily  Exacerbating Factors: exertion, allergy seasons, moved from [place]   relieving Factors: rest and inhalers    Patient had bronchitis back in January 2021 that improved with treatment.  Was seen by PCP and started on Breo 200 earlier this week and within 24 hours symptoms significantly improved.  Previous to that he was having nighttime awakening secondary to severe coughing fits, but this is all resolved.  Was previously requiring albuterol 2 times per day, but no longer requiring this with Breo.    He has worked most of his life as a .  However, has had some exposure to mold which he noticed does make symptoms worse.  Appears that he was initially diagnosed with asthma after finding significant amount of black mold in his home.      Subjective      Review of Systems:   Review of Systems   Constitutional: Negative for chills, fatigue, fever, unexpected weight gain and unexpected weight loss.   HENT: Positive for postnasal drip and rhinorrhea. Negative for sinus pressure, trouble swallowing and voice change.    Eyes: Negative for discharge and itching.   Respiratory: Positive for cough and shortness of breath. Negative for wheezing.     Cardiovascular: Negative for chest pain, palpitations and leg swelling.   Gastrointestinal: Negative for abdominal distention, constipation, diarrhea and GERD.   Endocrine: Negative for cold intolerance and heat intolerance.   Genitourinary: Negative for dysuria and hematuria.   Musculoskeletal: Negative for arthralgias and myalgias.   Skin: Negative for color change and rash.   Allergic/Immunologic: Negative for environmental allergies and food allergies.   Neurological: Negative for dizziness, speech difficulty, light-headedness and memory problem.   Hematological: Negative for adenopathy. Does not bruise/bleed easily.   Psychiatric/Behavioral: Negative for sleep disturbance and depressed mood. The patient is not nervous/anxious.        Past Medical History:   Past Medical History:   Diagnosis Date   • Allergies    • Anemia    • Arthritis    • Asthma    • Cataracts, bilateral    • Depression    • Hypertension    • Hypothyroidism    • Kidney stones    • Peripheral neuropathy    • Thyroid disease    • Thyroid disorder        Past Surgical History:   Past Surgical History:   Procedure Laterality Date   • COLON SURGERY     • TONSILLECTOMY     • TONSILLECTOMY         Family History:   Family History   Problem Relation Age of Onset   • Cancer Other    • Diabetes Other    • Prostate cancer Other    • Leukemia Mother    • Diabetes Father        Social History:   Social History     Socioeconomic History   • Marital status:      Spouse name: Not on file   • Number of children: Not on file   • Years of education: Not on file   • Highest education level: Not on file   Tobacco Use   • Smoking status: Never Smoker   • Smokeless tobacco: Never Used   Vaping Use   • Vaping Use: Never used   Substance and Sexual Activity   • Alcohol use: Yes     Comment: Rarely   • Drug use: No   • Sexual activity: Defer       Medications:     Current Outpatient Medications:   •  acetaminophen (Tylenol 8 Hour) 650 MG 8 hr tablet, Every 6  "(Six) Hours., Disp: , Rfl:   •  albuterol sulfate HFA (Ventolin HFA) 108 (90 Base) MCG/ACT inhaler, Inhale 1 puff Every 6 (Six) Hours As Needed for Wheezing., Disp: 1 g, Rfl: 3  •  cyanocobalamin 1000 MCG/ML injection, Inject 1,000 mcg into the appropriate muscle as directed by prescriber Every 28 (Twenty-Eight) Days., Disp: , Rfl:   •  ipratropium (ATROVENT) 0.06 % nasal spray, 2 sprays into the nostril(s) as directed by provider every night at bedtime., Disp: 15 mL, Rfl: 12  •  levothyroxine (SYNTHROID, LEVOTHROID) 100 MCG tablet, Take 1 tablet by mouth Daily., Disp: 90 tablet, Rfl: 3  •  NIFEdipine XL (PROCARDIA XL) 60 MG 24 hr tablet, Take 1 tablet by mouth Daily., Disp: 90 tablet, Rfl: 3  •  Phenylephrine-APAP-guaiFENesin (Mucinex Sinus-Max Congestion) -400 MG/20ML liquid, Take  by mouth Daily As Needed., Disp: , Rfl:   •  Probiotic Product (PROBIOTIC-10 PO), Probiotic  1 capsule daily, Disp: , Rfl:   •  Syringe/Needle, Disp, (B-D 3CC LUER-CAITLIN SYR 25GX1\") 25G X 1\" 3 ML misc, , Disp: , Rfl:   •  tamsulosin (FLOMAX) 0.4 MG capsule 24 hr capsule, Take 1 capsule by mouth Daily., Disp: 90 capsule, Rfl: 3  •  Fluticasone Furoate-Vilanterol (Breo Ellipta) 100-25 MCG/INH inhaler, Inhale 1 puff Daily., Disp: 3 each, Rfl: 3  •  Loratadine (Claritin) 10 MG capsule, Take 1 capsule by mouth Daily., Disp: , Rfl:     Allergies:   Allergies   Allergen Reactions   • Oxycodone-Acetaminophen GI Intolerance       Objective     Physical Exam:  Vital Signs:   Vitals:    05/14/21 1154   BP: 120/82   Pulse: 62   Resp: 16   SpO2: 98%   Weight: 97.5 kg (215 lb)   Height: 177.8 cm (70\")       Physical Exam  Constitutional:       General: He is not in acute distress.     Appearance: He is well-developed. He is not ill-appearing.   HENT:      Head: Normocephalic and atraumatic.      Right Ear: Tympanic membrane normal.      Left Ear: Tympanic membrane normal.      Nose: Rhinorrhea present. No congestion.      Mouth/Throat:      " Mouth: Mucous membranes are moist.      Pharynx: Oropharynx is clear. No oropharyngeal exudate or posterior oropharyngeal erythema.   Eyes:      General: No scleral icterus.        Right eye: No discharge.         Left eye: No discharge.      Extraocular Movements: Extraocular movements intact.   Neck:      Trachea: No tracheal deviation.   Cardiovascular:      Rate and Rhythm: Normal rate and regular rhythm.   Pulmonary:      Effort: No accessory muscle usage or respiratory distress.      Breath sounds: Normal breath sounds.   Abdominal:      General: There is no distension.      Palpations: Abdomen is soft.   Musculoskeletal:         General: Normal range of motion.      Cervical back: Normal range of motion and neck supple.      Right lower leg: No edema.      Left lower leg: No edema.   Skin:     General: Skin is warm and dry.      Findings: No rash.   Neurological:      General: No focal deficit present.      Mental Status: He is alert and oriented to person, place, and time.   Psychiatric:         Mood and Affect: Mood normal.         Behavior: Behavior normal.         Thought Content: Thought content normal.         Judgment: Judgment normal.         Results Review:   Labs: Reviewed.  Lab Results   Component Value Date    WBC 6.95 02/24/2021    HGB 16.1 02/24/2021    HCT 49.0 02/24/2021    MCV 87.5 02/24/2021     02/24/2021   Normal eosinophil level  Lab Results   Component Value Date    GLUCOSE 105 (H) 02/24/2021    CALCIUM 8.9 02/24/2021     02/24/2021    K 4.1 02/24/2021    CO2 26.3 02/24/2021     02/24/2021    BUN 19 02/24/2021    CREATININE 1.03 02/24/2021    EGFRIFAFRI 74 11/09/2020    EGFRIFNONA 70 02/24/2021    BCR 18.4 02/24/2021    ANIONGAP 7.7 02/24/2021     DORA positive with homogenous pattern 1: 160  Rheumatoid factor negative      Micro: As of May 14, 2021   No results found for: RESPCX  No results found for: BLOODCX  Lab Results   Component Value Date    URINECX Final report  (A) 07/27/2020     No results found for: MRSACX  No results found for: MRSAPCR  No results found for: URCX  No components found for: LOWRESPCF  No results found for: THROATCX  No results found for: CULTURES  No components found for: STREPBCX  No results found for: STREPPNEUAG  No results found for: LEGIONELLA  No results found for: MYCOPLASCX  No results found for: GCCX  No results found for: WOUNDCX  No results found for: BODYFLDCX    ABG:   Lab Results   Component Value Date    PHART 7.349 10/13/2020    VEY1AIJ 38.0 10/13/2020    PO2ART 84.0 10/13/2020    Z1MFCUNB 95.4 10/13/2020    CARBOXYHGB <0.6 10/13/2020       Echo:     Radiology Scans:   October 2020 chest x-ray was unremarkable.  No focal disease      PFT IMPRESSION:    None available for review    Assessment / Plan      Assessment/Plan:    1. Moderate persistent asthma without complication  Symptoms appear to be significantly improved with high-dose Breo.  I have advised him to go ahead and finish his current dose of high-dose Breo, but will decrease to lower dose of Breo.  Discussed risk, benefits and possible side effects of medication.  Advised to rinse his mouth out after each use which she has been doing.  No current side effects or issues with Breo.  Advised him that if his symptoms worsen with going to lower dose of Breo to let me know and we can always increase it back up to the higher dose of Breo.  Discussed that goals of care would be using the lowest amount of steroids possible to maintain symptom control.  Check PFTs prior to next clinic visit.  Check allergy testing.    - Regional Allergen Zone 8 / With IgE; Future  - Pulmonary Function Test; Future    2. Cough  Suspect it was related to inadequately treated asthma as well as worsening allergy symptoms since moving to the area.  Optimize treatment as detailed above    3. Seasonal allergic rhinitis due to pollen  Advised him consistently to use his Claritin and may even consider  over-the-counter nasal steroids if needed.  Check lab work.       Follow Up:   Return in about 6 months (around 11/14/2021).    Eugenie Shen MD  Pulmonary/Critical Care Physician   [facility]      Please note that portions of this note may have been completed with a voice recognition program. Efforts were made to edit the dictations, but occasionally words are mistranscribed.

## 2021-05-17 DIAGNOSIS — E11.9 TYPE 2 DIABETES MELLITUS WITHOUT COMPLICATION, WITHOUT LONG-TERM CURRENT USE OF INSULIN (HCC): Primary | ICD-10-CM

## 2021-05-17 DIAGNOSIS — K76.0 NAFLD (NONALCOHOLIC FATTY LIVER DISEASE): ICD-10-CM

## 2021-05-20 LAB
A ALTERNATA IGE QN: <0.1 KU/L
A FUMIGATUS IGE QN: <0.1 KU/L
AMER ROACH IGE QN: <0.1 KU/L
BAHIA GRASS IGE QN: <0.1 KU/L
BERMUDA GRASS IGE QN: <0.1 KU/L
BOXELDER IGE QN: <0.1 KU/L
C HERBARUM IGE QN: <0.1 KU/L
CAT DANDER IGE QN: <0.1 KU/L
CMN PIGWEED IGE QN: <0.1 KU/L
COMMON RAGWEED IGE QN: <0.1 KU/L
CONV CLASS DESCRIPTION: ABNORMAL
D FARINAE IGE QN: 3.55 KU/L
D PTERONYSS IGE QN: 3.08 KU/L
DOG DANDER IGE QN: 0.27 KU/L
ENGL PLANTAIN IGE QN: <0.1 KU/L
HAZELNUT POLN IGE QN: <0.1 KU/L
IGE SERPL-ACNC: 127 IU/ML (ref 6–495)
JOHNSON GRASS IGE QN: <0.1 KU/L
KENT BLUE GRASS IGE QN: <0.1 KU/L
LONDON PLANE IGE QN: <0.1 KU/L
M RACEMOSUS IGE QN: <0.1 KU/L
MT JUNIPER IGE QN: <0.1 KU/L
MUGWORT IGE QN: <0.1 KU/L
NETTLE IGE QN: <0.1 KU/L
P NOTATUM IGE QN: <0.1 KU/L
S BOTRYOSUM IGE QN: <0.1 KU/L
SHEEP SORREL IGE QN: <0.1 KU/L
SWEET GUM IGE QN: <0.1 KU/L
WHITE ELM IGE QN: <0.1 KU/L
WHITE HICKORY IGE QN: <0.1 KU/L
WHITE MULBERRY IGE QN: <0.1 KU/L
WHITE OAK IGE QN: <0.1 KU/L

## 2021-06-22 ENCOUNTER — TELEPHONE (OUTPATIENT)
Dept: INTERNAL MEDICINE | Facility: CLINIC | Age: 81
End: 2021-06-22

## 2021-06-22 NOTE — TELEPHONE ENCOUNTER
Caller: Cipriano Montes    Relationship: Self    Best call back number: [phone]    What is the best time to reach you: ANYTIME     Who are you requesting to speak with (clinical staff, provider,  specific staff member): CLINICAL STAFF     What was the call regarding: PATIENT STATES THAT HE HAS A CATARACT SURGERY COMING UP AND HE HAS BEEN READING THAT HE SHOULD NOT BE ON TAMSULOSIN WHILE HE HAS IT. HE IS WANTING TO SWITCH TO ANOTHER MEDICATION THAT IS SAFER.     Do you require a callback: YES

## 2021-06-29 NOTE — TELEPHONE ENCOUNTER
Spoke with patient states he did stop the medication for a few days and he is unable to empty his bladder. Requesting alternate medication.

## 2021-06-29 NOTE — TELEPHONE ENCOUNTER
Spoke with patient he is on the Tamsulosin. States he has an appointment with ophthalmology 7/8/21 and must be off the medication about a 20 day span for cataract surgery and just wants to know if there is something he can take for that period of time and then go back on Tamsulosin. Does not think he will be able to get in with urology and get new med in time for the surgery.

## 2021-06-30 DIAGNOSIS — Z12.5 PROSTATE CANCER SCREENING: Primary | ICD-10-CM

## 2021-06-30 RX ORDER — FINASTERIDE 5 MG/1
5 TABLET, FILM COATED ORAL DAILY
Qty: 90 TABLET | Refills: 3 | Status: SHIPPED | OUTPATIENT
Start: 2021-06-30 | End: 2021-10-21

## 2021-06-30 NOTE — TELEPHONE ENCOUNTER
Patient informed of instructions, verbalized understanding. States he will come n this afternoon for labs.

## 2021-07-01 LAB — PSA SERPL-MCNC: 1.55 NG/ML (ref 0–4)

## 2021-07-06 ENCOUNTER — TELEPHONE (OUTPATIENT)
Dept: INTERNAL MEDICINE | Facility: CLINIC | Age: 81
End: 2021-07-06

## 2021-07-06 NOTE — TELEPHONE ENCOUNTER
Caller: Cipriano Montes    Relationship: Self    Best call back number:     [phone]    What medications are you currently taking:     tamsulosin (FLOMAX) 0.4 MG capsule 24 hr capsule    finasteride (Proscar) 5 MG tablet    PATIENT IS PREPARING FOR CATARACT SURGERY    PATIENT'S QUESTION IS WHEN TO BEGIN TAKING BOTH MEDICATIONS AND FOR HOW LONG SHOULD HE TAKE BOTH THE TAMSULOSIN AND FINASTERIDE AT THE SAME TIME     PATIENT ASKED WHEN WOULD HE STOP TAKING THE TAMSULOSIN MEDICATION PRIOR TO HIS CATARACT SURGERY    PATIENT STATED HE HAS AN APPOINTMENT Thursday, 7/8/21TO FIND OUT THE DATE OF HIS CATARACT SURGERY    PATIENT STATED MOST LIKELY THERE WILL BE CATARACT SURGERY ON ONE EYE AT A TIME WITH APPROXIMATELY (10) DAYS IN BETWEEN SURGERIES    When did you start taking these medications:     N/A    Which medication are you concerned about:    SEE ABOVE    Who prescribed you this medication:     DR HARDY    What are your concerns:     SEE ABOVE    How long have you been taking these medications:     N/A    How long have you had these concerns:     N/A    PLEASE CONTACT PATIENT AT TELEPHONE NUMBER INCLUDED ABOVE WITH QUESTIONS AND/OR ANSWERS TO HIS QUESTIONS    DR ANTONY MD

## 2021-07-07 ENCOUNTER — TELEPHONE (OUTPATIENT)
Dept: INTERNAL MEDICINE | Facility: CLINIC | Age: 81
End: 2021-07-07

## 2021-07-27 ENCOUNTER — TELEPHONE (OUTPATIENT)
Dept: INTERNAL MEDICINE | Facility: CLINIC | Age: 81
End: 2021-07-27

## 2021-07-27 NOTE — TELEPHONE ENCOUNTER
Provider: ANTONY     Caller: VINAY HULL     Phone Number: [phone]    Reason for Call: PATIENT IS SCHEDULED 9/13/21 AND ASKED IF HE NEEDS TO DO LABS FOR THAT APPOINTMENT

## 2021-07-27 NOTE — TELEPHONE ENCOUNTER
Contacted patient and notified that fasting labs were active in chart and he could have completed at earliest convenience; patient expressed understanding and stated he would follow up as scheduled

## 2021-08-02 NOTE — TELEPHONE ENCOUNTER
Assessment/Plan:       Diagnoses and all orders for this visit:    Physical exam, annual  24-year-old female presenting for a physical exam     Continuation of healthy lifestyle encouraged  Continue regular follow-up with Gynecology  Continue regular follow-up with Nephrology  Recent labs reviewed  No additional labs at this time  Patient is doing well overall  Follow-up in one year or sooner if needed  Subjective:      Patient ID: Luís Bella is a 31 y o  female  Patient presents for a physical exam     Tries to eat healthy and stay physically active  No tobacco use  Occasional alcohol use  No drug use  Does have a gynecologist     Followed by Nephrology for hypertension  Did receive her COVID vaccine  Recently had labs  No acute concerns  The following portions of the patient's history were reviewed and updated as appropriate: She  has a past medical history of Acute renal failure ([facility] [address] ), Acute tubular necrosis ([facility] [address] ), Anemia, Bowel obstruction ([facility] [address] ) (2017), Chronic kidney disease, Fibroadenoma of breast, left, Fibroadenoma of breast, right,  1 para 1, History of transfusion (2015), Hypertension, Kidney stone (), Postpartum hemorrhage, Urinary tract infection, Vacuum extractor delivery, delivered, and Varicella  She   Patient Active Problem List    Diagnosis Date Noted    Essential hypertension 2021    History of DIC syndrome 2020    History of small bowel obstruction 2020    Elevated rheumatoid factor 2017    Acne 2017    Hernia, hiatal 2014    Migraine 2014     She  has a past surgical history that includes Exploratory laparotomy w/ bowel resection (N/A, 2017); Breast fibroadenoma surgery (); LAPAROTOMY (); Intrauterine device insertion; Breast lumpectomy (Left); Breast lumpectomy (Right); Tooth extraction; and pr  delivery only (N/A, 2020)    Her family history LOV was 01/08/2021   includes Colon cancer in her maternal grandmother; Diabetes in her paternal grandfather; Diverticulitis in her maternal grandfather, maternal grandmother, and mother; Heart disease in her maternal grandfather; Hepatitis in her father; Hyperlipidemia in her mother; Hypertension in her maternal grandfather and mother; Hypothyroidism in her mother; No Known Problems in her brother, daughter, and sister; Stroke in her maternal grandfather  She  reports that she has never smoked  She has never used smokeless tobacco  She reports current alcohol use of about 5 0 standard drinks of alcohol per week  She reports that she does not use drugs  Current Outpatient Medications   Medication Sig Dispense Refill    Docusate Sodium (COLACE PO) Take by mouth      NIFEdipine (PROCARDIA XL) 30 mg 24 hr tablet Take 1 tablet (30 mg total) by mouth daily 30 tablet 5    Prenatal MV-Min-Fe Fum-FA-DHA (PRENATAL 1 PO) Take by mouth      spironolactone (ALDACTONE) 50 mg tablet Take 50 mg by mouth 2 (two) times a day       No current facility-administered medications for this visit  Current Outpatient Medications on File Prior to Visit   Medication Sig    Docusate Sodium (COLACE PO) Take by mouth    NIFEdipine (PROCARDIA XL) 30 mg 24 hr tablet Take 1 tablet (30 mg total) by mouth daily    Prenatal MV-Min-Fe Fum-FA-DHA (PRENATAL 1 PO) Take by mouth    spironolactone (ALDACTONE) 50 mg tablet Take 50 mg by mouth 2 (two) times a day    [DISCONTINUED] acetaminophen (TYLENOL) 325 mg tablet Take 3 tablets (975 mg total) by mouth every 6 (six) hours (Patient not taking: Reported on 4/7/2021)    [DISCONTINUED] Lecithin 1200 MG CAPS Take by mouth daily     No current facility-administered medications on file prior to visit  She is allergic to nickel       Review of Systems   Constitutional: Negative for fever  Respiratory: Negative for shortness of breath  Cardiovascular: Negative for chest pain     Gastrointestinal: Negative for abdominal pain and blood in stool  Genitourinary: Negative for dysuria and hematuria  Musculoskeletal: Negative for arthralgias and myalgias  Skin: Negative for rash  Neurological: Negative for headaches  Objective:      /68 (BP Location: Left arm, Patient Position: Sitting, Cuff Size: Adult)   Pulse 67   Temp 98 4 °F (36 9 °C)   Ht 5' 3" (1 6 m)   Wt 59 4 kg (131 lb)   SpO2 99%   BMI 23 21 kg/m²          Physical Exam  Constitutional:       General: She is not in acute distress  Appearance: She is not ill-appearing  Comments: No nose, mouth or throat complaints  Nose, mouth and throat not examined  Mask in place  COVID-19 pandemic  HENT:      Head: Normocephalic and atraumatic  Right Ear: Tympanic membrane, ear canal and external ear normal       Left Ear: Tympanic membrane, ear canal and external ear normal    Eyes:      General: Lids are normal       Conjunctiva/sclera: Conjunctivae normal       Pupils: Pupils are equal, round, and reactive to light  Neck:      Trachea: Trachea normal    Cardiovascular:      Rate and Rhythm: Normal rate and regular rhythm  Heart sounds: S1 normal and S2 normal  No murmur heard  Pulmonary:      Effort: Pulmonary effort is normal       Breath sounds: Normal breath sounds  Abdominal:      General: Bowel sounds are normal       Palpations: Abdomen is soft  Tenderness: There is no abdominal tenderness  Musculoskeletal:         General: Normal range of motion  Skin:     General: Skin is warm and dry  Neurological:      Mental Status: She is alert and oriented to person, place, and time  Psychiatric:         Speech: Speech normal          Behavior: Behavior normal          Thought Content:  Thought content normal

## 2021-09-07 ENCOUNTER — LAB (OUTPATIENT)
Dept: LAB | Facility: HOSPITAL | Age: 81
End: 2021-09-07

## 2021-09-07 LAB
ALBUMIN SERPL-MCNC: 3.9 G/DL (ref 3.5–5.2)
ALBUMIN/GLOB SERPL: 1.3 G/DL
ALP SERPL-CCNC: 122 U/L (ref 39–117)
ALT SERPL W P-5'-P-CCNC: 58 U/L (ref 1–41)
ANION GAP SERPL CALCULATED.3IONS-SCNC: 9.8 MMOL/L (ref 5–15)
AST SERPL-CCNC: 31 U/L (ref 1–40)
BASOPHILS # BLD AUTO: 0.05 10*3/MM3 (ref 0–0.2)
BASOPHILS NFR BLD AUTO: 0.7 % (ref 0–1.5)
BILIRUB SERPL-MCNC: 0.4 MG/DL (ref 0–1.2)
BUN SERPL-MCNC: 15 MG/DL (ref 8–23)
BUN/CREAT SERPL: 13.2 (ref 7–25)
CALCIUM SPEC-SCNC: 9.2 MG/DL (ref 8.6–10.5)
CHLORIDE SERPL-SCNC: 107 MMOL/L (ref 98–107)
CHOLEST SERPL-MCNC: 174 MG/DL (ref 0–200)
CO2 SERPL-SCNC: 25.2 MMOL/L (ref 22–29)
CREAT SERPL-MCNC: 1.14 MG/DL (ref 0.76–1.27)
DEPRECATED RDW RBC AUTO: 45.2 FL (ref 37–54)
EOSINOPHIL # BLD AUTO: 0.3 10*3/MM3 (ref 0–0.4)
EOSINOPHIL NFR BLD AUTO: 4.1 % (ref 0.3–6.2)
ERYTHROCYTE [DISTWIDTH] IN BLOOD BY AUTOMATED COUNT: 14.4 % (ref 12.3–15.4)
GFR SERPL CREATININE-BSD FRML MDRD: 62 ML/MIN/1.73
GLOBULIN UR ELPH-MCNC: 2.9 GM/DL
GLUCOSE SERPL-MCNC: 127 MG/DL (ref 65–99)
HBA1C MFR BLD: 6 % (ref 4.8–5.6)
HCT VFR BLD AUTO: 48.7 % (ref 37.5–51)
HDLC SERPL-MCNC: 39 MG/DL (ref 40–60)
HGB BLD-MCNC: 16 G/DL (ref 13–17.7)
IMM GRANULOCYTES # BLD AUTO: 0.02 10*3/MM3 (ref 0–0.05)
IMM GRANULOCYTES NFR BLD AUTO: 0.3 % (ref 0–0.5)
LDLC SERPL CALC-MCNC: 112 MG/DL (ref 0–100)
LDLC/HDLC SERPL: 2.82 {RATIO}
LYMPHOCYTES # BLD AUTO: 1.76 10*3/MM3 (ref 0.7–3.1)
LYMPHOCYTES NFR BLD AUTO: 24.1 % (ref 19.6–45.3)
MCH RBC QN AUTO: 28.4 PG (ref 26.6–33)
MCHC RBC AUTO-ENTMCNC: 32.9 G/DL (ref 31.5–35.7)
MCV RBC AUTO: 86.5 FL (ref 79–97)
MONOCYTES # BLD AUTO: 0.59 10*3/MM3 (ref 0.1–0.9)
MONOCYTES NFR BLD AUTO: 8.1 % (ref 5–12)
NEUTROPHILS NFR BLD AUTO: 4.58 10*3/MM3 (ref 1.7–7)
NEUTROPHILS NFR BLD AUTO: 62.7 % (ref 42.7–76)
NRBC BLD AUTO-RTO: 0 /100 WBC (ref 0–0.2)
PLATELET # BLD AUTO: 211 10*3/MM3 (ref 140–450)
PMV BLD AUTO: 9.8 FL (ref 6–12)
POTASSIUM SERPL-SCNC: 4.6 MMOL/L (ref 3.5–5.2)
PROT SERPL-MCNC: 6.8 G/DL (ref 6–8.5)
RBC # BLD AUTO: 5.63 10*6/MM3 (ref 4.14–5.8)
SODIUM SERPL-SCNC: 142 MMOL/L (ref 136–145)
T4 FREE SERPL-MCNC: 1.31 NG/DL (ref 0.93–1.7)
TRIGL SERPL-MCNC: 126 MG/DL (ref 0–150)
TSH SERPL DL<=0.05 MIU/L-ACNC: 1.71 UIU/ML (ref 0.27–4.2)
VLDLC SERPL-MCNC: 23 MG/DL (ref 5–40)
WBC # BLD AUTO: 7.3 10*3/MM3 (ref 3.4–10.8)

## 2021-09-07 PROCEDURE — 85025 COMPLETE CBC W/AUTO DIFF WBC: CPT | Performed by: INTERNAL MEDICINE

## 2021-09-07 PROCEDURE — 84443 ASSAY THYROID STIM HORMONE: CPT | Performed by: INTERNAL MEDICINE

## 2021-09-07 PROCEDURE — 80053 COMPREHEN METABOLIC PANEL: CPT | Performed by: INTERNAL MEDICINE

## 2021-09-07 PROCEDURE — 36415 COLL VENOUS BLD VENIPUNCTURE: CPT | Performed by: INTERNAL MEDICINE

## 2021-09-07 PROCEDURE — 83036 HEMOGLOBIN GLYCOSYLATED A1C: CPT | Performed by: INTERNAL MEDICINE

## 2021-09-07 PROCEDURE — 80061 LIPID PANEL: CPT | Performed by: INTERNAL MEDICINE

## 2021-09-07 PROCEDURE — 84439 ASSAY OF FREE THYROXINE: CPT | Performed by: INTERNAL MEDICINE

## 2021-09-09 ENCOUNTER — TELEPHONE (OUTPATIENT)
Dept: INTERNAL MEDICINE | Facility: CLINIC | Age: 81
End: 2021-09-09

## 2021-09-09 NOTE — TELEPHONE ENCOUNTER
Caller: Cipriano Montes    Relationship: Self    Best call back number: [phone] (H)    Medication needed:   cyanocobalamin 1000 MCG/ML injection  1,000 mcg, Every 28 Days          Summary: Inject 1,000 mcg into the appropriate muscle as directed by prescriber Every 28 (Twenty-Eight) Days.   Dose, Route, Frequency: 1,000 mcg, Intramuscular, Every 28 Days          When do you need the refill by: 9/24/21    What additional details did the patient provide when requesting the medication:   PATIENT STATES THAT HE WILL NEED THIS MEDICATION BEFORE THE END OF THE MONTH AND DO NOT HAVE ANY ON HAND    Does the patient have less than a 3 day supply:  [] Yes  [x] No    What is the patient's preferred pharmacy:    [id number] [place], [place] - [facility] [address] AT [facility] [address] - [phone]  - [phone] FX    PATIENT HAS BEEN ADVISED TO PLEASE ALLOW 48 HOURS FOR OUR CLINICAL TEAM WILL FOLLOW UP ON THIS REQUEST.

## 2021-09-10 NOTE — TELEPHONE ENCOUNTER
Lmtcb trying to see why pt needs the injectable and see if he can take the tablet.    OK FOR HUB TO DELIVER.

## 2021-09-10 NOTE — TELEPHONE ENCOUNTER
PT STATED HE HAD BEEN TAKING THE INJECTION FOR A LONG TIME AND THAT COMPARED TO THE TABLET HE FOUND IT TO BE MORE EFFECTIVE

## 2021-09-14 NOTE — TELEPHONE ENCOUNTER
Called, spoke to Cipriano he is aware to switch to methyl B12 1000 mg once day due to it being safer. He expressed understanding.

## 2021-10-13 DIAGNOSIS — E03.9 ACQUIRED HYPOTHYROIDISM: ICD-10-CM

## 2021-10-14 RX ORDER — TAMSULOSIN HYDROCHLORIDE 0.4 MG/1
1 CAPSULE ORAL DAILY
Qty: 90 CAPSULE | Refills: 3 | Status: SHIPPED | OUTPATIENT
Start: 2021-10-14 | End: 2022-04-13

## 2021-10-14 RX ORDER — LEVOTHYROXINE SODIUM 0.1 MG/1
100 TABLET ORAL DAILY
Qty: 90 TABLET | Refills: 3 | Status: SHIPPED | OUTPATIENT
Start: 2021-10-14 | End: 2022-04-13

## 2021-10-14 NOTE — TELEPHONE ENCOUNTER
Rx Refill Note  Requested Prescriptions     Pending Prescriptions Disp Refills   • levothyroxine (SYNTHROID, LEVOTHROID) 100 MCG tablet [Pharmacy Med Name: LEVOTHYROXINE 0.100MG (100MCG) TAB] 90 tablet 3     Sig: TAKE 1 TABLET BY MOUTH DAILY   • tamsulosin (FLOMAX) 0.4 MG capsule 24 hr capsule [Pharmacy Med Name: TAMSULOSIN 0.4MG CAPSULES] 90 capsule 3     Sig: TAKE 1 CAPSULE BY MOUTH DAILY      Last office visit with prescribing clinician: 5/10/2021      Next office visit with prescribing clinician: 10/21/2021            Carissa Dodge LPN  10/14/21, 14:37 EDT

## 2021-10-21 ENCOUNTER — OFFICE VISIT (OUTPATIENT)
Dept: INTERNAL MEDICINE | Facility: CLINIC | Age: 81
End: 2021-10-21

## 2021-10-21 VITALS
BODY MASS INDEX: 30.06 KG/M2 | RESPIRATION RATE: 16 BRPM | HEIGHT: 70 IN | SYSTOLIC BLOOD PRESSURE: 138 MMHG | OXYGEN SATURATION: 98 % | DIASTOLIC BLOOD PRESSURE: 84 MMHG | WEIGHT: 210 LBS | HEART RATE: 75 BPM | TEMPERATURE: 97.8 F

## 2021-10-21 DIAGNOSIS — E53.8 VITAMIN B12 DEFICIENCY: ICD-10-CM

## 2021-10-21 DIAGNOSIS — R53.83 FATIGUE, UNSPECIFIED TYPE: ICD-10-CM

## 2021-10-21 DIAGNOSIS — Z23 NEED FOR INFLUENZA VACCINATION: Primary | ICD-10-CM

## 2021-10-21 DIAGNOSIS — E53.1 VITAMIN B6 DEFICIENCY: ICD-10-CM

## 2021-10-21 DIAGNOSIS — G47.33 OSA (OBSTRUCTIVE SLEEP APNEA): ICD-10-CM

## 2021-10-21 PROCEDURE — 99214 OFFICE O/P EST MOD 30 MIN: CPT | Performed by: INTERNAL MEDICINE

## 2021-10-21 PROCEDURE — 90662 IIV NO PRSV INCREASED AG IM: CPT | Performed by: INTERNAL MEDICINE

## 2021-10-21 PROCEDURE — G0008 ADMIN INFLUENZA VIRUS VAC: HCPCS | Performed by: INTERNAL MEDICINE

## 2021-10-21 NOTE — PROGRESS NOTES
Subjective     Patient ID: Cipriano Montes is a 81 y.o. male. Patient is here for management of multiple medical problems.     Chief Complaint   Patient presents with   • Diabetes   • Asthma     History of Present Illness       DM    Fatigue. Lack of energy. Waking at 4 am.   Poor sleep.   Wakes tired.    Taking naps durring the day and reports sleeping to much.  Not able to play golf.  Feels week and has dificultly standing for a long time.  Neurology. Tell him major nerve damage.          The following portions of the patient's history were reviewed and updated as appropriate: allergies, current medications, past family history, past medical history, past social history, past surgical history and problem list.    Review of Systems   Constitutional: Negative for fatigue.   Respiratory: Negative for shortness of breath.    Psychiatric/Behavioral: Negative for sleep disturbance. The patient is not nervous/anxious.    All other systems reviewed and are negative.      Current Outpatient Medications:   •  acetaminophen (Tylenol 8 Hour) 650 MG 8 hr tablet, Every 6 (Six) Hours., Disp: , Rfl:   •  cyanocobalamin 1000 MCG/ML injection, Inject 1,000 mcg into the appropriate muscle as directed by prescriber Every 28 (Twenty-Eight) Days., Disp: , Rfl:   •  Fluticasone Furoate-Vilanterol (Breo Ellipta) 100-25 MCG/INH inhaler, Inhale 1 puff Daily., Disp: 3 each, Rfl: 3  •  ipratropium (ATROVENT) 0.06 % nasal spray, 2 sprays into the nostril(s) as directed by provider every night at bedtime., Disp: 15 mL, Rfl: 12  •  levothyroxine (SYNTHROID, LEVOTHROID) 100 MCG tablet, TAKE 1 TABLET BY MOUTH DAILY, Disp: 90 tablet, Rfl: 3  •  Loratadine (Claritin) 10 MG capsule, Take 1 capsule by mouth Daily., Disp: , Rfl:   •  NIFEdipine XL (PROCARDIA XL) 60 MG 24 hr tablet, Take 1 tablet by mouth Daily., Disp: 90 tablet, Rfl: 3  •  Phenylephrine-APAP-guaiFENesin (Mucinex Sinus-Max Congestion) -400 MG/20ML liquid, Take  by mouth Daily  "As Needed., Disp: , Rfl:   •  Probiotic Product (PROBIOTIC-10 PO), Probiotic  1 capsule daily, Disp: , Rfl:   •  Syringe/Needle, Disp, (B-D 3CC LUER-CAITLIN SYR 25GX1\") 25G X 1\" 3 ML misc, , Disp: , Rfl:   •  tamsulosin (FLOMAX) 0.4 MG capsule 24 hr capsule, TAKE 1 CAPSULE BY MOUTH DAILY, Disp: 90 capsule, Rfl: 3    Objective      Blood pressure 138/84, pulse 75, temperature 97.8 °F (36.6 °C), resp. rate 16, height 177.8 cm (70\"), weight 95.3 kg (210 lb), SpO2 98 %.    Physical Exam     General Appearance:    Alert, cooperative, no distress, appears stated age   Head:    Normocephalic, without obvious abnormality, atraumatic   Eyes:    PERRL, conjunctiva/corneas clear, EOM's intact   Ears:    Normal TM's and external ear canals, both ears   Nose:   Nares normal, septum midline, mucosa normal, no drainage   or sinus tenderness   Throat:   Lips, mucosa, and tongue normal; teeth and gums normal   Neck:   Supple, symmetrical, trachea midline, no adenopathy;        thyroid:  No enlargement/tenderness/nodules; no carotid    bruit or JVD   Back:     Symmetric, no curvature, ROM normal, no CVA tenderness   Lungs:     Clear to auscultation bilaterally, respirations unlabored   Chest wall:    No tenderness or deformity   Heart:    Regular rate and rhythm, S1 and S2 normal, no murmur,        rub or gallop   Abdomen:     Soft, non-tender, bowel sounds active all four quadrants,     no masses, no organomegaly   Extremities:   Extremities normal, atraumatic, no cyanosis or edema   Pulses:   2+ and symmetric all extremities   Skin:   Skin color, texture, turgor normal, no rashes or lesions   Lymph nodes:   Cervical, supraclavicular, and axillary nodes normal   Neurologic:   CNII-XII intact. Normal strength, sensation and reflexes       throughout      Results for orders placed or performed in visit on 10/21/21   Vitamin B12    Specimen: Blood   Result Value Ref Range    Vitamin B-12 497 211 - 946 pg/mL   Vitamin B6    Specimen: Blood "   Result Value Ref Range    Vitamin B6     Glia(IgA / G) & TTG(IgA / G)    Specimen: Blood   Result Value Ref Range    Gliadin Deamidated Peptide Ab, IgA 6 0 - 19 units    Deaminated Gliadin Ab IgG 2 0 - 19 units    Tissue Transglutaminase IgA <2 0 - 3 U/mL    Tissue Transglutaminase IgG 4 0 - 5 U/mL   Endomysial Antibody, IgA / IGG Titer    Specimen: Blood   Result Value Ref Range    Endomysial Antibody Titer IgA Comment Titer    Endomysial Antibody Titer IgG     Helicobacter Pylori, IgA IgG IgM    Specimen: Blood   Result Value Ref Range    H. pylori IgG 0.21 0.00 - 0.79 Index Value    H. pylori, IgA ABS <9.0 0.0 - 8.9 units    H. Pylori, IgM <9.0 0.0 - 8.9 units   [place] Spotted Fever, IgM    Specimen: Blood   Result Value Ref Range    RMSF IgM 1.72 (H) 0.00 - 0.89 index   [facility] Spotted Fever, IgG    Specimen: Blood   Result Value Ref Range    RMSF IgG Negative Negative   Ehrlichia Antibody Panel    Specimen: Blood   Result Value Ref Range    E. chaffeensis (HME) IgG Titer Negative Neg:<1:64    E. chaffeensis ([facility]) IgM Titer Negative Neg:<1:20    HGE IgG Titer Negative Neg:<1:64    HGE IgM Titer Negative Neg:<1:20   Lyme Disease, PCR    Specimen: Lumbar Puncture; Cerebrospinal Fluid   Result Value Ref Range    Lyme Disease(B.burgdorferi)PCR           Assessment/Plan   + RMSF in 10/2020 ab igm.   Pt on oral b12 now.  Will recheck levels.   On bcomplex with c.        Diagnoses and all orders for this visit:    1. Need for influenza vaccination (Primary)  -     Fluzone High-Dose 65+yrs (2613-1904)  -     Vitamin B12  -     Vitamin B6  -     Glia(IgA / G) & TTG(IgA / G)  -     Endomysial Antibody, IgA / IGG Titer  -     Helicobacter Pylori, IgA IgG IgM  -     [place] Spotted Fever, IgM  -     [facility] Spotted Fever, IgG  -     E[place]lich[place] Antibody Panel  -     Lyme Disease, PCR    2. Fatigue, unspecified type  -     Vitamin B12  -     Vitamin B6  -     Glia(IgA / G) & TTG(IgA / G)  -     Endomysial  Antibody, IgA / IGG Titer  -     Helicobacter Pylori, IgA IgG IgM  -     [place] Spotted Fever, IgM  -     [place] Spotted Fever, IgG  -     E[place]lichia Antibody Panel  -     Lyme Disease, PCR    3. Vitamin B12 deficiency  -     Vitamin B12  -     Vitamin B6  -     Glia(IgA / G) & TTG(IgA / G)  -     Endomysial Antibody, IgA / IGG Titer  -     Helicobacter Pylori, IgA IgG IgM  -     [place] Spotted Fever, IgM  -     [place] Spotted Fever, IgG  -     [place]lich[place] Antibody Panel  -     Lyme Disease, PCR    4. Vitamin B6 deficiency  -     Vitamin B6    5. KATHY (obstructive sleep apnea)  -     Ambulatory Referral to Neurology      Return in about 6 weeks (around 12/2/2021).          There are no Patient Instructions on file for this visit.     Ron Velazquez MD    Assessment/Plan

## 2021-10-27 LAB
A PHAGOCYTOPH IGG TITR SER IF: NEGATIVE {TITER}
A PHAGOCYTOPH IGM TITR SER IF: NEGATIVE {TITER}
B BURGDOR DNA SPEC QL NAA+PROBE: NEGATIVE
E CHAFFEENSIS IGG TITR SER IF: NEGATIVE {TITER}
E CHAFFEENSIS IGM TITR SER IF: NEGATIVE {TITER}
ENDOMYSIAL ANTIBODY TITER IGA: NORMAL TITER
ENDOMYSIAL ANTIBODY TITER IGG: NORMAL TITER
GLIADIN PEPTIDE IGA SER-ACNC: 6 UNITS (ref 0–19)
GLIADIN PEPTIDE IGG SER-ACNC: 2 UNITS (ref 0–19)
H PYLORI IGA SER-ACNC: <9 UNITS (ref 0–8.9)
H PYLORI IGG SER IA-ACNC: 0.21 INDEX VALUE (ref 0–0.79)
H PYLORI IGM SER-ACNC: <9 UNITS (ref 0–8.9)
R RICKETTSI IGG SER QL IA: NEGATIVE
R RICKETTSI IGM SER-ACNC: 1.72 INDEX (ref 0–0.89)
TTG IGA SER-ACNC: <2 U/ML (ref 0–3)
TTG IGG SER-ACNC: 4 U/ML (ref 0–5)
VIT B12 SERPL-MCNC: 497 PG/ML (ref 211–946)
VIT B6 SERPL-MCNC: 13.6 UG/L (ref 5.3–46.7)

## 2021-11-02 ENCOUNTER — OFFICE VISIT (OUTPATIENT)
Dept: NEUROLOGY | Facility: CLINIC | Age: 81
End: 2021-11-02

## 2021-11-02 VITALS
HEART RATE: 76 BPM | OXYGEN SATURATION: 98 % | RESPIRATION RATE: 18 BRPM | DIASTOLIC BLOOD PRESSURE: 82 MMHG | HEIGHT: 70 IN | BODY MASS INDEX: 30.95 KG/M2 | SYSTOLIC BLOOD PRESSURE: 131 MMHG | TEMPERATURE: 97.3 F | WEIGHT: 216.2 LBS

## 2021-11-02 DIAGNOSIS — I10 ESSENTIAL HYPERTENSION: ICD-10-CM

## 2021-11-02 DIAGNOSIS — G47.9 RESTLESS SLEEPER: Primary | ICD-10-CM

## 2021-11-02 DIAGNOSIS — E03.9 HYPOTHYROIDISM, UNSPECIFIED TYPE: ICD-10-CM

## 2021-11-02 DIAGNOSIS — G47.33 OBSTRUCTIVE SLEEP APNEA (ADULT) (PEDIATRIC): ICD-10-CM

## 2021-11-02 PROCEDURE — 99213 OFFICE O/P EST LOW 20 MIN: CPT | Performed by: NURSE PRACTITIONER

## 2021-11-02 NOTE — PATIENT INSTRUCTIONS
Sleep Apnea  Sleep apnea affects breathing during sleep. It causes breathing to stop for a short time or to become shallow. It can also increase the risk of:  · Heart attack.  · Stroke.  · Being very overweight (obese).  · Diabetes.  · Heart failure.  · Irregular heartbeat.  The goal of treatment is to help you breathe normally again.  What are the causes?  There are three kinds of sleep apnea:  · Obstructive sleep apnea. This is caused by a blocked or collapsed airway.  · Central sleep apnea. This happens when the brain does not send the right signals to the muscles that control breathing.  · Mixed sleep apnea. This is a combination of obstructive and central sleep apnea.  The most common cause of this condition is a collapsed or blocked airway. This can happen if:  · Your throat muscles are too relaxed.  · Your tongue and tonsils are too large.  · You are overweight.  · Your airway is too small.  What increases the risk?  · Being overweight.  · Smoking.  · Having a small airway.  · Being older.  · Being male.  · Drinking alcohol.  · Taking medicines to calm yourself (sedatives or tranquilizers).  · Having family members with the condition.  What are the signs or symptoms?  · Trouble staying asleep.  · Being sleepy or tired during the day.  · Getting angry a lot.  · Loud snoring.  · Headaches in the morning.  · Not being able to focus your mind (concentrate).  · Forgetting things.  · Less interest in sex.  · Mood swings.  · Personality changes.  · Feelings of sadness (depression).  · Waking up a lot during the night to pee (urinate).  · Dry mouth.  · Sore throat.  How is this diagnosed?  · Your medical history.  · A physical exam.  · A test that is done when you are sleeping (sleep study). The test is most often done in a sleep lab but may also be done at home.  How is this treated?    · Sleeping on your side.  · Using a medicine to get rid of mucus in your nose (decongestant).  · Avoiding the use of alcohol,  medicines to help you relax, or certain pain medicines (narcotics).  · Losing weight, if needed.  · Changing your diet.  · Not smoking.  · Using a machine to open your airway while you sleep, such as:  ? An oral appliance. This is a mouthpiece that shifts your lower jaw forward.  ? A CPAP device. This device blows air through a mask when you breathe out (exhale).  ? An EPAP device. This has valves that you put in each nostril.  ? A BPAP device. This device blows air through a mask when you breathe in (inhale) and breathe out.  · Having surgery if other treatments do not work.  It is important to get treatment for sleep apnea. Without treatment, it can lead to:  · High blood pressure.  · Coronary artery disease.  · In men, not being able to have an erection (impotence).  · Reduced thinking ability.  Follow these instructions at home:  Lifestyle  · Make changes that your doctor recommends.  · Eat a healthy diet.  · Lose weight if needed.  · Avoid alcohol, medicines to help you relax, and some pain medicines.  · Do not use any products that contain nicotine or tobacco, such as cigarettes, e-cigarettes, and chewing tobacco. If you need help quitting, ask your doctor.  General instructions  · Take over-the-counter and prescription medicines only as told by your doctor.  · If you were given a machine to use while you sleep, use it only as told by your doctor.  · If you are having surgery, make sure to tell your doctor you have sleep apnea. You may need to bring your device with you.  · Keep all follow-up visits as told by your doctor. This is important.  Contact a doctor if:  · The machine that you were given to use during sleep bothers you or does not seem to be working.  · You do not get better.  · You get worse.  Get help right away if:  · Your chest hurts.  · You have trouble breathing in enough air.  · You have an uncomfortable feeling in your back, arms, or stomach.  · You have trouble talking.  · One side of your  body feels weak.  · A part of your face is hanging down.  These symptoms may be an emergency. Do not wait to see if the symptoms will go away. Get medical help right away. Call your local emergency services (911 in the [place] Do not drive yourself to the hospital.  Summary  · This condition affects breathing during sleep.  · The most common cause is a collapsed or blocked airway.  · The goal of treatment is to help you breathe normally while you sleep.  This information is not intended to replace advice given to you by your health care provider. Make sure you discuss any questions you have with your health care provider.  Document Revised: 10/04/2019 Document Reviewed: 08/13/2019  [employer] Patient Education © 2021 [employer]

## 2021-11-02 NOTE — PROGRESS NOTES
New Sleep Patient Office Visit      Patient Name: Cipriano Montes  : 1940   MRN: [id number]     Referring Physician: Ron Velazquez MD    Chief Complaint:    Chief Complaint   Patient presents with   • Advice Only     patient is here to establish care for KATHY. Takes alot of naps, does not snore while sleeping. Not currently on CPAP machine. wakes up all throughout the night. Bought a new mattress to see if it would help, it does not. Trouble getting to sleep because of mind racing.       History of Present Illness: Cipriano Montes is a 81 y.o. male who is here today to establish care with Sleep Medicine.  Sleep questionnaire reviewed.  It takes him 30 minutes to an hour to fall asleep at night, he wakes up 2-3 times during the night, he some times has difficulty falling back to sleep, he sleeps about 6 hours per night, he experiences restless or disturbed sleep, he naps on some days, he does not think he snores but says he use to, he wakes up with a dry mouth on some days, he has daytime sleepiness.  He says he has started to sleep less as he has aged.  He now has problems falling asleep.  Additional risk factors- BMI 31, hypothyroidism, HTN, HL, vitamin B12 deficiency.      Bellflower Score: 5    Subjective      Review of Systems:   Review of Systems   Constitutional: Negative for fatigue, fever, unexpected weight gain and unexpected weight loss.   HENT: Negative for hearing loss, sore throat, swollen glands, tinnitus and trouble swallowing.    Eyes: Negative for blurred vision, double vision, photophobia and visual disturbance.   Respiratory: Negative for cough, chest tightness and shortness of breath.    Cardiovascular: Negative for chest pain, palpitations and leg swelling.   Gastrointestinal: Negative for constipation, diarrhea and nausea.   Endocrine: Negative for cold intolerance and heat intolerance.   Musculoskeletal: Negative for gait problem, neck pain and neck stiffness.   Skin:  Negative for color change and rash.   Allergic/Immunologic: Negative for environmental allergies and food allergies.   Neurological: Negative for dizziness, syncope, facial asymmetry, speech difficulty, weakness, headache, memory problem and confusion.   Psychiatric/Behavioral: Positive for sleep disturbance. Negative for agitation, behavioral problems and depressed mood. The patient is not nervous/anxious.        Past Medical History:   Past Medical History:   Diagnosis Date   • Allergies    • Anemia    • Arthritis    • Asthma    • Cataracts, bilateral    • Depression    • Hypertension    • Hypothyroidism    • Kidney stones    • Peripheral neuropathy    • Thyroid disease    • Thyroid disorder        Past Surgical History:   Past Surgical History:   Procedure Laterality Date   • COLON SURGERY     • TONSILLECTOMY     • TONSILLECTOMY         Family History:   Family History   Problem Relation Age of Onset   • Cancer Other    • Diabetes Other    • Prostate cancer Other    • Leukemia Mother    • Diabetes Father        Social History:   Social History     Socioeconomic History   • Marital status:    Tobacco Use   • Smoking status: Never Smoker   • Smokeless tobacco: Never Used   Vaping Use   • Vaping Use: Never used   Substance and Sexual Activity   • Alcohol use: Yes     Comment: Rarely   • Drug use: No   • Sexual activity: Defer       Medications:     Current Outpatient Medications:   •  acetaminophen (Tylenol 8 Hour) 650 MG 8 hr tablet, Every 6 (Six) Hours., Disp: , Rfl:   •  cyanocobalamin 1000 MCG/ML injection, Inject 1,000 mcg into the appropriate muscle as directed by prescriber Every 28 (Twenty-Eight) Days., Disp: , Rfl:   •  Fluticasone Furoate-Vilanterol (Breo Ellipta) 100-25 MCG/INH inhaler, Inhale 1 puff Daily., Disp: 3 each, Rfl: 3  •  ipratropium (ATROVENT) 0.06 % nasal spray, 2 sprays into the nostril(s) as directed by provider every night at bedtime., Disp: 15 mL, Rfl: 12  •  levothyroxine  "(SYNTHROID, LEVOTHROID) 100 MCG tablet, TAKE 1 TABLET BY MOUTH DAILY, Disp: 90 tablet, Rfl: 3  •  Loratadine (Claritin) 10 MG capsule, Take 1 capsule by mouth Daily., Disp: , Rfl:   •  NIFEdipine XL (PROCARDIA XL) 60 MG 24 hr tablet, Take 1 tablet by mouth Daily., Disp: 90 tablet, Rfl: 3  •  Phenylephrine-APAP-guaiFENesin (Mucinex Sinus-Max Congestion) -400 MG/20ML liquid, Take  by mouth Daily As Needed., Disp: , Rfl:   •  Probiotic Product (PROBIOTIC-10 PO), Probiotic  1 capsule daily, Disp: , Rfl:   •  Syringe/Needle, Disp, (B-D 3CC LUER-CAITLIN SYR 25GX1\") 25G X 1\" 3 ML misc, , Disp: , Rfl:   •  tamsulosin (FLOMAX) 0.4 MG capsule 24 hr capsule, TAKE 1 CAPSULE BY MOUTH DAILY, Disp: 90 capsule, Rfl: 3    Allergies:   Allergies   Allergen Reactions   • Oxycodone-Acetaminophen GI Intolerance       Objective     Physical Exam:  Vital Signs:   Vitals:    11/02/21 1101   BP: 131/82   BP Location: Right arm   Patient Position: Sitting   Cuff Size: Adult   Pulse: 76   Resp: 18   Temp: 97.3 °F (36.3 °C)   TempSrc: Temporal   SpO2: 98%   Weight: 98.1 kg (216 lb 3.2 oz)   Height: 177.8 cm (70\")   PainSc: 0-No pain     BMI: Body mass index is 31.02 kg/m².  Neck Circumference: 15.5 inches    Physical Exam  Vitals and nursing note reviewed.   Constitutional:       General: He is not in acute distress.     Appearance: He is well-developed. He is not diaphoretic.   HENT:      Head: Normocephalic and atraumatic.      Comments: Mallampati 3-4  Eyes:      Conjunctiva/sclera: Conjunctivae normal.      Pupils: Pupils are equal, round, and reactive to light.   Neck:      Trachea: Trachea normal.   Cardiovascular:      Rate and Rhythm: Normal rate and regular rhythm.      Heart sounds: Normal heart sounds. No murmur heard.  No friction rub. No gallop.    Pulmonary:      Effort: Pulmonary effort is normal. No respiratory distress.      Breath sounds: Normal breath sounds. No wheezing or rales.   Musculoskeletal:         General: Normal " range of motion.   Skin:     General: Skin is warm and dry.      Findings: No rash.   Neurological:      Mental Status: He is alert and oriented to person, place, and time.   Psychiatric:         Mood and Affect: Mood normal.         Behavior: Behavior normal.         Thought Content: Thought content normal.         Judgment: Judgment normal.         Assessment / Plan      Assessment/Plan:   Diagnoses and all orders for this visit:    1. Restless sleeper (Primary)  -     Home Sleep Study; Future    2. Essential hypertension  -     Home Sleep Study; Future    3. Hypothyroidism, unspecified type  -     Home Sleep Study; Future    4. BMI 31.0-31.9,adult  -     Home Sleep Study; Future    5. Obstructive sleep apnea (adult) (pediatric)   -     Home Sleep Study; Future    *PSG offered but patient prefers a home sleep study.   *Education on KATHY provided today.      Follow Up:   No follow-ups on file.    I have advised the patient the need to continue the use of CPAP.  Gold standard for treatment of sleep apnea includes weight loss, use of cpap and avoidance of alcohol.  Untreated KATHY may increase the risk for development of hypertension, stroke, myocardial infarction, diabetes, cardiovascular disease, work-related issues and driving accidents. I have counseled and advised the patient to avoid driving or operating heavy/dangerous equipment if feeling drowsy.     LENNY Mejia, FNP-C  [facility]       Please note that portions of this note may have been completed with a voice recognition program. Efforts were made to edit the dictations, but occasionally words are mistranscribed.

## 2021-11-10 ENCOUNTER — OFFICE VISIT (OUTPATIENT)
Dept: INTERNAL MEDICINE | Facility: CLINIC | Age: 81
End: 2021-11-10

## 2021-11-10 VITALS
SYSTOLIC BLOOD PRESSURE: 122 MMHG | HEART RATE: 80 BPM | HEIGHT: 70 IN | RESPIRATION RATE: 18 BRPM | BODY MASS INDEX: 30.46 KG/M2 | WEIGHT: 212.8 LBS | OXYGEN SATURATION: 97 % | TEMPERATURE: 98.2 F | DIASTOLIC BLOOD PRESSURE: 70 MMHG

## 2021-11-10 DIAGNOSIS — I10 ESSENTIAL HYPERTENSION: Primary | ICD-10-CM

## 2021-11-10 DIAGNOSIS — Z12.5 ENCOUNTER FOR SCREENING FOR MALIGNANT NEOPLASM OF PROSTATE: ICD-10-CM

## 2021-11-10 DIAGNOSIS — E53.1 VITAMIN B6 DEFICIENCY: ICD-10-CM

## 2021-11-10 DIAGNOSIS — E53.8 VITAMIN B12 DEFICIENCY: ICD-10-CM

## 2021-11-10 DIAGNOSIS — N18.31 STAGE 3A CHRONIC KIDNEY DISEASE (HCC): ICD-10-CM

## 2021-11-10 PROCEDURE — 99214 OFFICE O/P EST MOD 30 MIN: CPT | Performed by: INTERNAL MEDICINE

## 2021-11-10 NOTE — PROGRESS NOTES
"Subjective     Patient ID: Cipriano Montes is a 81 y.o. male. Patient is here for management of multiple medical problems.     Chief Complaint   Patient presents with   • Follow-up     RMSF     History of Present Illness     RMSF    Better now. Off abx.    Sleeping a little better.    Htn. Better. Exercise going well        The following portions of the patient's history were reviewed and updated as appropriate: allergies, current medications, past family history, past medical history, past social history, past surgical history and problem list.    Review of Systems    Current Outpatient Medications:   •  acetaminophen (Tylenol 8 Hour) 650 MG 8 hr tablet, Every 6 (Six) Hours., Disp: , Rfl:   •  cyanocobalamin 1000 MCG/ML injection, Inject 1,000 mcg into the appropriate muscle as directed by prescriber Every 28 (Twenty-Eight) Days., Disp: , Rfl:   •  Fluticasone Furoate-Vilanterol (Breo Ellipta) 100-25 MCG/INH inhaler, Inhale 1 puff Daily., Disp: 3 each, Rfl: 3  •  ipratropium (ATROVENT) 0.06 % nasal spray, 2 sprays into the nostril(s) as directed by provider every night at bedtime., Disp: 15 mL, Rfl: 12  •  levothyroxine (SYNTHROID, LEVOTHROID) 100 MCG tablet, TAKE 1 TABLET BY MOUTH DAILY, Disp: 90 tablet, Rfl: 3  •  Loratadine (Claritin) 10 MG capsule, Take 1 capsule by mouth Daily., Disp: , Rfl:   •  NIFEdipine XL (PROCARDIA XL) 60 MG 24 hr tablet, Take 1 tablet by mouth Daily., Disp: 90 tablet, Rfl: 3  •  Phenylephrine-APAP-guaiFENesin (Mucinex Sinus-Max Congestion) -400 MG/20ML liquid, Take  by mouth Daily As Needed., Disp: , Rfl:   •  Probiotic Product (PROBIOTIC-10 PO), Probiotic  1 capsule daily, Disp: , Rfl:   •  Syringe/Needle, Disp, (B-D 3CC LUER-CAITLIN SYR 25GX1\") 25G X 1\" 3 ML misc, , Disp: , Rfl:   •  tamsulosin (FLOMAX) 0.4 MG capsule 24 hr capsule, TAKE 1 CAPSULE BY MOUTH DAILY, Disp: 90 capsule, Rfl: 3    Objective      Blood pressure 122/70, pulse 80, temperature 98.2 °F (36.8 °C), resp. rate " "18, height 177.8 cm (70\"), weight 96.5 kg (212 lb 12.8 oz), SpO2 97 %.    Physical Exam     General Appearance:    Alert, cooperative, no distress, appears stated age   Head:    Normocephalic, without obvious abnormality, atraumatic   Eyes:    PERRL, conjunctiva/corneas clear, EOM's intact   Ears:    Normal TM's and external ear canals, both ears   Nose:   Nares normal, septum midline, mucosa normal, no drainage   or sinus tenderness   Throat:   Lips, mucosa, and tongue normal; teeth and gums normal   Neck:   Supple, symmetrical, trachea midline, no adenopathy;        thyroid:  No enlargement/tenderness/nodules; no carotid    bruit or JVD   Back:     Symmetric, no curvature, ROM normal, no CVA tenderness   Lungs:     Clear to auscultation bilaterally, respirations unlabored   Chest wall:    No tenderness or deformity   Heart:    Regular rate and rhythm, S1 and S2 normal, no murmur,        rub or gallop   Abdomen:     Soft, non-tender, bowel sounds active all four quadrants,     no masses, no organomegaly   Extremities:   Extremities normal, atraumatic, no cyanosis or edema   Pulses:   2+ and symmetric all extremities   Skin:   Skin color, texture, turgor normal, no rashes or lesions   Lymph nodes:   Cervical, supraclavicular, and axillary nodes normal   Neurologic:   CNII-XII intact. Normal strength, sensation and reflexes       throughout      Results for orders placed or performed in visit on 10/21/21   Vitamin B12    Specimen: Blood   Result Value Ref Range    Vitamin B-12 497 211 - 946 pg/mL   Vitamin B6    Specimen: Blood   Result Value Ref Range    Vitamin B6 13.6 5.3 - 46.7 ug/L   Glia(IgA / G) & TTG(IgA / G)    Specimen: Blood   Result Value Ref Range    Gliadin Deamidated Peptide Ab, IgA 6 0 - 19 units    Deaminated Gliadin Ab IgG 2 0 - 19 units    Tissue Transglutaminase IgA <2 0 - 3 U/mL    Tissue Transglutaminase IgG 4 0 - 5 U/mL   Endomysial Antibody, IgA / IGG Titer    Specimen: Blood   Result Value " Ref Range    Endomysial Antibody Titer IgA <1:2.5 titer    Endomysial Antibody Titer IgG <1:2.5 titer   Helicobacter Pylori, IgA IgG IgM    Specimen: Blood   Result Value Ref Range    H. pylori IgG 0.21 0.00 - 0.79 Index Value    H. pylori, IgA ABS <9.0 0.0 - 8.9 units    H. Pylori, IgM <9.0 0.0 - 8.9 units   [place] Spotted Fever, IgM    Specimen: Blood   Result Value Ref Range    RMSF IgM 1.72 (H) 0.00 - 0.89 index   Ga[place] Spotted Fever, IgG    Specimen: Blood   Result Value Ref Range    RMSF IgG Negative Negative   Ehrlichia Antibody Panel    Specimen: Blood   Result Value Ref Range    E. chaffeensis ([facility]ME) IgG Titer Negative Neg:<1:64    E. chaffeensis ([facility]ME) IgM Titer Negative Neg:<1:20    HGE IgG Titer Negative Neg:<1:64    HGE IgM Titer Negative Neg:<1:20   Lyme Disease, PCR    Specimen: Lumbar Puncture; Cerebrospinal Fluid   Result Value Ref Range    Lyme Disease(B.burgdorferi)PCR Negative Negative         Assessment/Plan       Energy some better.   Pt exercising. RMSF up but symptoms better. Will hold on tx.    b6 level now nomral.     b12 good on oral replacement.      Diagnoses and all orders for this visit:    1. Essential hypertension (Primary)  -     Lipid Panel  -     CBC & Differential  -     Vitamin B12  -     Comprehensive Metabolic Panel  -     PSA Screen  -     TSH  -     Vitamin B6    2. Stage 3a chronic kidney disease (HCC)  -     Lipid Panel  -     CBC & Differential  -     Vitamin B12  -     Comprehensive Metabolic Panel  -     PSA Screen  -     TSH  -     Vitamin B6    3. Vitamin B6 deficiency  -     Lipid Panel  -     CBC & Differential  -     Vitamin B12  -     Comprehensive Metabolic Panel  -     PSA Screen  -     TSH  -     Vitamin B6    4. Vitamin B12 deficiency  -     Lipid Panel  -     CBC & Differential  -     Vitamin B12  -     Comprehensive Metabolic Panel  -     PSA Screen  -     TSH  -     Vitamin B6    5. Encounter for screening for malignant neoplasm of prostate   -      PSA Screen      Return in about 6 months (around 5/10/2022).          There are no Patient Instructions on file for this visit.     Ron Velazquez MD    Assessment/Plan

## 2022-04-13 DIAGNOSIS — E03.9 ACQUIRED HYPOTHYROIDISM: ICD-10-CM

## 2022-04-13 RX ORDER — LEVOTHYROXINE SODIUM 0.1 MG/1
100 TABLET ORAL DAILY
Qty: 90 TABLET | Refills: 3 | OUTPATIENT
Start: 2022-04-13

## 2022-04-13 RX ORDER — TAMSULOSIN HYDROCHLORIDE 0.4 MG/1
1 CAPSULE ORAL DAILY
Qty: 90 CAPSULE | Refills: 3 | Status: ON HOLD | OUTPATIENT
Start: 2022-04-13 | End: 2022-09-30

## 2022-04-13 RX ORDER — LEVOTHYROXINE SODIUM 0.1 MG/1
100 TABLET ORAL DAILY
Qty: 90 TABLET | Refills: 3 | Status: SHIPPED | OUTPATIENT
Start: 2022-04-13

## 2022-05-09 ENCOUNTER — LAB (OUTPATIENT)
Dept: LAB | Facility: HOSPITAL | Age: 82
End: 2022-05-09

## 2022-05-09 LAB
ALBUMIN SERPL-MCNC: 4.1 G/DL (ref 3.5–5.2)
ALBUMIN/GLOB SERPL: 1.3 G/DL
ALP SERPL-CCNC: 111 U/L (ref 39–117)
ALT SERPL W P-5'-P-CCNC: 91 U/L (ref 1–41)
ANION GAP SERPL CALCULATED.3IONS-SCNC: 10.6 MMOL/L (ref 5–15)
AST SERPL-CCNC: 60 U/L (ref 1–40)
BASOPHILS # BLD AUTO: 0.07 10*3/MM3 (ref 0–0.2)
BASOPHILS NFR BLD AUTO: 1 % (ref 0–1.5)
BILIRUB SERPL-MCNC: 0.6 MG/DL (ref 0–1.2)
BUN SERPL-MCNC: 16 MG/DL (ref 8–23)
BUN/CREAT SERPL: 14.7 (ref 7–25)
CALCIUM SPEC-SCNC: 9.5 MG/DL (ref 8.6–10.5)
CHLORIDE SERPL-SCNC: 106 MMOL/L (ref 98–107)
CHOLEST SERPL-MCNC: 178 MG/DL (ref 0–200)
CO2 SERPL-SCNC: 23.4 MMOL/L (ref 22–29)
CREAT SERPL-MCNC: 1.09 MG/DL (ref 0.76–1.27)
DEPRECATED RDW RBC AUTO: 41.8 FL (ref 37–54)
EGFRCR SERPLBLD CKD-EPI 2021: 68.6 ML/MIN/1.73
EOSINOPHIL # BLD AUTO: 0.29 10*3/MM3 (ref 0–0.4)
EOSINOPHIL NFR BLD AUTO: 4.1 % (ref 0.3–6.2)
ERYTHROCYTE [DISTWIDTH] IN BLOOD BY AUTOMATED COUNT: 13.5 % (ref 12.3–15.4)
GLOBULIN UR ELPH-MCNC: 3.2 GM/DL
GLUCOSE SERPL-MCNC: 99 MG/DL (ref 65–99)
HCT VFR BLD AUTO: 48.8 % (ref 37.5–51)
HDLC SERPL-MCNC: 39 MG/DL (ref 40–60)
HGB BLD-MCNC: 16.9 G/DL (ref 13–17.7)
IMM GRANULOCYTES # BLD AUTO: 0.02 10*3/MM3 (ref 0–0.05)
IMM GRANULOCYTES NFR BLD AUTO: 0.3 % (ref 0–0.5)
LDLC SERPL CALC-MCNC: 119 MG/DL (ref 0–100)
LDLC/HDLC SERPL: 3.01 {RATIO}
LYMPHOCYTES # BLD AUTO: 1.58 10*3/MM3 (ref 0.7–3.1)
LYMPHOCYTES NFR BLD AUTO: 22.4 % (ref 19.6–45.3)
MCH RBC QN AUTO: 29.8 PG (ref 26.6–33)
MCHC RBC AUTO-ENTMCNC: 34.6 G/DL (ref 31.5–35.7)
MCV RBC AUTO: 85.9 FL (ref 79–97)
MONOCYTES # BLD AUTO: 0.57 10*3/MM3 (ref 0.1–0.9)
MONOCYTES NFR BLD AUTO: 8.1 % (ref 5–12)
NEUTROPHILS NFR BLD AUTO: 4.51 10*3/MM3 (ref 1.7–7)
NEUTROPHILS NFR BLD AUTO: 64.1 % (ref 42.7–76)
NRBC BLD AUTO-RTO: 0 /100 WBC (ref 0–0.2)
PLATELET # BLD AUTO: 248 10*3/MM3 (ref 140–450)
PMV BLD AUTO: 9.6 FL (ref 6–12)
POTASSIUM SERPL-SCNC: 4.3 MMOL/L (ref 3.5–5.2)
PROT SERPL-MCNC: 7.3 G/DL (ref 6–8.5)
PSA SERPL-MCNC: 1.37 NG/ML (ref 0–4)
RBC # BLD AUTO: 5.68 10*6/MM3 (ref 4.14–5.8)
SODIUM SERPL-SCNC: 140 MMOL/L (ref 136–145)
TRIGL SERPL-MCNC: 109 MG/DL (ref 0–150)
TSH SERPL DL<=0.05 MIU/L-ACNC: 1.23 UIU/ML (ref 0.27–4.2)
VIT B12 BLD-MCNC: 443 PG/ML (ref 211–946)
VLDLC SERPL-MCNC: 20 MG/DL (ref 5–40)
WBC NRBC COR # BLD: 7.04 10*3/MM3 (ref 3.4–10.8)

## 2022-05-09 PROCEDURE — 85025 COMPLETE CBC W/AUTO DIFF WBC: CPT | Performed by: INTERNAL MEDICINE

## 2022-05-09 PROCEDURE — G0103 PSA SCREENING: HCPCS | Performed by: INTERNAL MEDICINE

## 2022-05-09 PROCEDURE — 36415 COLL VENOUS BLD VENIPUNCTURE: CPT | Performed by: INTERNAL MEDICINE

## 2022-05-09 PROCEDURE — 84443 ASSAY THYROID STIM HORMONE: CPT | Performed by: INTERNAL MEDICINE

## 2022-05-09 PROCEDURE — 80053 COMPREHEN METABOLIC PANEL: CPT | Performed by: INTERNAL MEDICINE

## 2022-05-09 PROCEDURE — 82607 VITAMIN B-12: CPT | Performed by: INTERNAL MEDICINE

## 2022-05-09 PROCEDURE — 80061 LIPID PANEL: CPT | Performed by: INTERNAL MEDICINE

## 2022-05-11 ENCOUNTER — OFFICE VISIT (OUTPATIENT)
Dept: INTERNAL MEDICINE | Facility: CLINIC | Age: 82
End: 2022-05-11

## 2022-05-11 VITALS
TEMPERATURE: 98.7 F | DIASTOLIC BLOOD PRESSURE: 88 MMHG | OXYGEN SATURATION: 95 % | RESPIRATION RATE: 16 BRPM | HEIGHT: 70 IN | BODY MASS INDEX: 30.64 KG/M2 | SYSTOLIC BLOOD PRESSURE: 142 MMHG | WEIGHT: 214 LBS | HEART RATE: 84 BPM

## 2022-05-11 DIAGNOSIS — A77.0 RMSF (ROCKY MOUNTAIN SPOTTED FEVER): ICD-10-CM

## 2022-05-11 DIAGNOSIS — N40.1 BPH WITH OBSTRUCTION/LOWER URINARY TRACT SYMPTOMS: ICD-10-CM

## 2022-05-11 DIAGNOSIS — R53.83 FATIGUE, UNSPECIFIED TYPE: ICD-10-CM

## 2022-05-11 DIAGNOSIS — I10 ESSENTIAL HYPERTENSION: Primary | ICD-10-CM

## 2022-05-11 DIAGNOSIS — N13.8 BPH WITH OBSTRUCTION/LOWER URINARY TRACT SYMPTOMS: ICD-10-CM

## 2022-05-11 DIAGNOSIS — R25.1 TREMOR: ICD-10-CM

## 2022-05-11 PROCEDURE — 99214 OFFICE O/P EST MOD 30 MIN: CPT | Performed by: INTERNAL MEDICINE

## 2022-05-11 RX ORDER — DOXYCYCLINE HYCLATE 100 MG/1
100 CAPSULE ORAL 2 TIMES DAILY
Qty: 60 CAPSULE | Refills: 1 | Status: SHIPPED | OUTPATIENT
Start: 2022-05-11 | End: 2022-08-12

## 2022-05-11 RX ORDER — MULTIVITAMIN WITH IRON
1 TABLET ORAL DAILY
Qty: 90 TABLET | Refills: 3 | Status: SHIPPED | OUTPATIENT
Start: 2022-05-11 | End: 2023-05-11

## 2022-05-11 RX ORDER — LANOLIN ALCOHOL/MO/W.PET/CERES
100 CREAM (GRAM) TOPICAL DAILY
COMMUNITY

## 2022-05-11 NOTE — PROGRESS NOTES
"Subjective     Patient ID: Cipriano Montes is a 82 y.o. male. Patient is here for management of multiple medical problems.     Chief Complaint   Patient presents with   • Hypertension   • Tremors     Hands bilateral     History of Present Illness     htn      Tremor in hands.  Getting worse..      No family hx of tremor.    Tired all the time.  Joint pain.            The following portions of the patient's history were reviewed and updated as appropriate: allergies, current medications, past family history, past medical history, past social history, past surgical history and problem list.    Review of Systems    Current Outpatient Medications:   •  acetaminophen (TYLENOL) 650 MG 8 hr tablet, Every 6 (Six) Hours., Disp: , Rfl:   •  Fluticasone Furoate-Vilanterol (Breo Ellipta) 100-25 MCG/INH inhaler, Inhale 1 puff Daily., Disp: 3 each, Rfl: 3  •  ipratropium (ATROVENT) 0.06 % nasal spray, 2 sprays into the nostril(s) as directed by provider every night at bedtime., Disp: 15 mL, Rfl: 12  •  levothyroxine (SYNTHROID, LEVOTHROID) 100 MCG tablet, TAKE 1 TABLET BY MOUTH DAILY, Disp: 90 tablet, Rfl: 3  •  Loratadine 10 MG capsule, Take 1 capsule by mouth Daily., Disp: , Rfl:   •  NIFEdipine XL (PROCARDIA XL) 60 MG 24 hr tablet, Take 1 tablet by mouth Daily., Disp: 90 tablet, Rfl: 3  •  Probiotic Product (PROBIOTIC-10 PO), Probiotic  1 capsule daily, Disp: , Rfl:   •  tamsulosin (FLOMAX) 0.4 MG capsule 24 hr capsule, TAKE 1 CAPSULE BY MOUTH DAILY, Disp: 90 capsule, Rfl: 3  •  vitamin B-12 (CYANOCOBALAMIN) 1000 MCG tablet, Take 1,000 mcg by mouth Daily., Disp: , Rfl:   •  B Complex-C (B-complex with vitamin C) tablet, Take 1 tablet by mouth Daily., Disp: 90 tablet, Rfl: 3  •  doxycycline (VIBRAMYCIN) 100 MG capsule, Take 1 capsule by mouth 2 (Two) Times a Day., Disp: 60 capsule, Rfl: 1    Objective      Blood pressure 142/88, pulse 84, temperature 98.7 °F (37.1 °C), resp. rate 16, height 177.8 cm (70\"), weight 97.1 kg " (214 lb), SpO2 95 %.    Physical Exam     General Appearance:    Alert, cooperative, no distress, appears stated age   Head:    Normocephalic, without obvious abnormality, atraumatic   Eyes:    PERRL, conjunctiva/corneas clear, EOM's intact   Ears:    Normal TM's and external ear canals, both ears   Nose:   Nares normal, septum midline, mucosa normal, no drainage   or sinus tenderness   Throat:   Lips, mucosa, and tongue normal; teeth and gums normal   Neck:   Supple, symmetrical, trachea midline, no adenopathy;        thyroid:  No enlargement/tenderness/nodules; no carotid    bruit or JVD   Back:     Symmetric, no curvature, ROM normal, no CVA tenderness   Lungs:     Clear to auscultation bilaterally, respirations unlabored   Chest wall:    No tenderness or deformity   Heart:    Regular rate and rhythm, S1 and S2 normal, no murmur,        rub or gallop   Abdomen:     Soft, non-tender, bowel sounds active all four quadrants,     no masses, no organomegaly   Extremities:   Extremities normal, atraumatic, no cyanosis or edema   Pulses:   2+ and symmetric all extremities   Skin:   Skin color, texture, turgor normal, no rashes or lesions   Lymph nodes:   Cervical, supraclavicular, and axillary nodes normal   Neurologic:   CNII-XII intact. Normal strength, sensation and reflexes       throughout      Results for orders placed or performed in visit on 11/10/21   Lipid Panel    Specimen: Blood   Result Value Ref Range    Total Cholesterol 178 0 - 200 mg/dL    Triglycerides 109 0 - 150 mg/dL    HDL Cholesterol 39 (L) 40 - 60 mg/dL    LDL Cholesterol  119 (H) 0 - 100 mg/dL    VLDL Cholesterol 20 5 - 40 mg/dL    LDL/HDL Ratio 3.01    Vitamin B12    Specimen: Blood   Result Value Ref Range    Vitamin B-12 443 211 - 946 pg/mL   Comprehensive Metabolic Panel    Specimen: Blood   Result Value Ref Range    Glucose 99 65 - 99 mg/dL    BUN 16 8 - 23 mg/dL    Creatinine 1.09 0.76 - 1.27 mg/dL    Sodium 140 136 - 145 mmol/L     Potassium 4.3 3.5 - 5.2 mmol/L    Chloride 106 98 - 107 mmol/L    CO2 23.4 22.0 - 29.0 mmol/L    Calcium 9.5 8.6 - 10.5 mg/dL    Total Protein 7.3 6.0 - 8.5 g/dL    Albumin 4.10 3.50 - 5.20 g/dL    ALT (SGPT) 91 (H) 1 - 41 U/L    AST (SGOT) 60 (H) 1 - 40 U/L    Alkaline Phosphatase 111 39 - 117 U/L    Total Bilirubin 0.6 0.0 - 1.2 mg/dL    Globulin 3.2 gm/dL    A/G Ratio 1.3 g/dL    BUN/Creatinine Ratio 14.7 7.0 - 25.0    Anion Gap 10.6 5.0 - 15.0 mmol/L    eGFR 68.6 >60.0 mL/min/1.73   PSA Screen    Specimen: Blood   Result Value Ref Range    PSA 1.370 0.000 - 4.000 ng/mL   TSH    Specimen: Blood   Result Value Ref Range    TSH 1.230 0.270 - 4.200 uIU/mL   CBC Auto Differential    Specimen: Blood   Result Value Ref Range    WBC 7.04 3.40 - 10.80 10*3/mm3    RBC 5.68 4.14 - 5.80 10*6/mm3    Hemoglobin 16.9 13.0 - 17.7 g/dL    Hematocrit 48.8 37.5 - 51.0 %    MCV 85.9 79.0 - 97.0 fL    MCH 29.8 26.6 - 33.0 pg    MCHC 34.6 31.5 - 35.7 g/dL    RDW 13.5 12.3 - 15.4 %    RDW-SD 41.8 37.0 - 54.0 fl    MPV 9.6 6.0 - 12.0 fL    Platelets 248 140 - 450 10*3/mm3    Neutrophil % 64.1 42.7 - 76.0 %    Lymphocyte % 22.4 19.6 - 45.3 %    Monocyte % 8.1 5.0 - 12.0 %    Eosinophil % 4.1 0.3 - 6.2 %    Basophil % 1.0 0.0 - 1.5 %    Immature Grans % 0.3 0.0 - 0.5 %    Neutrophils, Absolute 4.51 1.70 - 7.00 10*3/mm3    Lymphocytes, Absolute 1.58 0.70 - 3.10 10*3/mm3    Monocytes, Absolute 0.57 0.10 - 0.90 10*3/mm3    Eosinophils, Absolute 0.29 0.00 - 0.40 10*3/mm3    Basophils, Absolute 0.07 0.00 - 0.20 10*3/mm3    Immature Grans, Absolute 0.02 0.00 - 0.05 10*3/mm3    nRBC 0.0 0.0 - 0.2 /100 WBC         Assessment & Plan     Tremor worsening.  RMSF ab were trending up on the last labs.   Increasing depression.      Diagnoses and all orders for this visit:    1. Essential hypertension (Primary)  -     doxycycline (VIBRAMYCIN) 100 MG capsule; Take 1 capsule by mouth 2 (Two) Times a Day.  Dispense: 60 capsule; Refill: 1  -     B  Complex-C (B-complex with vitamin C) tablet; Take 1 tablet by mouth Daily.  Dispense: 90 tablet; Refill: 3    2. Tremor  -     doxycycline (VIBRAMYCIN) 100 MG capsule; Take 1 capsule by mouth 2 (Two) Times a Day.  Dispense: 60 capsule; Refill: 1  -     B Complex-C (B-complex with vitamin C) tablet; Take 1 tablet by mouth Daily.  Dispense: 90 tablet; Refill: 3    3. RMSF (Gaet[place] spotted fever)  -     doxycycline (VIBRAMYCIN) 100 MG capsule; Take 1 capsule by mouth 2 (Two) Times a Day.  Dispense: 60 capsule; Refill: 1    4. Fatigue, unspecified type  -     doxycycline (VIBRAMYCIN) 100 MG capsule; Take 1 capsule by mouth 2 (Two) Times a Day.  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          There are no Patient Instructions on file for this visit.     Ron Velazquez MD    Assessment & Plan

## 2022-05-20 ENCOUNTER — LAB (OUTPATIENT)
Dept: LAB | Facility: HOSPITAL | Age: 82
End: 2022-05-20

## 2022-05-20 PROCEDURE — 36415 COLL VENOUS BLD VENIPUNCTURE: CPT | Performed by: INTERNAL MEDICINE

## 2022-05-20 PROCEDURE — 84207 ASSAY OF VITAMIN B-6: CPT | Performed by: INTERNAL MEDICINE

## 2022-05-25 ENCOUNTER — TELEPHONE (OUTPATIENT)
Dept: INTERNAL MEDICINE | Facility: CLINIC | Age: 82
End: 2022-05-25

## 2022-05-25 RX ORDER — AMOXICILLIN AND CLAVULANATE POTASSIUM 875; 125 MG/1; MG/1
1 TABLET, FILM COATED ORAL EVERY 12 HOURS SCHEDULED
Qty: 60 TABLET | Refills: 1 | Status: SHIPPED | OUTPATIENT
Start: 2022-05-25 | End: 2022-08-12

## 2022-05-25 NOTE — TELEPHONE ENCOUNTER
Spoke with patient, states he took Doxycycline with a full stomach.  Cannot tolerate.  Please advise.

## 2022-05-25 NOTE — TELEPHONE ENCOUNTER
Caller: Cipriano Montes    Relationship: Self    Best call back number: [phone]    What is the best time to reach you: ANYTIME    Who are you requesting to speak with (clinical staff, provider,  specific staff member): CLINICAL  STAFF    What was the call regarding: PATIENT STATES THAT THE MEDICATION WAS PRESCRIBED HAS MADE HIM VOMIT AND HE STOPPED TAKING IT ON Sunday. PLEASE ADVISE    Do you require a callback: YES

## 2022-06-03 RX ORDER — NIFEDIPINE 60 MG/1
60 TABLET, EXTENDED RELEASE ORAL DAILY
Qty: 90 TABLET | Refills: 3 | Status: SHIPPED | OUTPATIENT
Start: 2022-06-03

## 2022-06-03 NOTE — TELEPHONE ENCOUNTER
Caller: [id number] - [place] - [address] AT [facility] [address] - [phone]  - [phone] FX    Relationship: PATIENT CALLING TO CHECK STATUS    Best call back number: [phone]    Requested Prescriptions:   Requested Prescriptions     Pending Prescriptions Disp Refills   • NIFEdipine XL (PROCARDIA XL) 60 MG 24 hr tablet [Pharmacy Med Name: NIFEDIPINE 60MG ER (XL/OS) TABLETS] 90 tablet 3     Sig: TAKE 1 TABLET BY MOUTH DAILY        Pharmacy where request should be sent: [facility] #[id number] - [place] - [address] AT [facility] [address] - [phone]  - [phone] FX     Additional details provided by patient: PLEASE REFILL    Does the patient have less than a 3 day supply:  [] Yes  [x] No    Mai Simmons Rep   06/03/22 12:47 EDT

## 2022-06-03 NOTE — TELEPHONE ENCOUNTER
Rx Refill Note  Requested Prescriptions     Pending Prescriptions Disp Refills   • NIFEdipine XL (PROCARDIA XL) 60 MG 24 hr tablet [Pharmacy Med Name: NIFEDIPINE 60MG ER (XL/OS) TABLETS] 90 tablet 3     Sig: TAKE 1 TABLET BY MOUTH DAILY      Last office visit with prescribing clinician: 5/11/2022      Next office visit with prescribing clinician: Visit date not found            Fátima Burrows LPN  06/03/22, 12:51 EDT

## 2022-06-06 LAB — VIT B6 SERPL-MCNC: 13.7 UG/L (ref 3.4–65.2)

## 2022-06-23 ENCOUNTER — OFFICE VISIT (OUTPATIENT)
Dept: UROLOGY | Facility: CLINIC | Age: 82
End: 2022-06-23

## 2022-06-23 VITALS
OXYGEN SATURATION: 96 % | HEART RATE: 87 BPM | SYSTOLIC BLOOD PRESSURE: 136 MMHG | DIASTOLIC BLOOD PRESSURE: 78 MMHG | WEIGHT: 215 LBS | BODY MASS INDEX: 30.78 KG/M2 | HEIGHT: 70 IN

## 2022-06-23 DIAGNOSIS — N13.8 BPH WITH OBSTRUCTION/LOWER URINARY TRACT SYMPTOMS: ICD-10-CM

## 2022-06-23 DIAGNOSIS — N40.1 BPH WITH OBSTRUCTION/LOWER URINARY TRACT SYMPTOMS: ICD-10-CM

## 2022-06-23 LAB
BILIRUB BLD-MCNC: NEGATIVE MG/DL
CLARITY, POC: CLEAR
COLOR UR: YELLOW
EXPIRATION DATE: ABNORMAL
GLUCOSE UR STRIP-MCNC: NEGATIVE MG/DL
KETONES UR QL: NEGATIVE
LEUKOCYTE EST, POC: ABNORMAL
Lab: ABNORMAL
NITRITE UR-MCNC: NEGATIVE MG/ML
PH UR: 6 [PH] (ref 5–8)
PROT UR STRIP-MCNC: ABNORMAL MG/DL
RBC # UR STRIP: ABNORMAL /UL
SP GR UR: 1.03 (ref 1–1.03)
UROBILINOGEN UR QL: NORMAL

## 2022-06-23 PROCEDURE — 99204 OFFICE O/P NEW MOD 45 MIN: CPT | Performed by: STUDENT IN AN ORGANIZED HEALTH CARE EDUCATION/TRAINING PROGRAM

## 2022-06-23 PROCEDURE — 81003 URINALYSIS AUTO W/O SCOPE: CPT | Performed by: STUDENT IN AN ORGANIZED HEALTH CARE EDUCATION/TRAINING PROGRAM

## 2022-06-23 PROCEDURE — 51798 US URINE CAPACITY MEASURE: CPT | Performed by: STUDENT IN AN ORGANIZED HEALTH CARE EDUCATION/TRAINING PROGRAM

## 2022-06-23 NOTE — PROGRESS NOTES
LUTS Male Office Visit      Patient Name: Cipriano Montes  : 1940   MRN: [id number]     Chief Complaint:  Lower Urinary Tract Symptoms.   Chief Complaint   Patient presents with   • Benign Prostatic Hypertrophy     With obstruction, LUTS          Referring Provider: Ron Velazquez MD    History of Present Illness: Mr. Montes is a 82 y.o. male with history of lower urinary tract symptoms presents to establish care.  The patient has had progressive BPH and lower urinary tract symptoms but he has never establish care with urology.  He states he was placed on Flomax about 15+ years ago by his primary care physician.  Primary urinary symptoms initiating tamsulosin were weak stream and slow stream.  He states being on Flomax helps tremendously.    He presents today because he is more worried about recent cataract surgery that he is planning, however he has not establish care with an ophthalmologist yet.  He states he will be seeing an ophthalmologist July.  He was told that he cannot take tamsulosin if considering cataract surgery.    For these reasons, the patient discontinue tamsulosin a few weeks ago for about a week, he noticed immediately that his urinary stream weakened to a trickle.  He states that shocked him and he was nearly unable to urinate he went back on tamsulosin.    Discussion with the patient, he does report reports he holds his bladder for long periods of time.  Sometimes he loses the sensation of a full bladder.    IPSS score is 15, primary complaint is weak stream, sensation of incomplete emptying, and frequency.    Lab Results   Component Value Date    PSA 1.370 2022    PSA 1.550 2021    PSA 0.710 10/05/2020    PSA 0.80 10/22/2015    PSA 1.30 2015    PSA 0.50 2014     No FH of PCa, PSA trend is normal.     Patient is not emptying the bladder completely, PVR 73 mL.  Urinalysis positive for leukocytes and trace blood.  He has no dysuria or bladder pain  today.    Subjective      Review of System: Review of Systems   Constitutional: Negative for chills, fatigue, fever and unexpected weight change.   HENT: Negative for sore throat.    Eyes: Negative for visual disturbance.   Respiratory: Negative for cough, chest tightness and shortness of breath.    Cardiovascular: Negative for chest pain and leg swelling.   Gastrointestinal: Negative for blood in stool, constipation, diarrhea, nausea, rectal pain and vomiting.   Genitourinary: Positive for frequency and urgency. Negative for decreased urine volume, difficulty urinating, dysuria, enuresis, flank pain, genital sores and hematuria.   Musculoskeletal: Negative for back pain and joint swelling.   Skin: Negative for rash and wound.   Neurological: Negative for seizures, speech difficulty, weakness and headaches.   Psychiatric/Behavioral: Negative for confusion, sleep disturbance and suicidal ideas. The patient is not nervous/anxious.       I have reviewed the ROS documented by my clinical staff, I have updated appropriately and I agree. Torsten Hawley MD    Past Medical History:  Past Medical History:   Diagnosis Date   • Allergies    • Anemia    • Arthritis    • Asthma    • Cataracts, bilateral    • Depression    • Hypertension    • Hypothyroidism    • Kidney stones    • Peripheral neuropathy    • Thyroid disease    • Thyroid disorder        Past Surgical History:  Past Surgical History:   Procedure Laterality Date   • COLON SURGERY     • TONSILLECTOMY     • TONSILLECTOMY         Medications:    Current Outpatient Medications:   •  acetaminophen (TYLENOL) 650 MG 8 hr tablet, Every 6 (Six) Hours., Disp: , Rfl:   •  amoxicillin-clavulanate (Augmentin) 875-125 MG per tablet, Take 1 tablet by mouth Every 12 (Twelve) Hours., Disp: 60 tablet, Rfl: 1  •  B Complex-C (B-complex with vitamin C) tablet, Take 1 tablet by mouth Daily., Disp: 90 tablet, Rfl: 3  •  doxycycline (VIBRAMYCIN) 100 MG capsule, Take 1 capsule by mouth  2 (Two) Times a Day., Disp: 60 capsule, Rfl: 1  •  Fluticasone Furoate-Vilanterol (Breo Ellipta) 100-25 MCG/INH inhaler, Inhale 1 puff Daily., Disp: 3 each, Rfl: 3  •  ipratropium (ATROVENT) 0.06 % nasal spray, 2 sprays into the nostril(s) as directed by provider every night at bedtime., Disp: 15 mL, Rfl: 12  •  levothyroxine (SYNTHROID, LEVOTHROID) 100 MCG tablet, TAKE 1 TABLET BY MOUTH DAILY, Disp: 90 tablet, Rfl: 3  •  Loratadine 10 MG capsule, Take 1 capsule by mouth Daily., Disp: , Rfl:   •  NIFEdipine XL (PROCARDIA XL) 60 MG 24 hr tablet, TAKE 1 TABLET BY MOUTH DAILY, Disp: 90 tablet, Rfl: 3  •  Probiotic Product (PROBIOTIC-10 PO), Probiotic  1 capsule daily, Disp: , Rfl:   •  tamsulosin (FLOMAX) 0.4 MG capsule 24 hr capsule, TAKE 1 CAPSULE BY MOUTH DAILY, Disp: 90 capsule, Rfl: 3  •  vitamin B-12 (CYANOCOBALAMIN) 1000 MCG tablet, Take 1,000 mcg by mouth Daily., Disp: , Rfl:     Allergies:  Allergies   Allergen Reactions   • Oxycodone-Acetaminophen GI Intolerance       Social History:  Social History     Socioeconomic History   • Marital status:    Tobacco Use   • Smoking status: Never Smoker   • Smokeless tobacco: Never Used   Vaping Use   • Vaping Use: Never used   Substance and Sexual Activity   • Alcohol use: Yes     Comment: Rarely   • Drug use: No   • Sexual activity: Not Currently       Family History:  Family History   Problem Relation Age of Onset   • Cancer Other    • Diabetes Other    • Prostate cancer Other    • Leukemia Mother    • Diabetes Father        IPSS Questionnaire (AUA-7):  Over the past month…    1)  Incomplete Emptying:       How often have you had a sensation of not emptying you had the sensation of not emptying your bladder completely after you finished urinating?  3 - About half the time   2)  Frequency:       How often have you had the urinate again less than two hours after you finished urinating?  3 - About half the time   3)  Intermittency:       How often have you found  you stopped and started again several times when you urinated?   2 - Less than half the time   4) Urgency:      How often have you found it difficult to postpone urination?  1 - Less than 1 time in 5   5) Weak Stream:      How often have you had a weak urinary stream?  5 - Almost always   6) Straining:       How often have you had to push or strain to begin urination?  0 - Not at all   7) Nocturia:      How many times did you most typically get up to urinate from the time you went to bed at night until the time you got up in the morning?  1 - 1 time   Total Score:  15   The International Prostate Symptom Score (IPSS) is used to screen, diagnose, track symptoms of benign prostatic hyperplasia (BPH).   0-7 (Mild Symptoms) 8-19 (Moderate) 20-35 (Severe)   Quality of Life (QoL):  If you were to spend the rest of your life with your urinary condition just the way it is now, how would you feel about that? 1-Pleased   Urine Leakage (Incontinence) 1-Mild (A few drops a day, no pad use)       Sexual Health Inventory for Men (WILBERT)   Over the past 6 months:     1. How do you rate your confidence that you could get and keep an erection?  0 - No sexual activity    2. When you had erections with sexual  stimulation, how often were your erections hard enough for penetration (entering your partner)?  0 - No Sexual Activity    3. During sexual intercourse, how often were you able to maintain your erection after you had penetrated (entered) your partner?  0 - Did not attempt intercourse   4. During sexual intercourse, how difficult was it to maintain your erection to completion of intercourse?  0 - Did not attempt intercourse   5. When you attempted sexual intercourse, how often was it satisfactory for you?  0 - Did not attempt intercourse    Total Score: 0   The Sexual Health Inventory for Men further classifies ED severity with the following breakpoints:   1-7 (Severe ED) 8-11 (Moderate ED) 12-16 (Mild to Moderate ED) 17-21 (Mild  "ED)      Post void residual bladder scan:   73 mL     Objective     Physical Exam:   Vital Signs:   Vitals:    06/23/22 1121   BP: 136/78   Pulse: 87   SpO2: 96%   Weight: 97.5 kg (215 lb)   Height: 177.8 cm (70\")     Body mass index is 30.85 kg/m².     Physical Exam  Constitutional:       Appearance: Normal appearance.   HENT:      Head: Normocephalic and atraumatic.      Nose: Nose normal.   Eyes:      Extraocular Movements: Extraocular movements intact.      Conjunctiva/sclera: Conjunctivae normal.      Pupils: Pupils are equal, round, and reactive to light.   Musculoskeletal:         General: Normal range of motion.      Cervical back: Normal range of motion and neck supple.   Skin:     General: Skin is warm and dry.      Findings: No lesion or rash.   Neurological:      General: No focal deficit present.      Mental Status: He is alert and oriented to person, place, and time. Mental status is at baseline.   Psychiatric:         Mood and Affect: Mood normal.         Behavior: Behavior normal.         Labs:   Lab Results   Component Value Date    PSA 1.370 05/09/2022    PSA 1.550 06/30/2021    PSA 0.710 10/05/2020       Brief Urine Lab Results  (Last result in the past 365 days)      Color   Clarity   Blood   Leuk Est   Nitrite   Protein   CREAT   Urine HCG        06/23/22 1146 Yellow   Clear   3+   Large (3+)   Negative   Trace                      Lab Results   Component Value Date    GLUCOSE 99 05/09/2022    CALCIUM 9.5 05/09/2022     05/09/2022    K 4.3 05/09/2022    CO2 23.4 05/09/2022     05/09/2022    BUN 16 05/09/2022    CREATININE 1.09 05/09/2022    EGFRIFAFRI 74 11/09/2020    EGFRIFNONA 62 09/07/2021    BCR 14.7 05/09/2022    ANIONGAP 10.6 05/09/2022       Lab Results   Component Value Date    WBC 7.04 05/09/2022    HGB 16.9 05/09/2022    HCT 48.8 05/09/2022    MCV 85.9 05/09/2022     05/09/2022       Images:   No Images in the past 120 days found..    Measures:   Tobacco:   Cipriano " Tye Montes  reports that he has never smoked. He has never used smokeless tobacco..          Assessment / Plan      Assessment:  Mr. Montes is a 82 y.o. male who presented today with lower urinary tract symptoms.  He has had progressive symptoms for the last many years, he has been on tamsulosin monotherapy for about 15 years plus.  He is going to be meeting with an ophthalmologist in July to discuss possible cataract surgery.  He is curious about timeline for discontinuing tamsulosin.  He stated he experimented off of tamsulosin recently and was nearly unable to urinate.  I discussed he should continue tamsulosin to prevent urinary retention and talk with his ophthalmologist about timeline for discontinuation of his alpha-blocker.  However given his progressive symptoms I do recommend a complete evaluation with flexible cystoscopy and transrectal ultrasound for prostate sizing.  We discussed empiric treatment with addition of finasteride which would be considered maximal medical therapy.  We discussed doing nothing and continuing tamsulosin and working with his ophthalmologist to find a better solution.  We discussed that alfuzosin, another selective alpha-blocker can also cause intraoperative floppy iris syndrome and there is no other medication I would recommend at this time besides finasteride.  Work-up will give us value information about his anatomy, degree of obstruction, size of prostate which would indicate the potential surgical options available to the patient which may be able to get him off tamsulosin indefinitely.  He is interested in moving forward with work-up.      Diagnoses and all orders for this visit:    1. BPH with obstruction/lower urinary tract symptoms    -Patient has progressive BPH with lower urinary tract symptoms, after discussion of risk, benefits, alternatives he elects to proceed with flexible cystoscopy and transrectal ultrasound for prostate sizing  -He may be interested in a more  definitive approach rather than continue tamsulosin or medication treatment  -He may even need to come off tamsulosin soon for ophthalmology assessment for cataracts, this may place the patient at significant risk of urinary retention  -Never smoker, blood on dipstick urinalysis today, we will assess with flexible cystoscopy, I suspect this is likely related to chronic incomplete bladder emptying and smoldering infection/inflammation    -     POC Urinalysis Dipstick, Automated         Follow Up:   Return in about 3 weeks (around 7/14/2022), or Flex cysto and TRUS prostate sizing, for Flex cysto and TRUS prostate sizing .    I spent approximately 45 minutes providing clinical care for this patient; including review of patient's chart and provider documentation, face to face time spent with patient in examination room (obtaining history, performing physical exam, discussing diagnosis and management options), placing orders, and completing patient documentation.     Torsten Hawley MD  [facility] [place]

## 2022-08-02 ENCOUNTER — PROCEDURE VISIT (OUTPATIENT)
Dept: UROLOGY | Facility: CLINIC | Age: 82
End: 2022-08-02

## 2022-08-02 VITALS — WEIGHT: 215 LBS | HEIGHT: 70 IN | BODY MASS INDEX: 30.78 KG/M2

## 2022-08-02 DIAGNOSIS — N40.1 BPH WITH OBSTRUCTION/LOWER URINARY TRACT SYMPTOMS: Primary | ICD-10-CM

## 2022-08-02 DIAGNOSIS — N13.8 BPH WITH OBSTRUCTION/LOWER URINARY TRACT SYMPTOMS: Primary | ICD-10-CM

## 2022-08-02 LAB
BILIRUB BLD-MCNC: NEGATIVE MG/DL
CLARITY, POC: CLEAR
COLOR UR: YELLOW
EXPIRATION DATE: ABNORMAL
GLUCOSE UR STRIP-MCNC: NEGATIVE MG/DL
KETONES UR QL: NEGATIVE
LEUKOCYTE EST, POC: ABNORMAL
Lab: ABNORMAL
NITRITE UR-MCNC: NEGATIVE MG/ML
PH UR: 6 [PH] (ref 5–8)
PROT UR STRIP-MCNC: ABNORMAL MG/DL
RBC # UR STRIP: NEGATIVE /UL
SP GR UR: 1.03 (ref 1–1.03)
UROBILINOGEN UR QL: NORMAL

## 2022-08-02 PROCEDURE — 81003 URINALYSIS AUTO W/O SCOPE: CPT | Performed by: STUDENT IN AN ORGANIZED HEALTH CARE EDUCATION/TRAINING PROGRAM

## 2022-08-02 PROCEDURE — 52000 CYSTOURETHROSCOPY: CPT | Performed by: STUDENT IN AN ORGANIZED HEALTH CARE EDUCATION/TRAINING PROGRAM

## 2022-08-02 PROCEDURE — 76872 US TRANSRECTAL: CPT | Performed by: STUDENT IN AN ORGANIZED HEALTH CARE EDUCATION/TRAINING PROGRAM

## 2022-08-02 NOTE — PROGRESS NOTES
Preprocedure diagnosis  BPH with LUTS    Postprocedure diagnosis  BPH with LUTS    Procedure  Flexible Cystourethroscopy    Attending surgeon  Torsten Hawley MD    Anesthesia  2% lidocaine jelly intraurethrally    Complications  None    Indications  82 y.o. male undergoing a flexible cystoscopy for the above mentioned indications.  Informed consent was obtained.      Findings  The urethral urothelium was within normal limits with no visible strictures.      The prostatic urethra revealed significant lateral lobe coaptation, with no median lobe.     Bladder findings included bilateral ureters in orthotopic position, normal bladder mucosa without tumors, lesions, or stones.     She has a fairly sizable wide necked bladder diverticulum at the anterior bladder neck consistent with chronic bladder outlet obstruction.    Procedure  The patient was placed in supine position and prepped and draped in sterile fashion with lidocaine jelly instill per the urethra for anesthesia 5 minutes prior to procedure start.  A brief timeout was performed including available nursing staff and the patient.      The 16 Fr digital flexible cystoscope was lubricated and gently advanced into the urethral meatus.     The penile and bulbar urethra appeared widely patent without visible lesion or stricture.     The prostatic urethra was notable for significant lateral lobe coaptation, with no median lobe component.      The scope was then advanced into the bladder. The bladder was completely visualized starting with the trigone.     There were bilateral orthotopic ureteral orifices.     The posterior wall, lateral walls, anterior wall, and dome were completely visualized.    The patient had a large bladder diverticulum along the anterior bladder wall consistent with chronic bladder outlet obstruction.    The cystoscope was retroflexed and the bladder neck and prostate were further visualized and appeared normal.      The cystoscope was then  gently withdrawn while visualizing the urethra and the procedure was then terminated.  The patient tolerated the procedure well.         =======================================    Preprocedure diagnosis  BPH with LUTS    Postprocedure diagnosis  BPH with LUTS    Procedure  Transrectal ultrasound-guided prostate sizing     Attending surgeon  Torsten Hawley MD    Anesthesia  None    Complications  None    Indications  82 y.o. male who has a history of BPH with LUTS presents for prostate sizing for possible surgical planning.     Findings  -Digital rectal examination = enlarged prostate, no induration or nodules  -Prostate volume measurements = 56 cc volume     Lab Results   Component Value Date    PSA 1.370 05/09/2022    PSA 1.550 06/30/2021    PSA 0.710 10/05/2020    PSA 0.80 10/22/2015    PSA 1.30 03/27/2015    PSA 0.50 05/14/2014         Procedure   The patient was positioned and prepped in a left lateral position with lower extremities flexed.       A digital rectal exam was performed identifying a enlarged prostate without nodularity or induration.     The rectal ultrasound probe was slowly introduced into the rectum without difficulty.      The prostate and seminal vesicles were inspected systematically using axial and sagittal views with the ultrasound.      The dimensions of the prostate were measured, for a calculated volume of 56 mL, PSA Density  0.02.      The rectal ultrasound probe was removed.      The patient tolerated the procedure well.     Disposition/Follow Up:     I had a long discussion with the patient regarding findings today, he has significantly large lateral lobe hyperplasia with coaptation the midline, no significant median lobe and a prostate measured at 56 cc.  This is enlarged.  He is getting some relief with tamsulosin but is interested in a more definitive long-term approach.  He is having cataract surgery in late September and he is interested in coming off the tamsulosin due to  the risk of intraoperative floppy iris syndrome.  I discussed with patient options including UroLift, intermittent catheterization, greenlight photo vaporization of prostate or TURP.  Patient would like to move forward with UroLift after discussion risk benefits alternatives.    Plan  - Cystoscopy, UroLift placement 8-    Torsten Hawley MD  [facility]     Torsten Hawley MD

## 2022-08-03 DIAGNOSIS — N40.1 BPH WITH OBSTRUCTION/LOWER URINARY TRACT SYMPTOMS: Primary | ICD-10-CM

## 2022-08-03 DIAGNOSIS — N13.8 BPH WITH OBSTRUCTION/LOWER URINARY TRACT SYMPTOMS: Primary | ICD-10-CM

## 2022-08-08 ENCOUNTER — TELEPHONE (OUTPATIENT)
Dept: UROLOGY | Facility: CLINIC | Age: 82
End: 2022-08-08

## 2022-08-08 NOTE — TELEPHONE ENCOUNTER
[facility] WITH [facility] REGARDING PATIENT AND PRE AUTHORIZATION FOR OUTPATIENT SURGERY WITH [facility].    THEY NEED CLINICAL INFORMATION TO FULFILL THE PRIOR AUTHORIZATION.    PLEASE FAX TO:  [phone]  REFERENCE ID: [id number]

## 2022-08-12 ENCOUNTER — OFFICE VISIT (OUTPATIENT)
Dept: PULMONOLOGY | Facility: CLINIC | Age: 82
End: 2022-08-12

## 2022-08-12 VITALS
HEART RATE: 83 BPM | TEMPERATURE: 97 F | SYSTOLIC BLOOD PRESSURE: 140 MMHG | BODY MASS INDEX: 31.07 KG/M2 | WEIGHT: 217 LBS | RESPIRATION RATE: 14 BRPM | OXYGEN SATURATION: 97 % | DIASTOLIC BLOOD PRESSURE: 86 MMHG | HEIGHT: 70 IN

## 2022-08-12 DIAGNOSIS — J45.40 MODERATE PERSISTENT ASTHMA WITHOUT COMPLICATION: Primary | ICD-10-CM

## 2022-08-12 DIAGNOSIS — J30.9 ALLERGIC RHINITIS, UNSPECIFIED SEASONALITY, UNSPECIFIED TRIGGER: ICD-10-CM

## 2022-08-12 PROCEDURE — 99213 OFFICE O/P EST LOW 20 MIN: CPT | Performed by: NURSE PRACTITIONER

## 2022-08-12 RX ORDER — BUDESONIDE AND FORMOTEROL FUMARATE DIHYDRATE 160; 4.5 UG/1; UG/1
2 AEROSOL RESPIRATORY (INHALATION) 2 TIMES DAILY
Qty: 3 EACH | Refills: 3 | Status: SHIPPED | OUTPATIENT
Start: 2022-08-12 | End: 2022-08-17 | Stop reason: ALTCHOICE

## 2022-08-12 NOTE — PROGRESS NOTES
Follow Up Office Visit      Patient Name: Cipriano Montes    Chief Complaint:    Chief Complaint   Patient presents with   • Asthma     Followup, breo refill       History of Present Illness: Cipriano Montes is a 82 y.o. male who is here today for follow up of asthma. He was last see in clinic in May 2021. Since last visit, he notes that symptoms have been well controlled with use of Breo. No exacerbations. No lifestyle limiting dyspnea. No current cough or wheezing. No fevers, no hemoptysis, no unintended weight loss.    Subjective      Review of Systems:  Review of Systems   Constitutional: Negative for fever and unexpected weight change.   Respiratory: Negative for cough, shortness of breath and wheezing.    Cardiovascular: Negative for chest pain and leg swelling.   Allergic/Immunologic: Positive for environmental allergies.        Past Medical History:   Past Medical History:   Diagnosis Date   • Allergies    • Anemia    • Arthritis    • Asthma    • Cataracts, bilateral    • Depression    • Hypertension    • Hypothyroidism    • Kidney stones    • Peripheral neuropathy    • Thyroid disease    • Thyroid disorder        Past Surgical History:   Past Surgical History:   Procedure Laterality Date   • COLON SURGERY     • TONSILLECTOMY     • TONSILLECTOMY         Family History:   Family History   Problem Relation Age of Onset   • Cancer Other    • Diabetes Other    • Prostate cancer Other    • Leukemia Mother    • Diabetes Father        Social History:   Social History     Socioeconomic History   • Marital status:    Tobacco Use   • Smoking status: Never Smoker   • Smokeless tobacco: Never Used   Vaping Use   • Vaping Use: Never used   Substance and Sexual Activity   • Alcohol use: Yes     Comment: Rarely   • Drug use: No   • Sexual activity: Not Currently       Current Medications:     Current Outpatient Medications:   •  acetaminophen (TYLENOL) 650 MG 8 hr tablet, Every 6 (Six) Hours., Disp: ,  "Rfl:   •  B Complex-C (B-complex with vitamin C) tablet, Take 1 tablet by mouth Daily., Disp: 90 tablet, Rfl: 3  •  levothyroxine (SYNTHROID, LEVOTHROID) 100 MCG tablet, TAKE 1 TABLET BY MOUTH DAILY, Disp: 90 tablet, Rfl: 3  •  Loratadine 10 MG capsule, Take 1 capsule by mouth Daily., Disp: , Rfl:   •  tamsulosin (FLOMAX) 0.4 MG capsule 24 hr capsule, TAKE 1 CAPSULE BY MOUTH DAILY, Disp: 90 capsule, Rfl: 3  •  vitamin B-12 (CYANOCOBALAMIN) 1000 MCG tablet, Take 1,000 mcg by mouth Daily., Disp: , Rfl:   •  Breo 100 mcg  •  ipratropium (ATROVENT) 0.06 % nasal spray, 2 sprays into the nostril(s) as directed by provider every night at bedtime., Disp: 15 mL, Rfl: 12  •  NIFEdipine XL (PROCARDIA XL) 60 MG 24 hr tablet, TAKE 1 TABLET BY MOUTH DAILY, Disp: 90 tablet, Rfl: 3  •  Probiotic Product (PROBIOTIC-10 PO), Probiotic  1 capsule daily, Disp: , Rfl:      Allergies:   Allergies   Allergen Reactions   • Oxycodone-Acetaminophen GI Intolerance       Objective     Physical Exam:  Vital Signs:   Vitals:    08/12/22 1439   BP: 140/86   Pulse: 83   Resp: 14   Temp: 97 °F (36.1 °C)   SpO2: 97%   Weight: 98.4 kg (217 lb)   Height: 177.8 cm (70\")     Body mass index is 31.14 kg/m².    Physical Exam  Constitutional:       General: He is not in acute distress.     Appearance: He is not toxic-appearing.   HENT:      Head: Normocephalic and atraumatic.   Eyes:      Extraocular Movements: Extraocular movements intact.   Cardiovascular:      Rate and Rhythm: Normal rate and regular rhythm.      Heart sounds: Normal heart sounds.   Pulmonary:      Effort: Pulmonary effort is normal.      Breath sounds: Normal breath sounds.   Abdominal:      General: There is no distension.   Musculoskeletal:         General: No swelling.      Cervical back: Neck supple.   Skin:     General: Skin is warm and dry.      Findings: No rash.   Neurological:      General: No focal deficit present.      Mental Status: He is alert and oriented to person, place, " and time.   Psychiatric:         Mood and Affect: Mood normal.         Behavior: Behavior normal.       Regional Allergen Zone 8 / With IgE (Order #[id number]) on 5/14/21    WBC   Date Value Ref Range Status   05/09/2022 7.04 3.40 - 10.80 10*3/mm3 Final     RBC   Date Value Ref Range Status   05/09/2022 5.68 4.14 - 5.80 10*6/mm3 Final     Hemoglobin   Date Value Ref Range Status   05/09/2022 16.9 13.0 - 17.7 g/dL Final     Hematocrit   Date Value Ref Range Status   05/09/2022 48.8 37.5 - 51.0 % Final     MCV   Date Value Ref Range Status   05/09/2022 85.9 79.0 - 97.0 fL Final     MCH   Date Value Ref Range Status   05/09/2022 29.8 26.6 - 33.0 pg Final     MCHC   Date Value Ref Range Status   05/09/2022 34.6 31.5 - 35.7 g/dL Final     RDW   Date Value Ref Range Status   05/09/2022 13.5 12.3 - 15.4 % Final     RDW-SD   Date Value Ref Range Status   05/09/2022 41.8 37.0 - 54.0 fl Final     MPV   Date Value Ref Range Status   05/09/2022 9.6 6.0 - 12.0 fL Final     Platelets   Date Value Ref Range Status   05/09/2022 248 140 - 450 10*3/mm3 Final     Neutrophil %   Date Value Ref Range Status   05/09/2022 64.1 42.7 - 76.0 % Final     Lymphocyte %   Date Value Ref Range Status   05/09/2022 22.4 19.6 - 45.3 % Final     Monocyte %   Date Value Ref Range Status   05/09/2022 8.1 5.0 - 12.0 % Final     Eosinophil %   Date Value Ref Range Status   05/09/2022 4.1 0.3 - 6.2 % Final     Basophil %   Date Value Ref Range Status   05/09/2022 1.0 0.0 - 1.5 % Final     Immature Grans %   Date Value Ref Range Status   05/09/2022 0.3 0.0 - 0.5 % Final     Neutrophils, Absolute   Date Value Ref Range Status   05/09/2022 4.51 1.70 - 7.00 10*3/mm3 Final     Lymphocytes, Absolute   Date Value Ref Range Status   05/09/2022 1.58 0.70 - 3.10 10*3/mm3 Final     Monocytes, Absolute   Date Value Ref Range Status   05/09/2022 0.57 0.10 - 0.90 10*3/mm3 Final     Eosinophils, Absolute   Date Value Ref Range Status   05/09/2022 0.29 0.00 - 0.40  10*3/mm3 Final     Basophils, Absolute   Date Value Ref Range Status   05/09/2022 0.07 0.00 - 0.20 10*3/mm3 Final     Immature Grans, Absolute   Date Value Ref Range Status   05/09/2022 0.02 0.00 - 0.05 10*3/mm3 Final     nRBC   Date Value Ref Range Status   05/09/2022 0.0 0.0 - 0.2 /100 WBC Final       Assessment / Plan      Assessment/Plan:   Diagnoses and all orders for this visit:    1. Moderate persistent asthma without complication (Primary)  -     Discontinue: budesonide-formoterol (SYMBICORT) 160-4.5 MCG/ACT inhaler; Inhale 2 puffs 2 (Two) Times a Day. Rinse mouth out after use  Dispense: 3 each; Refill: 3  Has been doing well on ICS/LABA. Stop Breo and try changing to Symbicort based on cost/formulary. We discussed the risk and benefits of inhaled corticosteroids. Patient instructed to take them on a regular basis as prescribed. Patient instructed to rinse their mouth out after each use. Patient instructed to contact their insurance company and make sure that the medications prescribed are on their formulary and the lowest cost/tier for them. They will call the clinic back if different medications need to prescribed. Can refer to the [facility] for patient assistance if cost is prohibitive.    2. Allergic rhinitis, unspecified seasonality, unspecified trigger  Continue use of oral antihistamine. Can add OTC Flonase if needed.     Follow Up:   Return in about 1 year (around 8/12/2023) for Recheck.  The patient was counseled on diagnostic results, risks and benefits of treatment options, risk factor modifications and the importance of treatment compliance. The patient was advised to contact the clinic with concerns or worsening symptoms.     LENNY Woody   [facility]     Please note that portions of this note may have been completed with a voice recognition program. Efforts were made to edit the dictations, but occasionally words are mistranscribed

## 2022-08-16 ENCOUNTER — TELEPHONE (OUTPATIENT)
Dept: UROLOGY | Facility: CLINIC | Age: 82
End: 2022-08-16

## 2022-08-17 DIAGNOSIS — J45.40 MODERATE PERSISTENT ASTHMA WITHOUT COMPLICATION: Primary | ICD-10-CM

## 2022-08-21 ENCOUNTER — APPOINTMENT (OUTPATIENT)
Dept: PREADMISSION TESTING | Facility: HOSPITAL | Age: 82
End: 2022-08-21

## 2022-08-24 ENCOUNTER — TELEPHONE (OUTPATIENT)
Dept: UROLOGY | Facility: CLINIC | Age: 82
End: 2022-08-24

## 2022-08-24 ENCOUNTER — OUTSIDE FACILITY SERVICE (OUTPATIENT)
Dept: UROLOGY | Facility: CLINIC | Age: 82
End: 2022-08-24

## 2022-08-24 DIAGNOSIS — N40.1 BPH WITH OBSTRUCTION/LOWER URINARY TRACT SYMPTOMS: Primary | ICD-10-CM

## 2022-08-24 DIAGNOSIS — N13.8 BPH WITH OBSTRUCTION/LOWER URINARY TRACT SYMPTOMS: Primary | ICD-10-CM

## 2022-08-24 PROCEDURE — 52441 CYSTO INSJ TRNSPRSTC 1 IMPLT: CPT | Performed by: STUDENT IN AN ORGANIZED HEALTH CARE EDUCATION/TRAINING PROGRAM

## 2022-08-24 PROCEDURE — 52442 CYSTO INS TRNSPRSTC IMPLT EA: CPT | Performed by: STUDENT IN AN ORGANIZED HEALTH CARE EDUCATION/TRAINING PROGRAM

## 2022-08-24 RX ORDER — PHENAZOPYRIDINE HYDROCHLORIDE 200 MG/1
200 TABLET, FILM COATED ORAL 3 TIMES DAILY PRN
Qty: 20 TABLET | Refills: 0 | Status: SHIPPED | OUTPATIENT
Start: 2022-08-24 | End: 2022-09-01

## 2022-08-24 RX ORDER — SULFAMETHOXAZOLE AND TRIMETHOPRIM 800; 160 MG/1; MG/1
1 TABLET ORAL 2 TIMES DAILY
Qty: 6 TABLET | Refills: 0 | Status: SHIPPED | OUTPATIENT
Start: 2022-08-24 | End: 2022-09-01

## 2022-08-24 RX ORDER — HYOSCYAMINE SULFATE 0.12 MG/1
0.12 TABLET SUBLINGUAL EVERY 4 HOURS PRN
Qty: 30 EACH | Refills: 1 | Status: SHIPPED | OUTPATIENT
Start: 2022-08-24 | End: 2022-09-01

## 2022-08-24 NOTE — TELEPHONE ENCOUNTER
Patient had a urolift placement today, pt states he has the urge to void but has not been able to void.     Dr. Hawley, please advise.  Thank you.

## 2022-08-24 NOTE — TELEPHONE ENCOUNTER
S/p urolift placement today (6 juan placement).     Needs to see me back in clinic in 4 weeks thanks    Torsten Hawley MD

## 2022-08-26 ENCOUNTER — CLINICAL SUPPORT (OUTPATIENT)
Dept: UROLOGY | Facility: CLINIC | Age: 82
End: 2022-08-26

## 2022-08-26 VITALS — WEIGHT: 217 LBS | BODY MASS INDEX: 31.07 KG/M2 | HEIGHT: 70 IN

## 2022-08-26 DIAGNOSIS — N40.1 BENIGN PROSTATIC HYPERPLASIA WITH URINARY OBSTRUCTION: ICD-10-CM

## 2022-08-26 DIAGNOSIS — N13.8 BENIGN PROSTATIC HYPERPLASIA WITH URINARY OBSTRUCTION: ICD-10-CM

## 2022-08-26 PROCEDURE — 51702 INSERT TEMP BLADDER CATH: CPT | Performed by: STUDENT IN AN ORGANIZED HEALTH CARE EDUCATION/TRAINING PROGRAM

## 2022-08-26 NOTE — PROGRESS NOTES
08/26/22  MB    -Hoffman cath removed per order.    -orange color urine in catheter bag.  -pt taking pyridium  -filled bladder with 250 mL's of sterile water.  -Withdrew 10 cc of water from balloon  -Catheter removed without difficulty; catheter tip intact.  -pt tolerated well   -provided patient with 2 cups of water to drink  -pt void 100 mL's of urine     -educated pt on intermittent self cath  -provided 7 catheters (14) in order to use if needed today.    -ask pt to contact us if needed, if pt is unable to urinate and is experiencing pain to go to ER.  Patient voiced understanding.

## 2022-08-29 ENCOUNTER — TELEPHONE (OUTPATIENT)
Dept: UROLOGY | Facility: CLINIC | Age: 82
End: 2022-08-29

## 2022-08-29 NOTE — TELEPHONE ENCOUNTER
Caller:  VINAY HULL    Relationship: SELF     Best call back number: [phone]    What is your medical concern? PT CALLED TO SPEAK WITH DR. WEINER OR STAFF OF HIS MEDICAL TEAM. STATES HE IS HAVING ISSUES LISTED BELOW AND IS WORRIED IT'S NOT NORMAL.     -URGE TO URINATE BUT CAN'T-BLADDER SPASMS    -NOT FULLY EMPTYING HIS BLADDER-MAYBE 3-4 OZS EACH TIME AND NO MORE.    -NO STEADY URINE STREAM    -WANTS TO KNOW WHEN HE'S ABLE TO RESUME PHYSICAL ACTIVITIES SUCH AS WALKING ON TREADMILL.    -PILL GIVEN FOR BLADDER SPASMS SEEM TO ONLY LAST FOUR HOURS.

## 2022-08-31 ENCOUNTER — TELEPHONE (OUTPATIENT)
Dept: UROLOGY | Facility: CLINIC | Age: 82
End: 2022-08-31

## 2022-08-31 NOTE — TELEPHONE ENCOUNTER
Patient had appt scheduled for tomorrow due to complications after surgery but called and said his symptoms have greatly improved and wants to cancel appt for tomorrow and just keep his original follow up for 09/23. I cancelled appt for tomorrow.

## 2022-09-01 ENCOUNTER — OFFICE VISIT (OUTPATIENT)
Dept: UROLOGY | Facility: CLINIC | Age: 82
End: 2022-09-01

## 2022-09-01 VITALS — HEIGHT: 70 IN | BODY MASS INDEX: 31.07 KG/M2 | OXYGEN SATURATION: 100 % | WEIGHT: 217 LBS | HEART RATE: 97 BPM

## 2022-09-01 DIAGNOSIS — N30.00 ACUTE CYSTITIS WITHOUT HEMATURIA: ICD-10-CM

## 2022-09-01 DIAGNOSIS — N13.8 BENIGN PROSTATIC HYPERPLASIA WITH URINARY OBSTRUCTION: Primary | ICD-10-CM

## 2022-09-01 DIAGNOSIS — N40.1 BENIGN PROSTATIC HYPERPLASIA WITH URINARY OBSTRUCTION: Primary | ICD-10-CM

## 2022-09-01 LAB
BILIRUB BLD-MCNC: ABNORMAL MG/DL
CLARITY, POC: CLEAR
COLOR UR: ABNORMAL
EXPIRATION DATE: ABNORMAL
GLUCOSE UR STRIP-MCNC: NEGATIVE MG/DL
KETONES UR QL: NEGATIVE
LEUKOCYTE EST, POC: ABNORMAL
Lab: ABNORMAL
NITRITE UR-MCNC: POSITIVE MG/ML
PH UR: 5.5 [PH] (ref 5–8)
PROT UR STRIP-MCNC: ABNORMAL MG/DL
RBC # UR STRIP: ABNORMAL /UL
SP GR UR: 1.02 (ref 1–1.03)
UROBILINOGEN UR QL: NORMAL

## 2022-09-01 PROCEDURE — 99214 OFFICE O/P EST MOD 30 MIN: CPT | Performed by: STUDENT IN AN ORGANIZED HEALTH CARE EDUCATION/TRAINING PROGRAM

## 2022-09-01 PROCEDURE — 51798 US URINE CAPACITY MEASURE: CPT | Performed by: STUDENT IN AN ORGANIZED HEALTH CARE EDUCATION/TRAINING PROGRAM

## 2022-09-01 PROCEDURE — 87086 URINE CULTURE/COLONY COUNT: CPT | Performed by: STUDENT IN AN ORGANIZED HEALTH CARE EDUCATION/TRAINING PROGRAM

## 2022-09-01 PROCEDURE — 81003 URINALYSIS AUTO W/O SCOPE: CPT | Performed by: STUDENT IN AN ORGANIZED HEALTH CARE EDUCATION/TRAINING PROGRAM

## 2022-09-01 RX ORDER — CEFUROXIME AXETIL 500 MG/1
500 TABLET ORAL 2 TIMES DAILY
Qty: 14 TABLET | Refills: 0 | Status: SHIPPED | OUTPATIENT
Start: 2022-09-01 | End: 2022-09-16

## 2022-09-01 NOTE — PROGRESS NOTES
Follow Up Office Visit      Patient Name: Cipriano Montes  : 1940   MRN: [id number]     Chief Complaint:    Chief Complaint   Patient presents with   • Benign Prostatic Hypertrophy       Referring Provider: No ref. provider found    History of Present Illness: Cipriano Montes is a 82 y.o. male who presents today for follow up of refractory BPH symptoms.  Previously managed with long-term tamsulosin but given need for cataract surgery this  he desire to stop the tamsulosin.  He was taken to the procedure room for cystoscopy and transrectal ultrasound, patient was found to have severe lateral lobe hyperplasia, no significant median lobe and a 56 cc volume prostate with no concerns for malignancy.  PSA trend is normal.    Patient elected to proceed with UroLift placement, this was performed on 2022.  Right after surgery, patient was able to urinate but then developed urinary retention at home, he presented to my clinic emergently on 8:24 PM and a Hoffman catheter was placed.  This was left in place for 48 hours, he return to clinic on 2022 and passed his trial of void.  He was given intermittent catheters for retention at home.  Patient states he was catheterized once and had fairly good amount of urine come out.  For the last few days, patient reports his urine stream actually significantly improved, stated he urinated 2 or 3 times with a very strong stream and felt like he voided to completion.    Despite this he says his urine stream is gotten weaker over the last 48 hours and he states he is only putting out 3 to 4 ounces of urine at a time.    PVR today 100 mL.    Urinalysis nitrate positive, consistent with possible urinary tract infection.      He continues to take tamsulosin but has stopped all of his other postoperative medications.  He desires to cancel his upcoming cataract surgery in late September/early October given his continued urinary issues which may require further  work-up.    IPSS 19, severe symptoms.      Subjective      Review of System: Review of Systems   Constitutional: Negative for chills, fatigue, fever and unexpected weight change.   HENT: Negative for sore throat.    Eyes: Negative for visual disturbance.   Respiratory: Negative for cough, chest tightness and shortness of breath.    Cardiovascular: Negative for chest pain and leg swelling.   Gastrointestinal: Negative for blood in stool, constipation, diarrhea, nausea, rectal pain and vomiting.   Genitourinary: Positive for difficulty urinating, frequency and urgency. Negative for decreased urine volume, dysuria, enuresis, flank pain, genital sores and hematuria.   Musculoskeletal: Negative for back pain and joint swelling.   Skin: Negative for rash and wound.   Neurological: Negative for seizures, speech difficulty, weakness and headaches.   Psychiatric/Behavioral: Negative for confusion, sleep disturbance and suicidal ideas. The patient is not nervous/anxious.       I have reviewed the ROS documented by my clinical staff, I have updated appropriately and I agree. Torsten Hawley MD    I have reviewed and the following portions of the patient's history were updated as appropriate: past family history, past medical history, past social history, past surgical history and problem list.    Medications:     Current Outpatient Medications:   •  acetaminophen (TYLENOL) 650 MG 8 hr tablet, Every 6 (Six) Hours., Disp: , Rfl:   •  B Complex-C (B-complex with vitamin C) tablet, Take 1 tablet by mouth Daily., Disp: 90 tablet, Rfl: 3  •  Fluticasone Furoate-Vilanterol (Breo Ellipta) 100-25 MCG/INH inhaler, Inhale 1 puff Daily., Disp: 60 each, Rfl: 3  •  ipratropium (ATROVENT) 0.06 % nasal spray, 2 sprays into the nostril(s) as directed by provider every night at bedtime., Disp: 15 mL, Rfl: 12  •  levothyroxine (SYNTHROID, LEVOTHROID) 100 MCG tablet, TAKE 1 TABLET BY MOUTH DAILY, Disp: 90 tablet, Rfl: 3  •  Loratadine 10 MG  capsule, Take 1 capsule by mouth Daily., Disp: , Rfl:   •  NIFEdipine XL (PROCARDIA XL) 60 MG 24 hr tablet, TAKE 1 TABLET BY MOUTH DAILY, Disp: 90 tablet, Rfl: 3  •  Probiotic Product (PROBIOTIC-10 PO), Probiotic  1 capsule daily, Disp: , Rfl:   •  tamsulosin (FLOMAX) 0.4 MG capsule 24 hr capsule, TAKE 1 CAPSULE BY MOUTH DAILY, Disp: 90 capsule, Rfl: 3  •  vitamin B-12 (CYANOCOBALAMIN) 1000 MCG tablet, Take 1,000 mcg by mouth Daily., Disp: , Rfl:   •  cefuroxime (CEFTIN) 500 MG tablet, Take 1 tablet by mouth 2 (Two) Times a Day., Disp: 14 tablet, Rfl: 0    Allergies:   Allergies   Allergen Reactions   • Oxycodone-Acetaminophen GI Intolerance       IPSS Questionnaire (AUA-7):  Over the past month…    1)  Incomplete Emptying:       How often have you had a sensation of not emptying you had the sensation of not emptying your bladder completely after you finished urinating?  5 - Almost always   2)  Frequency:       How often have you had the urinate again less than two hours after you finished urinating?  3 - About half the time   3)  Intermittency:       How often have you found you stopped and started again several times when you urinated?   2 - Less than half the time   4) Urgency:      How often have you found it difficult to postpone urination?  3 - About half the time   5) Weak Stream:      How often have you had a weak urinary stream?  5 - Almost always   6) Straining:       How often have you had to push or strain to begin urination?  0 - Not at all   7) Nocturia:      How many times did you most typically get up to urinate from the time you went to bed at night until the time you got up in the morning?  1 - 1 time   Total Score:  19   The International Prostate Symptom Score (IPSS) is used to screen, diagnose, track symptoms of benign prostatic hyperplasia (BPH).   0-7 (Mild Symptoms) 8-19 (Moderate) 20-35 (Severe)   Quality of Life (QoL):  If you were to spend the rest of your life with your urinary condition  "just the way it is now, how would you feel about that? 6-Terrible   Urine Leakage (Incontinence) 1-Mild (A few drops a day, no pad use)        Post void residual bladder scan:   103mL    Objective     Physical Exam:   Vital Signs:   Vitals:    09/01/22 1449   Pulse: 97   SpO2: 100%   Weight: 98.4 kg (217 lb)   Height: 177.8 cm (70\")   PainSc: 0-No pain     Body mass index is 31.14 kg/m².     Physical Exam  Constitutional:       Appearance: Normal appearance.   HENT:      Head: Normocephalic and atraumatic.      Nose: Nose normal.   Eyes:      Extraocular Movements: Extraocular movements intact.      Conjunctiva/sclera: Conjunctivae normal.      Pupils: Pupils are equal, round, and reactive to light.   Musculoskeletal:         General: Normal range of motion.      Cervical back: Normal range of motion and neck supple.   Skin:     General: Skin is warm and dry.      Findings: No lesion or rash.   Neurological:      General: No focal deficit present.      Mental Status: He is alert and oriented to person, place, and time. Mental status is at baseline.   Psychiatric:         Mood and Affect: Mood normal.         Behavior: Behavior normal.         Labs:   Brief Urine Lab Results  (Last result in the past 365 days)      Color   Clarity   Blood   Leuk Est   Nitrite   Protein   CREAT   Urine HCG        09/01/22 1452 [place]   Clear   3+   Small (1+)   Positive   Trace                      Lab Results   Component Value Date    GLUCOSE 99 05/09/2022    CALCIUM 9.5 05/09/2022     05/09/2022    K 4.3 05/09/2022    CO2 23.4 05/09/2022     05/09/2022    BUN 16 05/09/2022    CREATININE 1.09 05/09/2022    EGFRIFAFRI 74 11/09/2020    EGFRIFNONA 62 09/07/2021    BCR 14.7 05/09/2022    ANIONGAP 10.6 05/09/2022       Lab Results   Component Value Date    WBC 7.04 05/09/2022    HGB 16.9 05/09/2022    HCT 48.8 05/09/2022    MCV 85.9 05/09/2022     05/09/2022       Images:   No Images in the past 120 days " found..    Measures:   Tobacco:   Cipriano Montes  reports that he has never smoked. He has never used smokeless tobacco.  Assessment / Plan      Assessment/Plan:   82 y.o. male who presented today for follow up of progressive BPH status post UroLift placement.  Despite UroLift, patient continues to have bothersome urinary symptoms, his urinary symptoms may actually be worse than they were from surgery.  We discussed that typically takes 4 to 6 weeks to completely heal after UroLift procedure and he may be experiencing prostatic inflammation probably exacerbated by active urinary infection.  We will treat him with 7 days of cefuroxime and send a urine culture.  I will tentatively plan for cystoscopy 9- to reevaluate the prostatic fossa, to ensure no obviously abnormal anatomical positioning of the bladder neck or prostate which may have been a result of surgery causing him difficulty emptying.  I encouraged the patient to catheterize once or twice a day to ensure he is emptying as well to prevent nocturia in the meantime.    Diagnoses and all orders for this visit:    1. Benign prostatic hyperplasia with urinary obstruction (Primary)    -Status post UroLift placement, still with progressive symptoms   -Plan for flexible cystoscopy 9- to evaluate anatomy, discussed we can pursue more aggressive laser vaporization of prostate for definitive treatment if necessary, patient reports understanding    -     POC Urinalysis Dipstick, Automated    2. Acute cystitis without hematuria  -     Urine Culture - Urine, Urine, Clean Catch; Future  -     cefuroxime (CEFTIN) 500 MG tablet; Take 1 tablet by mouth 2 (Two) Times a Day.  Dispense: 14 tablet; Refill: 0  -     Urine Culture - Urine, Urine, Clean Catch           Follow Up:   Return in about 15 days (around 9/16/2022) for Flexible cystoscopy 9/16/22 AM thanks .    I spent approximately 30 minutes providing clinical care for this patient; including review of  patient's chart and provider documentation, face to face time spent with patient in examination room (obtaining history, performing physical exam, discussing diagnosis and management options), placing orders, and completing patient documentation.     Torsten Hawley MD  [facility]

## 2022-09-02 ENCOUNTER — TELEPHONE (OUTPATIENT)
Dept: UROLOGY | Facility: CLINIC | Age: 82
End: 2022-09-02

## 2022-09-02 ENCOUNTER — CLINICAL SUPPORT (OUTPATIENT)
Dept: UROLOGY | Facility: CLINIC | Age: 82
End: 2022-09-02

## 2022-09-02 ENCOUNTER — TELEPHONE (OUTPATIENT)
Dept: INTERNAL MEDICINE | Facility: CLINIC | Age: 82
End: 2022-09-02

## 2022-09-02 DIAGNOSIS — N40.1 BENIGN PROSTATIC HYPERPLASIA WITH URINARY OBSTRUCTION: ICD-10-CM

## 2022-09-02 DIAGNOSIS — N13.8 BENIGN PROSTATIC HYPERPLASIA WITH URINARY OBSTRUCTION: ICD-10-CM

## 2022-09-02 LAB — BACTERIA SPEC AEROBE CULT: NO GROWTH

## 2022-09-02 PROCEDURE — 99024 POSTOP FOLLOW-UP VISIT: CPT | Performed by: STUDENT IN AN ORGANIZED HEALTH CARE EDUCATION/TRAINING PROGRAM

## 2022-09-02 NOTE — TELEPHONE ENCOUNTER
Provider: DR. WEINER    Caller: DAX HULL    Relationship to Patient: SELF    Phone Number: [phone]    Reason for Call: NEEDS TO CATHETERIZE HISSELF AND CANNOT GET ANY RESULTS    When was the patient last seen: 9/1/22

## 2022-09-02 NOTE — TELEPHONE ENCOUNTER
Caller: Cipriano Montes    Relationship: Self    Best call back number: [phone]     Who are you requesting to speak with (clinical staff, provider,  specific staff member): DR HARDY      What was the call regarding: PATIENT CALLED TO SPEAK WITH DR HARDY ABOUT CATHERETIZATION.    Do you require a callback: YES

## 2022-09-07 NOTE — PROGRESS NOTES
dandre came into the office for help with cathing. I notified he just needed to get the catheter passed the prostate. He stated he felt better after I showed him how again. He was able to pass the catheter on his own and urine came out I notified him to leave it until the urine stops. He stated he would call with more questions.

## 2022-09-16 ENCOUNTER — PROCEDURE VISIT (OUTPATIENT)
Dept: UROLOGY | Facility: CLINIC | Age: 82
End: 2022-09-16

## 2022-09-16 VITALS — HEIGHT: 70 IN | WEIGHT: 217 LBS | BODY MASS INDEX: 31.07 KG/M2

## 2022-09-16 DIAGNOSIS — N13.8 BPH WITH OBSTRUCTION/LOWER URINARY TRACT SYMPTOMS: ICD-10-CM

## 2022-09-16 DIAGNOSIS — N40.1 BENIGN PROSTATIC HYPERPLASIA WITH URINARY OBSTRUCTION: Primary | ICD-10-CM

## 2022-09-16 DIAGNOSIS — R30.0 DYSURIA: ICD-10-CM

## 2022-09-16 DIAGNOSIS — N40.1 BPH WITH OBSTRUCTION/LOWER URINARY TRACT SYMPTOMS: ICD-10-CM

## 2022-09-16 DIAGNOSIS — N13.8 BENIGN PROSTATIC HYPERPLASIA WITH URINARY OBSTRUCTION: Primary | ICD-10-CM

## 2022-09-16 LAB
BILIRUB BLD-MCNC: NEGATIVE MG/DL
CLARITY, POC: ABNORMAL
COLOR UR: ABNORMAL
EXPIRATION DATE: ABNORMAL
GLUCOSE UR STRIP-MCNC: NEGATIVE MG/DL
KETONES UR QL: NEGATIVE
LEUKOCYTE EST, POC: ABNORMAL
Lab: ABNORMAL
NITRITE UR-MCNC: NEGATIVE MG/ML
PH UR: 6.5 [PH] (ref 5–8)
PROT UR STRIP-MCNC: ABNORMAL MG/DL
RBC # UR STRIP: ABNORMAL /UL
SP GR UR: 1.02 (ref 1–1.03)
UROBILINOGEN UR QL: NORMAL

## 2022-09-16 PROCEDURE — 87147 CULTURE TYPE IMMUNOLOGIC: CPT | Performed by: STUDENT IN AN ORGANIZED HEALTH CARE EDUCATION/TRAINING PROGRAM

## 2022-09-16 PROCEDURE — 81003 URINALYSIS AUTO W/O SCOPE: CPT | Performed by: STUDENT IN AN ORGANIZED HEALTH CARE EDUCATION/TRAINING PROGRAM

## 2022-09-16 PROCEDURE — 87086 URINE CULTURE/COLONY COUNT: CPT | Performed by: STUDENT IN AN ORGANIZED HEALTH CARE EDUCATION/TRAINING PROGRAM

## 2022-09-16 PROCEDURE — 87077 CULTURE AEROBIC IDENTIFY: CPT | Performed by: STUDENT IN AN ORGANIZED HEALTH CARE EDUCATION/TRAINING PROGRAM

## 2022-09-16 PROCEDURE — 87186 SC STD MICRODIL/AGAR DIL: CPT | Performed by: STUDENT IN AN ORGANIZED HEALTH CARE EDUCATION/TRAINING PROGRAM

## 2022-09-16 PROCEDURE — 52000 CYSTOURETHROSCOPY: CPT | Performed by: STUDENT IN AN ORGANIZED HEALTH CARE EDUCATION/TRAINING PROGRAM

## 2022-09-16 RX ORDER — NITROFURANTOIN 25; 75 MG/1; MG/1
100 CAPSULE ORAL 2 TIMES DAILY
Qty: 10 CAPSULE | Refills: 0 | Status: ON HOLD | OUTPATIENT
Start: 2022-09-16 | End: 2022-09-30

## 2022-09-16 NOTE — PROGRESS NOTES
Preprocedure diagnosis  BPH with urinary obstruction  S/p Urolift  Persistent LUTS, incomplete bladder emptying    Postprocedure diagnosis  BPH with urinary obstruction  S/p Urolift  Persistent LUTS, incomplete bladder emptying    Procedure  Flexible Cystourethroscopy    Attending surgeon  Torsten Hawley MD    Anesthesia  2% lidocaine jelly intraurethrally    Complications  None    Indications  82 y.o. male undergoing a flexible cystoscopy for the above mentioned indications.  Informed consent was obtained.      The patient has a history of refractory BPH with lower urinary tract symptoms.  He underwent UroLift placement 8/24/2022 with widely patent prostatic fossa at the conclusion of his procedure.  Despite this procedure, patient reports worsening urinary symptoms, even worse than prior to surgery.  Due to concern for anatomical change resulting in prostate obstruction I recommend flexible cystoscopy today to assess.    Findings  The urethral urothelium was within normal limits with no visible strictures.      The prostatic urethra revealed UroLift tines in appropriate position with no significant lateral lobe coaptation, with minimal median lobe.     Patient has a narrow appearing bladder neck with moderate anterior prostate tissue at the anterior bladder neck which may be causing obstruction.  I am able to advance the cystoscope and intermittent catheter into the patient's bladder easily.  Patient bladder was fairly full despite urination.    Bladder findings included bilateral ureters in orthotopic position, normal bladder mucosa without tumors, lesions, or stones.     Procedure  The patient was placed in supine position and prepped and draped in sterile fashion with lidocaine jelly instill per the urethra for anesthesia 5 minutes prior to procedure start.  A brief timeout was performed including available nursing staff and the patient.      The 16 Fr digital flexible cystoscope was lubricated and gently  advanced into the urethral meatus.     The penile and bulbar urethra appeared widely patent without visible lesion or stricture.     The prostatic urethra revealed UroLift tines in appropriate position with no significant lateral lobe coaptation, with minimal median lobe.     Patient has a narrow appearing bladder neck with moderate anterior prostate tissue at the anterior bladder neck which may be causing obstruction.  I am able to advance the cystoscope and intermittent catheter into the patient's bladder easily.  Patient bladder was fairly full despite urination.     The scope was then advanced into the bladder.  Patient's bladder was fairly full despite urination prior to cystoscopy.  The bladder was completely visualized starting with the trigone.     There were bilateral orthotopic ureteral orifices.     The posterior wall, lateral walls, anterior wall, and dome were completely visualized WITHOUT obvious mucosal lesions, tumors, stones, or trabeculations.      The cystoscope was retroflexed and the bladder neck and prostate were further visualized and appeared normal.      The cystoscope was then gently withdrawn while visualizing the urethra and the procedure was then terminated.  The patient tolerated the procedure well.      Follow Up:   I had a long discussion with the patient today, he has had worsening urinary symptoms despite UroLift with incomplete bladder emptying, and minimal urine volumes.  He has been intermittently catheterizing to empty himself at home with sometimes difficulty advancing the catheter into the bladder.  On cystoscopy today his prostate fossa is fairly open however he does have some anterior prostate tissue at the bladder neck which may be causing obstruction.  There is a chance that we may have caused some narrowing of the bladder neck related to pending the prostate fossa open with UroLift tines.  Given his progressive symptoms, his options include continued intermittent  catheterization which is less preferred by the patient or proceed with bipolar transurethral resection of prostate for definitive prostate tissue removal and UroLift juan removal.  We discussed risk the procedure including bleeding, infection, urinary incontinence, bladder neck contracture, urethral stricture, persistent incomplete bladder emptying requiring intermittent catheterization despite the procedure.  Anesthetic related complications discussed.    Patient states he would like to move forward with surgical procedure for bipolar TURP for completion outlet surgery.    Plan  Bipolar TURP 9/30/22   Urine culture today  Empiric Macrobid given blood and leukocytes on UA today    Torsten Hawley MD

## 2022-09-17 LAB
BILIRUB UR QL STRIP: NEGATIVE
CLARITY UR: ABNORMAL
COLOR UR: YELLOW
GLUCOSE UR STRIP-MCNC: NEGATIVE MG/DL
HGB UR QL STRIP.AUTO: ABNORMAL
KETONES UR QL STRIP: NEGATIVE
LEUKOCYTE ESTERASE UR QL STRIP.AUTO: ABNORMAL
NITRITE UR QL STRIP: POSITIVE
PH UR STRIP.AUTO: 7.5 [PH] (ref 5–8)
PROT UR QL STRIP: ABNORMAL
SP GR UR STRIP: 1.01 (ref 1–1.03)
UROBILINOGEN UR QL STRIP: ABNORMAL

## 2022-09-19 ENCOUNTER — TELEPHONE (OUTPATIENT)
Dept: UROLOGY | Facility: CLINIC | Age: 82
End: 2022-09-19

## 2022-09-19 NOTE — PROGRESS NOTES
Please have the lab speciate this culture (Staph coag negative) he is having surgery and was given Macrobid, need to make sure Macrobid covers this bacteria thanks

## 2022-09-19 NOTE — TELEPHONE ENCOUNTER
----- Message from Torsten Hawley MD sent at 9/19/2022  3:42 PM EDT -----  Please have the lab speciate this culture (Staph coag negative) he is having surgery and was given Macrobid, need to make sure Macrobid covers this bacteria thanks

## 2022-09-21 ENCOUNTER — TELEPHONE (OUTPATIENT)
Dept: UROLOGY | Facility: CLINIC | Age: 82
End: 2022-09-21

## 2022-09-21 DIAGNOSIS — N30.00 ACUTE CYSTITIS WITHOUT HEMATURIA: Primary | ICD-10-CM

## 2022-09-21 LAB — BACTERIA SPEC AEROBE CULT: ABNORMAL

## 2022-09-21 RX ORDER — SULFAMETHOXAZOLE AND TRIMETHOPRIM 800; 160 MG/1; MG/1
1 TABLET ORAL 2 TIMES DAILY
Qty: 10 TABLET | Refills: 0 | Status: ON HOLD | OUTPATIENT
Start: 2022-09-21 | End: 2022-09-30

## 2022-09-21 NOTE — TELEPHONE ENCOUNTER
Called patient with results of urine culture demonstrating Staph epidermidis, he was previously prescribed Macrobid however susceptibility was not checked against Macrobid and we will need to switch him to Bactrim based on susceptibilities.  Patient will  from his pharmacy and go ahead and clear his urine prior urologic surgery coming up to make sure we do not worsen an infection.  Patient reports understanding    Torsten Hawley MD

## 2022-09-26 ENCOUNTER — PRE-ADMISSION TESTING (OUTPATIENT)
Dept: PREADMISSION TESTING | Facility: HOSPITAL | Age: 82
End: 2022-09-26

## 2022-09-26 DIAGNOSIS — N40.1 BENIGN PROSTATIC HYPERPLASIA WITH URINARY OBSTRUCTION: ICD-10-CM

## 2022-09-26 DIAGNOSIS — N13.8 BENIGN PROSTATIC HYPERPLASIA WITH URINARY OBSTRUCTION: ICD-10-CM

## 2022-09-26 LAB
ANION GAP SERPL CALCULATED.3IONS-SCNC: 12 MMOL/L (ref 5–15)
BUN SERPL-MCNC: 15 MG/DL (ref 8–23)
BUN/CREAT SERPL: 11 (ref 7–25)
CALCIUM SPEC-SCNC: 9.4 MG/DL (ref 8.6–10.5)
CHLORIDE SERPL-SCNC: 104 MMOL/L (ref 98–107)
CO2 SERPL-SCNC: 21 MMOL/L (ref 22–29)
CREAT SERPL-MCNC: 1.36 MG/DL (ref 0.76–1.27)
DEPRECATED RDW RBC AUTO: 45.9 FL (ref 37–54)
EGFRCR SERPLBLD CKD-EPI 2021: 52.6 ML/MIN/1.73
ERYTHROCYTE [DISTWIDTH] IN BLOOD BY AUTOMATED COUNT: 14.1 % (ref 12.3–15.4)
GLUCOSE SERPL-MCNC: 165 MG/DL (ref 65–99)
HCT VFR BLD AUTO: 46.1 % (ref 37.5–51)
HGB BLD-MCNC: 15.4 G/DL (ref 13–17.7)
MCH RBC QN AUTO: 29.7 PG (ref 26.6–33)
MCHC RBC AUTO-ENTMCNC: 33.4 G/DL (ref 31.5–35.7)
MCV RBC AUTO: 88.8 FL (ref 79–97)
PLATELET # BLD AUTO: 213 10*3/MM3 (ref 140–450)
PMV BLD AUTO: 9.1 FL (ref 6–12)
POTASSIUM SERPL-SCNC: 4.4 MMOL/L (ref 3.5–5.2)
QT INTERVAL: 352 MS
QTC INTERVAL: 415 MS
RBC # BLD AUTO: 5.19 10*6/MM3 (ref 4.14–5.8)
SODIUM SERPL-SCNC: 137 MMOL/L (ref 136–145)
WBC NRBC COR # BLD: 6.31 10*3/MM3 (ref 3.4–10.8)

## 2022-09-26 PROCEDURE — 80048 BASIC METABOLIC PNL TOTAL CA: CPT

## 2022-09-26 PROCEDURE — 85027 COMPLETE CBC AUTOMATED: CPT

## 2022-09-26 PROCEDURE — 36415 COLL VENOUS BLD VENIPUNCTURE: CPT

## 2022-09-26 PROCEDURE — 93005 ELECTROCARDIOGRAM TRACING: CPT

## 2022-09-26 PROCEDURE — 93010 ELECTROCARDIOGRAM REPORT: CPT | Performed by: STUDENT IN AN ORGANIZED HEALTH CARE EDUCATION/TRAINING PROGRAM

## 2022-09-26 NOTE — PAT
Patient instructed to drink 20 ounces of Gatorade and it needs to be completed 1 hour (for Main OR patients) or 2 hours (scheduled  section & [facility] patients) before given arrival time for procedure (NO RED Gatorade)    Patient verbalized understanding.    An arrival time for procedure was not provided during PAT visit. If patient had any questions or concerns about their arrival time, they were instructed to contact their surgeon/physician.  Additionally, if the patient referred to an arrival time that was acquired from their my chart account, patient was encouraged to verify that time with their surgeon/physician. Arrival times are NOT provided in Pre Admission Testing Department.

## 2022-09-29 ENCOUNTER — ANESTHESIA EVENT (OUTPATIENT)
Dept: PERIOP | Facility: HOSPITAL | Age: 82
End: 2022-09-29

## 2022-09-29 RX ORDER — FAMOTIDINE 10 MG/ML
20 INJECTION, SOLUTION INTRAVENOUS ONCE
Status: CANCELLED | OUTPATIENT
Start: 2022-09-29 | End: 2022-09-29

## 2022-09-29 RX ORDER — SODIUM CHLORIDE 0.9 % (FLUSH) 0.9 %
10 SYRINGE (ML) INJECTION EVERY 12 HOURS SCHEDULED
Status: CANCELLED | OUTPATIENT
Start: 2022-09-29

## 2022-09-30 ENCOUNTER — HOSPITAL ENCOUNTER (OUTPATIENT)
Facility: HOSPITAL | Age: 82
Discharge: HOME | End: 2022-10-01
Attending: STUDENT IN AN ORGANIZED HEALTH CARE EDUCATION/TRAINING PROGRAM | Admitting: STUDENT IN AN ORGANIZED HEALTH CARE EDUCATION/TRAINING PROGRAM

## 2022-09-30 ENCOUNTER — ANESTHESIA (OUTPATIENT)
Dept: PERIOP | Facility: HOSPITAL | Age: 82
End: 2022-09-30

## 2022-09-30 ENCOUNTER — APPOINTMENT (OUTPATIENT)
Dept: GENERAL RADIOLOGY | Facility: HOSPITAL | Age: 82
End: 2022-09-30

## 2022-09-30 DIAGNOSIS — N40.1 BENIGN PROSTATIC HYPERPLASIA WITH URINARY OBSTRUCTION: ICD-10-CM

## 2022-09-30 DIAGNOSIS — N13.8 BENIGN PROSTATIC HYPERPLASIA WITH URINARY OBSTRUCTION: ICD-10-CM

## 2022-09-30 LAB
ANION GAP SERPL CALCULATED.3IONS-SCNC: 8 MMOL/L (ref 5–15)
BUN SERPL-MCNC: 10 MG/DL (ref 8–23)
BUN/CREAT SERPL: 11.9 (ref 7–25)
CALCIUM SPEC-SCNC: 8.3 MG/DL (ref 8.6–10.5)
CHLORIDE SERPL-SCNC: 109 MMOL/L (ref 98–107)
CO2 SERPL-SCNC: 21 MMOL/L (ref 22–29)
CREAT SERPL-MCNC: 0.84 MG/DL (ref 0.76–1.27)
DEPRECATED RDW RBC AUTO: 45.6 FL (ref 37–54)
EGFRCR SERPLBLD CKD-EPI 2021: 88.2 ML/MIN/1.73
ERYTHROCYTE [DISTWIDTH] IN BLOOD BY AUTOMATED COUNT: 14.1 % (ref 12.3–15.4)
GLUCOSE SERPL-MCNC: 109 MG/DL (ref 65–99)
HCT VFR BLD AUTO: 46.6 % (ref 37.5–51)
HGB BLD-MCNC: 15.6 G/DL (ref 13–17.7)
MCH RBC QN AUTO: 29.9 PG (ref 26.6–33)
MCHC RBC AUTO-ENTMCNC: 33.5 G/DL (ref 31.5–35.7)
MCV RBC AUTO: 89.4 FL (ref 79–97)
PLATELET # BLD AUTO: 198 10*3/MM3 (ref 140–450)
PMV BLD AUTO: 9.2 FL (ref 6–12)
POTASSIUM SERPL-SCNC: 4 MMOL/L (ref 3.5–5.2)
RBC # BLD AUTO: 5.21 10*6/MM3 (ref 4.14–5.8)
SODIUM SERPL-SCNC: 138 MMOL/L (ref 136–145)
WBC NRBC COR # BLD: 6.26 10*3/MM3 (ref 3.4–10.8)

## 2022-09-30 PROCEDURE — G0378 HOSPITAL OBSERVATION PER HR: HCPCS

## 2022-09-30 PROCEDURE — 76000 FLUOROSCOPY <1 HR PHYS/QHP: CPT

## 2022-09-30 PROCEDURE — 25010000002 HYDROMORPHONE PER 4 MG: Performed by: NURSE ANESTHETIST, CERTIFIED REGISTERED

## 2022-09-30 PROCEDURE — 85027 COMPLETE CBC AUTOMATED: CPT | Performed by: STUDENT IN AN ORGANIZED HEALTH CARE EDUCATION/TRAINING PROGRAM

## 2022-09-30 PROCEDURE — 25010000002 CEFAZOLIN IN DEXTROSE 2-4 GM/100ML-% SOLUTION: Performed by: STUDENT IN AN ORGANIZED HEALTH CARE EDUCATION/TRAINING PROGRAM

## 2022-09-30 PROCEDURE — 88305 TISSUE EXAM BY PATHOLOGIST: CPT | Performed by: STUDENT IN AN ORGANIZED HEALTH CARE EDUCATION/TRAINING PROGRAM

## 2022-09-30 PROCEDURE — 25010000002 DEXAMETHASONE PER 1 MG: Performed by: NURSE ANESTHETIST, CERTIFIED REGISTERED

## 2022-09-30 PROCEDURE — 52601 PROSTATECTOMY (TURP): CPT | Performed by: STUDENT IN AN ORGANIZED HEALTH CARE EDUCATION/TRAINING PROGRAM

## 2022-09-30 PROCEDURE — 25010000002 PROPOFOL 10 MG/ML EMULSION: Performed by: NURSE ANESTHETIST, CERTIFIED REGISTERED

## 2022-09-30 PROCEDURE — C1769 GUIDE WIRE: HCPCS | Performed by: STUDENT IN AN ORGANIZED HEALTH CARE EDUCATION/TRAINING PROGRAM

## 2022-09-30 PROCEDURE — 80048 BASIC METABOLIC PNL TOTAL CA: CPT | Performed by: STUDENT IN AN ORGANIZED HEALTH CARE EDUCATION/TRAINING PROGRAM

## 2022-09-30 PROCEDURE — 88344 IMHCHEM/IMCYTCHM EA MLT ANTB: CPT | Performed by: STUDENT IN AN ORGANIZED HEALTH CARE EDUCATION/TRAINING PROGRAM

## 2022-09-30 PROCEDURE — 25010000002 FENTANYL CITRATE (PF) 50 MCG/ML SOLUTION

## 2022-09-30 PROCEDURE — 25010000002 ONDANSETRON PER 1 MG: Performed by: NURSE ANESTHETIST, CERTIFIED REGISTERED

## 2022-09-30 RX ORDER — LIDOCAINE HYDROCHLORIDE 10 MG/ML
INJECTION, SOLUTION EPIDURAL; INFILTRATION; INTRACAUDAL; PERINEURAL AS NEEDED
Status: DISCONTINUED | OUTPATIENT
Start: 2022-09-30 | End: 2022-09-30 | Stop reason: SURG

## 2022-09-30 RX ORDER — FENTANYL CITRATE 50 UG/ML
INJECTION, SOLUTION INTRAMUSCULAR; INTRAVENOUS
Status: COMPLETED
Start: 2022-09-30 | End: 2022-09-30

## 2022-09-30 RX ORDER — CEFAZOLIN SODIUM 2 G/100ML
2 INJECTION, SOLUTION INTRAVENOUS ONCE
Status: COMPLETED | OUTPATIENT
Start: 2022-09-30 | End: 2022-09-30

## 2022-09-30 RX ORDER — LABETALOL HYDROCHLORIDE 5 MG/ML
5 INJECTION, SOLUTION INTRAVENOUS
Status: DISCONTINUED | OUTPATIENT
Start: 2022-09-30 | End: 2022-09-30 | Stop reason: HOSPADM

## 2022-09-30 RX ORDER — MAGNESIUM HYDROXIDE 1200 MG/15ML
LIQUID ORAL AS NEEDED
Status: DISCONTINUED | OUTPATIENT
Start: 2022-09-30 | End: 2022-09-30 | Stop reason: HOSPADM

## 2022-09-30 RX ORDER — ACETAMINOPHEN 325 MG/1
650 TABLET ORAL EVERY 6 HOURS PRN
Status: DISCONTINUED | OUTPATIENT
Start: 2022-09-30 | End: 2022-10-01 | Stop reason: HOSPADM

## 2022-09-30 RX ORDER — LIDOCAINE HYDROCHLORIDE 10 MG/ML
0.5 INJECTION, SOLUTION EPIDURAL; INFILTRATION; INTRACAUDAL; PERINEURAL ONCE AS NEEDED
Status: COMPLETED | OUTPATIENT
Start: 2022-09-30 | End: 2022-09-30

## 2022-09-30 RX ORDER — LEVOTHYROXINE SODIUM 0.1 MG/1
100 TABLET ORAL
Status: DISCONTINUED | OUTPATIENT
Start: 2022-10-01 | End: 2022-10-01 | Stop reason: HOSPADM

## 2022-09-30 RX ORDER — HYDROMORPHONE HYDROCHLORIDE 1 MG/ML
0.5 INJECTION, SOLUTION INTRAMUSCULAR; INTRAVENOUS; SUBCUTANEOUS
Status: DISCONTINUED | OUTPATIENT
Start: 2022-09-30 | End: 2022-10-01

## 2022-09-30 RX ORDER — DROPERIDOL 2.5 MG/ML
0.62 INJECTION, SOLUTION INTRAMUSCULAR; INTRAVENOUS ONCE AS NEEDED
Status: DISCONTINUED | OUTPATIENT
Start: 2022-09-30 | End: 2022-09-30 | Stop reason: HOSPADM

## 2022-09-30 RX ORDER — OXYBUTYNIN CHLORIDE 10 MG/1
10 TABLET, EXTENDED RELEASE ORAL DAILY
Status: DISCONTINUED | OUTPATIENT
Start: 2022-09-30 | End: 2022-10-01 | Stop reason: HOSPADM

## 2022-09-30 RX ORDER — SODIUM CHLORIDE, SODIUM LACTATE, POTASSIUM CHLORIDE, CALCIUM CHLORIDE 600; 310; 30; 20 MG/100ML; MG/100ML; MG/100ML; MG/100ML
9 INJECTION, SOLUTION INTRAVENOUS CONTINUOUS
Status: DISCONTINUED | OUTPATIENT
Start: 2022-09-30 | End: 2022-09-30

## 2022-09-30 RX ORDER — HYDROMORPHONE HYDROCHLORIDE 1 MG/ML
0.5 INJECTION, SOLUTION INTRAMUSCULAR; INTRAVENOUS; SUBCUTANEOUS
Status: DISCONTINUED | OUTPATIENT
Start: 2022-09-30 | End: 2022-09-30 | Stop reason: HOSPADM

## 2022-09-30 RX ORDER — ROCURONIUM BROMIDE 10 MG/ML
INJECTION, SOLUTION INTRAVENOUS AS NEEDED
Status: DISCONTINUED | OUTPATIENT
Start: 2022-09-30 | End: 2022-09-30 | Stop reason: SURG

## 2022-09-30 RX ORDER — ONDANSETRON 2 MG/ML
INJECTION INTRAMUSCULAR; INTRAVENOUS AS NEEDED
Status: DISCONTINUED | OUTPATIENT
Start: 2022-09-30 | End: 2022-09-30 | Stop reason: SURG

## 2022-09-30 RX ORDER — SODIUM CHLORIDE 9 MG/ML
50 INJECTION, SOLUTION INTRAVENOUS CONTINUOUS
Status: DISCONTINUED | OUTPATIENT
Start: 2022-09-30 | End: 2022-10-01 | Stop reason: HOSPADM

## 2022-09-30 RX ORDER — ALBUTEROL SULFATE 2.5 MG/3ML
2.5 SOLUTION RESPIRATORY (INHALATION) ONCE AS NEEDED
Status: DISCONTINUED | OUTPATIENT
Start: 2022-09-30 | End: 2022-09-30 | Stop reason: HOSPADM

## 2022-09-30 RX ORDER — FENTANYL CITRATE 50 UG/ML
50 INJECTION, SOLUTION INTRAMUSCULAR; INTRAVENOUS
Status: DISCONTINUED | OUTPATIENT
Start: 2022-09-30 | End: 2022-09-30 | Stop reason: HOSPADM

## 2022-09-30 RX ORDER — PROPOFOL 10 MG/ML
VIAL (ML) INTRAVENOUS AS NEEDED
Status: DISCONTINUED | OUTPATIENT
Start: 2022-09-30 | End: 2022-09-30 | Stop reason: SURG

## 2022-09-30 RX ORDER — ONDANSETRON 2 MG/ML
4 INJECTION INTRAMUSCULAR; INTRAVENOUS ONCE AS NEEDED
Status: DISCONTINUED | OUTPATIENT
Start: 2022-09-30 | End: 2022-09-30 | Stop reason: HOSPADM

## 2022-09-30 RX ORDER — AMOXICILLIN 250 MG
2 CAPSULE ORAL 2 TIMES DAILY
Status: DISCONTINUED | OUTPATIENT
Start: 2022-09-30 | End: 2022-10-01 | Stop reason: HOSPADM

## 2022-09-30 RX ORDER — ONDANSETRON 4 MG/1
4 TABLET, FILM COATED ORAL EVERY 6 HOURS PRN
Status: DISCONTINUED | OUTPATIENT
Start: 2022-09-30 | End: 2022-10-01 | Stop reason: HOSPADM

## 2022-09-30 RX ORDER — SODIUM CHLORIDE 9 MG/ML
9 INJECTION, SOLUTION INTRAVENOUS CONTINUOUS
Status: DISCONTINUED | OUTPATIENT
Start: 2022-09-30 | End: 2022-09-30

## 2022-09-30 RX ORDER — MIDAZOLAM HYDROCHLORIDE 1 MG/ML
0.5 INJECTION INTRAMUSCULAR; INTRAVENOUS
Status: DISCONTINUED | OUTPATIENT
Start: 2022-09-30 | End: 2022-09-30 | Stop reason: HOSPADM

## 2022-09-30 RX ORDER — HYOSCYAMINE SULFATE 0.125 MG
125 TABLET,DISINTEGRATING ORAL EVERY 4 HOURS PRN
Status: DISCONTINUED | OUTPATIENT
Start: 2022-09-30 | End: 2022-10-01 | Stop reason: HOSPADM

## 2022-09-30 RX ORDER — OXYCODONE HYDROCHLORIDE 5 MG/1
5 TABLET ORAL EVERY 4 HOURS PRN
Status: DISCONTINUED | OUTPATIENT
Start: 2022-09-30 | End: 2022-10-01

## 2022-09-30 RX ORDER — ATROPA BELLADONNA AND OPIUM 16.2; 6 MG/1; MG/1
SUPPOSITORY RECTAL AS NEEDED
Status: DISCONTINUED | OUTPATIENT
Start: 2022-09-30 | End: 2022-09-30 | Stop reason: HOSPADM

## 2022-09-30 RX ORDER — ONDANSETRON 2 MG/ML
4 INJECTION INTRAMUSCULAR; INTRAVENOUS EVERY 6 HOURS PRN
Status: DISCONTINUED | OUTPATIENT
Start: 2022-09-30 | End: 2022-10-01 | Stop reason: HOSPADM

## 2022-09-30 RX ORDER — DEXAMETHASONE SODIUM PHOSPHATE 4 MG/ML
INJECTION, SOLUTION INTRA-ARTICULAR; INTRALESIONAL; INTRAMUSCULAR; INTRAVENOUS; SOFT TISSUE AS NEEDED
Status: DISCONTINUED | OUTPATIENT
Start: 2022-09-30 | End: 2022-09-30 | Stop reason: SURG

## 2022-09-30 RX ORDER — FAMOTIDINE 20 MG/1
20 TABLET, FILM COATED ORAL ONCE
Status: COMPLETED | OUTPATIENT
Start: 2022-09-30 | End: 2022-09-30

## 2022-09-30 RX ORDER — SODIUM CHLORIDE 0.9 % (FLUSH) 0.9 %
10 SYRINGE (ML) INJECTION AS NEEDED
Status: DISCONTINUED | OUTPATIENT
Start: 2022-09-30 | End: 2022-09-30 | Stop reason: HOSPADM

## 2022-09-30 RX ADMIN — FENTANYL CITRATE 50 MCG: 50 INJECTION, SOLUTION INTRAMUSCULAR; INTRAVENOUS at 09:03

## 2022-09-30 RX ADMIN — ROCURONIUM BROMIDE 10 MG: 50 INJECTION, SOLUTION INTRAVENOUS at 08:16

## 2022-09-30 RX ADMIN — CEFAZOLIN SODIUM 2 G: 2 INJECTION, SOLUTION INTRAVENOUS at 07:30

## 2022-09-30 RX ADMIN — LIDOCAINE HYDROCHLORIDE 50 MG: 10 INJECTION, SOLUTION EPIDURAL; INFILTRATION; INTRACAUDAL; PERINEURAL at 07:29

## 2022-09-30 RX ADMIN — SENNOSIDES AND DOCUSATE SODIUM 2 TABLET: 50; 8.6 TABLET ORAL at 21:19

## 2022-09-30 RX ADMIN — ACETAMINOPHEN 650 MG: 325 TABLET, FILM COATED ORAL at 21:19

## 2022-09-30 RX ADMIN — OXYBUTYNIN CHLORIDE 10 MG: 10 TABLET, EXTENDED RELEASE ORAL at 18:00

## 2022-09-30 RX ADMIN — ONDANSETRON 4 MG: 2 INJECTION INTRAMUSCULAR; INTRAVENOUS at 08:19

## 2022-09-30 RX ADMIN — SODIUM CHLORIDE 9 ML/HR: 9 INJECTION, SOLUTION INTRAVENOUS at 07:15

## 2022-09-30 RX ADMIN — SODIUM CHLORIDE: 9 INJECTION, SOLUTION INTRAVENOUS at 08:35

## 2022-09-30 RX ADMIN — PROPOFOL 130 MG: 10 INJECTION, EMULSION INTRAVENOUS at 07:29

## 2022-09-30 RX ADMIN — SUGAMMADEX 200 MG: 100 INJECTION, SOLUTION INTRAVENOUS at 08:32

## 2022-09-30 RX ADMIN — FAMOTIDINE 20 MG: 20 TABLET ORAL at 07:15

## 2022-09-30 RX ADMIN — PROPOFOL 70 MG: 10 INJECTION, EMULSION INTRAVENOUS at 08:15

## 2022-09-30 RX ADMIN — LIDOCAINE HYDROCHLORIDE 0.5 ML: 10 INJECTION, SOLUTION EPIDURAL; INFILTRATION; INTRACAUDAL; PERINEURAL at 07:15

## 2022-09-30 RX ADMIN — ROCURONIUM BROMIDE 50 MG: 50 INJECTION, SOLUTION INTRAVENOUS at 07:29

## 2022-09-30 RX ADMIN — DEXAMETHASONE SODIUM PHOSPHATE 4 MG: 4 INJECTION, SOLUTION INTRA-ARTICULAR; INTRALESIONAL; INTRAMUSCULAR; INTRAVENOUS; SOFT TISSUE at 07:29

## 2022-09-30 RX ADMIN — HYDROMORPHONE HYDROCHLORIDE 0.5 MG: 1 INJECTION, SOLUTION INTRAMUSCULAR; INTRAVENOUS; SUBCUTANEOUS at 09:34

## 2022-09-30 RX ADMIN — ACETAMINOPHEN 650 MG: 325 TABLET, FILM COATED ORAL at 14:45

## 2022-10-01 ENCOUNTER — READMISSION MANAGEMENT (OUTPATIENT)
Dept: CALL CENTER | Facility: HOSPITAL | Age: 82
End: 2022-10-01

## 2022-10-01 VITALS
SYSTOLIC BLOOD PRESSURE: 170 MMHG | HEIGHT: 70 IN | WEIGHT: 216.93 LBS | BODY MASS INDEX: 31.06 KG/M2 | DIASTOLIC BLOOD PRESSURE: 82 MMHG | TEMPERATURE: 97.8 F | OXYGEN SATURATION: 91 % | HEART RATE: 70 BPM | RESPIRATION RATE: 16 BRPM

## 2022-10-01 LAB
ANION GAP SERPL CALCULATED.3IONS-SCNC: 8 MMOL/L (ref 5–15)
BUN SERPL-MCNC: 10 MG/DL (ref 8–23)
BUN/CREAT SERPL: 11.5 (ref 7–25)
CALCIUM SPEC-SCNC: 8.6 MG/DL (ref 8.6–10.5)
CHLORIDE SERPL-SCNC: 106 MMOL/L (ref 98–107)
CO2 SERPL-SCNC: 25 MMOL/L (ref 22–29)
CREAT SERPL-MCNC: 0.87 MG/DL (ref 0.76–1.27)
DEPRECATED RDW RBC AUTO: 46 FL (ref 37–54)
EGFRCR SERPLBLD CKD-EPI 2021: 87.2 ML/MIN/1.73
ERYTHROCYTE [DISTWIDTH] IN BLOOD BY AUTOMATED COUNT: 14 % (ref 12.3–15.4)
GLUCOSE SERPL-MCNC: 109 MG/DL (ref 65–99)
HCT VFR BLD AUTO: 41.5 % (ref 37.5–51)
HGB BLD-MCNC: 13.8 G/DL (ref 13–17.7)
MCH RBC QN AUTO: 29.9 PG (ref 26.6–33)
MCHC RBC AUTO-ENTMCNC: 33.3 G/DL (ref 31.5–35.7)
MCV RBC AUTO: 89.8 FL (ref 79–97)
PLATELET # BLD AUTO: 204 10*3/MM3 (ref 140–450)
PMV BLD AUTO: 9.2 FL (ref 6–12)
POTASSIUM SERPL-SCNC: 4.2 MMOL/L (ref 3.5–5.2)
RBC # BLD AUTO: 4.62 10*6/MM3 (ref 4.14–5.8)
SODIUM SERPL-SCNC: 139 MMOL/L (ref 136–145)
WBC NRBC COR # BLD: 12.36 10*3/MM3 (ref 3.4–10.8)

## 2022-10-01 PROCEDURE — 80048 BASIC METABOLIC PNL TOTAL CA: CPT | Performed by: STUDENT IN AN ORGANIZED HEALTH CARE EDUCATION/TRAINING PROGRAM

## 2022-10-01 PROCEDURE — G0378 HOSPITAL OBSERVATION PER HR: HCPCS

## 2022-10-01 PROCEDURE — 85027 COMPLETE CBC AUTOMATED: CPT | Performed by: STUDENT IN AN ORGANIZED HEALTH CARE EDUCATION/TRAINING PROGRAM

## 2022-10-01 PROCEDURE — 99024 POSTOP FOLLOW-UP VISIT: CPT | Performed by: STUDENT IN AN ORGANIZED HEALTH CARE EDUCATION/TRAINING PROGRAM

## 2022-10-01 RX ORDER — NITROFURANTOIN 25; 75 MG/1; MG/1
100 CAPSULE ORAL 2 TIMES DAILY
Qty: 6 CAPSULE | Refills: 0 | Status: SHIPPED | OUTPATIENT
Start: 2022-10-01 | End: 2022-10-04

## 2022-10-01 RX ORDER — HYOSCYAMINE SULFATE 0.12 MG/1
0.12 TABLET SUBLINGUAL EVERY 4 HOURS PRN
Qty: 30 EACH | Refills: 1 | Status: SHIPPED | OUTPATIENT
Start: 2022-10-01 | End: 2022-11-10

## 2022-10-01 RX ORDER — BACITRACIN ZINC AND POLYMYXIN B SULFATE 500; 1000 [USP'U]/G; [USP'U]/G
OINTMENT TOPICAL
Qty: 30 G | Refills: 0 | Status: SHIPPED | OUTPATIENT
Start: 2022-10-01 | End: 2023-02-20

## 2022-10-01 RX ORDER — OXYBUTYNIN CHLORIDE 5 MG/1
5 TABLET, EXTENDED RELEASE ORAL DAILY
Qty: 30 TABLET | Refills: 0 | Status: SHIPPED | OUTPATIENT
Start: 2022-10-01 | End: 2022-11-10

## 2022-10-01 RX ADMIN — OXYBUTYNIN CHLORIDE 10 MG: 10 TABLET, EXTENDED RELEASE ORAL at 08:21

## 2022-10-01 NOTE — PLAN OF CARE
Problem: Adult Inpatient Plan of Care  Goal: Plan of Care Review  Outcome: Met  Goal: Patient-Specific Goal (Individualized)  Outcome: Met  Goal: Absence of Hospital-Acquired Illness or Injury  Outcome: Met  Intervention: Identify and Manage Fall Risk  Recent Flowsheet Documentation  Taken 10/1/2022 0800 by Marlena Alejandro, RN  Safety Promotion/Fall Prevention:   activity supervised   assistive device/personal items within reach   clutter free environment maintained   fall prevention program maintained   nonskid shoes/slippers when out of bed   room organization consistent   safety round/check completed  Intervention: Prevent Skin Injury  Recent Flowsheet Documentation  Taken 10/1/2022 0800 by Marlena Alejandro RN  Body Position: position changed independently  Intervention: Prevent and Manage VTE (Venous Thromboembolism) Risk  Recent Flowsheet Documentation  Taken 10/1/2022 0800 by Marlena Alejandro RN  Activity Management: activity adjusted per tolerance  Goal: Optimal Comfort and Wellbeing  Outcome: Met  Goal: Readiness for Transition of Care  Outcome: Met     Problem: Pain Acute  Goal: Acceptable Pain Control and Functional Ability  Outcome: Met   Goal Outcome Evaluation:

## 2022-10-01 NOTE — DISCHARGE INSTRUCTIONS
Transurethral Resection of Prostate (TURP) Post-Operative Instructions    Please follow these guidelines after your procedure in order to assist with your recovery.     Anesthesia Precautions and Expectations  - Rest for 24 hours after receiving general anesthesia, make sure you have someone at home with you that can monitor you  - Do not operate a vehicle, drink alcohol, or make 'important decisions'/sign legal documentation during the immediate recovery period if you received sedation for your procedure  - You may experience a sore throat, jaw discomfort, or muscle aches related to anesthesia, these symptoms may last a few days    Activity  - Light activity is encouraged for 1 week to prevent urinary bleeding  - Avoid lifting heavy objects more than 20 lbs, running, or endurance exercise for 1 week   - Do not operate a vehicle or drink alcohol if you were prescribed narcotic pain medications     Bathing/Showering  - You may resume normal bathing and showering post-procedure    Pain/Urinary Symptoms  - You may experience burning urinary pain for a few days, and/or increased urinary urgency/frequency post-procedure for a few weeks which is expected  - A medication to treat burning urinary pain (Phenazopyridine) may be prescribed by your doctor, take as directed  - A medication to prevent urinary urgency/frequency (sometimes referred to as “bladder spasms”) (Hyoscyamine, Oxybutynin, Mirabegron, Solifenacin, etc.) may be prescribed by your doctor for up to 1 month, take as directed     Urinary Bleeding (Hematuria)   - Some degree of light urinary bleeding (hematuria) is expected for up to 1-2 weeks (this may be as light as pink lemonade or somewhat darker like clear/pale red Gatorade); a good rule of thumb is that your urine should remain see-through    - If you experience heavy urinary bleeding (like the color and consistency of tomato juice, or red wine), large blood clots, or you are unable to urinate for more than  8 hours you should contact your doctor and present to the nearest Emergency Department  - Drink plenty of water at home and stay hydrated, as this will help naturally flush out your bladder and urethra    Sexual Activity  - Refrain from sexual activity and ejaculation for 1 week while you are healing which may help prevent pain and bleeding    Hoffman Catheter  IF YOUR CATHETER WAS LEFT IN PLACE TO PROMOTE HEALING, YOUR SURGEON WILL ARRANGE FOLLOW UP FOR CATHETER REMOVAL IN THE UROLOGY CLINIC AND YOU WILL BE CONTACTED    - You may be sent home with a urethral catheter sometimes for up to 1 week post-procedure; catheter specific instructions are as follows:   - Do not attempt to remove your urinary catheter (unless explicitly instructed by your doctor with at home catheter removal instructions/equipment)  - If you feel that your catheter is not working the right way, call your doctor   - Keep your skin and catheter clean, clean the skin around your catheter at least two times each day, clean your skin  and catheter after every bowel movement  - Always keep your urine collection bag below the level of your bladder; keeping the bag below your bladder will help keep your urine from flowing back into your bladder from urine collection bag, which may cause infection     - Drink plenty of liquids, at least 6-8 glasses of healthy liquids or water each day which will help flush out your bladder  - Do not attempt sexual intercourse while you have a catheter in place  - Do not tug or pull on your catheter tubing. This can cause you to bleed and hurt your urethra. Do not step on the tubing when you walk. Always keep the catheter secured to your inner leg with either leg-tape provided, the special catheter sticker/securing device, or leg strap         Please contact your physician if you are experiencing the following symptoms after your procedure:    Fever >100 Farenheit, chills, nausea, vomiting, severe abdominal or chest pain,  severe urinary bleeding (darker than red fruit punch), urinary clots, catheter stops draining, or unable to urinate for more than 8 hours.     Please call the [facility] mainline at [phone], the Urology office at [phone], or present to your nearest Emergency Department if you have ongoing concerns mentioned above.     Please return to the urology office Monday, 10/3/2022 anytime between 8 and 10 AM for catheter removal and trial of void.

## 2022-10-01 NOTE — DISCHARGE SUMMARY
Date of Discharge:  10/1/2022    Discharge Diagnosis:   BPH with urinary obstruction    Problem List:  Active Hospital Problems    Diagnosis  POA   • **Benign prostatic hyperplasia [N40.0]  Yes   • Benign prostatic hyperplasia with urinary obstruction [N40.1, N13.8]  Yes      Resolved Hospital Problems   No resolved problems to display.       Presenting Problem/History of Present Illness  Benign prostatic hyperplasia with urinary obstruction [N40.1, N13.8]        Hospital Course  Patient is a 82 y.o. male presented with history of BPH with urinary obstruction status post remote UroLift, he has residual obstructive symptoms and flexible cystoscopy demonstrated residual anterior and bladder neck obstructing tissue.  He presented for bipolar TURP 9/30/2022.  He was admitted postoperatively after uncomplicated TURP with widely patent bladder neck.  Catheter placement was difficult and required placement over a wire with cystogram intraoperatively.  He was admitted on CBI and catheter continued to drain clear overnight.  His postoperative labs remained stable.  He ambulated, tolerated regular diet, denies nausea or vomiting, and performed catheter teaching prior to discharge.  He will return to my clinic 10/3/2022 Monday for catheter removal and trial of void.  We will follow-up on surgical pathology.      Procedures Performed    Procedure(s):  CYSTOSCOPY TRANSURETHRAL RESECTION OF PROSTATE (BIPOLAR)  -------------------       Consults:   Consults     No orders found for last 30 day(s).          Pertinent Test Results: labs: Hemoglobin and creatinine stable, mild reactive leukocytosis    Condition on Discharge: Stable    Vital Signs  Temp:  [97.5 °F (36.4 °C)-98.1 °F (36.7 °C)] 97.8 °F (36.6 °C)  Heart Rate:  [53-79] 70  Resp:  [16-18] 16  BP: (111-170)/(58-90) 170/82    Discharge Disposition  Home or Self Care    Discharge Medications     Discharge Medications      New Medications      Instructions Start Date    bacitracin-polymyxin b [id number] UNIT/GM ointment  Commonly known as: POLYSPORIN   Apply to catheter and penis tip twice daily      Hyoscyamine Sulfate SL 0.125 MG sublingual tablet  Commonly known as: Levsin/SL   0.125 mg, Sublingual, Every 4 Hours PRN      nitrofurantoin (macrocrystal-monohydrate) 100 MG capsule  Commonly known as: Macrobid   100 mg, Oral, 2 Times Daily, antibiotic      oxybutynin XL 5 MG 24 hr tablet  Commonly known as: DITROPAN-XL   5 mg, Oral, Daily, To prevent bladder spasms         Continue These Medications      Instructions Start Date   acetaminophen 650 MG 8 hr tablet  Commonly known as: TYLENOL   Every 6 Hours      B-complex with vitamin C tablet   1 tablet, Oral, Daily      Fluticasone Furoate-Vilanterol 100-25 MCG/INH inhaler  Commonly known as: Breo Ellipta   1 puff, Inhalation, Daily - RT      levothyroxine 100 MCG tablet  Commonly known as: SYNTHROID, LEVOTHROID   100 mcg, Oral, Daily      NIFEdipine XL 60 MG 24 hr tablet  Commonly known as: PROCARDIA XL   60 mg, Oral, Daily      PROBIOTIC-10 PO   Probiotic   1 capsule daily      vitamin B-12 1000 MCG tablet  Commonly known as: CYANOCOBALAMIN   1,000 mcg, Oral, Daily             Discharge Diet   Diet Instructions     Diet: Regular      Discharge Diet: Regular          Activity at Discharge  Activity Instructions     Lifting Restrictions      Type of Restriction: Lifting    Lifting Restrictions: Lifting Restriction (Indicate Limit)    Weight Limit (Pounds): 25    Length of Lifting Restriction: 2 weeks          Follow-up Appointments  Future Appointments   Date Time Provider Department Center   8/14/2023  3:00 PM Daija Freitas APRN [facility]     Additional Instructions for the Follow-ups that You Need to Schedule     Call MD With Problems / Concerns   As directed      Please contact your physician if you are experiencing the following symptoms after your procedure:    Fever >100 Farenheit, chills, nausea, vomiting, severe  abdominal or chest pain, severe urinary bleeding (darker than red fruit punch), urinary clots, or unable to urinate for more than 8 hours.     Please call the [facility] mainline at [phone], the Urology office at [phone], or present to your nearest Emergency Department if you have ongoing concerns mentioned above.    Order Comments: Please contact your physician if you are experiencing the following symptoms after your procedure:  Fever >100 Farenheit, chills, nausea, vomiting, severe abdominal or chest pain, severe urinary bleeding (darker than red fruit punch), urinary clots, or unable to urinate for more than 8 hours.  Please call the [facility] mainline at [phone], the Urology office at [phone], or present to your nearest Emergency Department if you have ongoing concerns mentioned above.          Discharge Follow-up with Specified Provider: Torsten Hawley MD   As directed      To: Torsten Hawley MD    Follow Up Details: Monday 10/3/22 catheter removal               Test Results Pending at Discharge  Pending Labs     Order Current Status    Tissue Pathology Exam In process          Torsten Hawley MD    Time: Discharge 25 min

## 2022-10-01 NOTE — OP NOTE
CYSTOSCOPY TRANSURETHRAL RESECTION OF PROSTATE WITH SALINE  Procedure Report    Patient Name:  Cipriano Montes  YOB: 1940    Date of Surgery:  9/30/2022     Indications: 82-year-old male with history of chronic BPH and urinary obstruction, status post UroLift procedure 8/24/2022.  Despite UroLift, the patient actually had worsening of his urinary symptoms with weak urine flow requiring intermittent catheterization.  Flexible cystoscopy was performed in my clinic demonstrating possible anterior residual obstructing tissue and possible anatomical change at the bladder neck as a result of UroLift with possible narrowing therefore decision was made to perform formal bipolar transurethral resection of prostate with removal of UroLift implants.  Risks benefits and alternatives were discussed.  Informed consent was obtained    Pre-op Diagnosis:   Benign prostatic hyperplasia with urinary obstruction [N40.1, N13.8]       Post-Op Diagnosis Codes:     * Benign prostatic hyperplasia with urinary obstruction [N40.1, N13.8]      Procedure(s):  CYSTOSCOPY TRANSURETHRAL RESECTION OF PROSTATE (BIPOLAR)  COMPLEX BRINK CATHETER PLACEMENT     Staff:  Surgeon(s):  Torsten Hawley MD       Anesthesia: General    Estimated Blood Loss: 50 cc    Implants:    Nothing was implanted during the procedure    Specimen:            Drains: 22 Fr 3-way brink       Findings: Significant amount of residual obstructing prostate tissue laterally, anteriorly and with subtle median lobe with intravesical impingement on the trigone, bilateral ureteral orifices well near the bladder neck requiring meticulous dissection.  Large left trigone diverticulum consistent with chronic bladder outlet obstruction, left ureteral orifice within the left diverticulum.  Moderate undermining of the bladder neck with difficult Brink catheter placement at conclusion of procedure requiring placement over wire and cystogram to confirm  placement    Complications: None    Description of Procedure:   After informed consent was obtained and signed, the patient was taken back to the operating suite. A time-out was performed to verify procedure details and the patient's identity. Next, the patient was placed supine on the operating table. Preoperative antibiotics were infused. The patient then underwent smooth induction of general endotracheal anesthesia. Bilateral lower extremity sequential compression devices were cycling. The patient was then moved to a dorsal lithotomy position. All pressure points were carefully padded. The genitals were then prepped and draped in usual sterile fashion.  The patient's urethra was dilated with Pepin sounds to 30 German.     Next, a 26-German continuous flow resectoscope was assembled and inserted atraumatically into the urethra and into the bladder.  Previous UroLift implants were visible along the lateral lobes from the base to the apex.  There was residual lateral lobe hyperplasia and coapting prostatic lobes, with high and tight bladder neck.  The bladder appeared normal without lesions, the right ureteral orifice was in the orthotopic position fairly close to the bladder neck.  The left ureteral orifice was difficult to identify and there was a large left trigone diverticulum indicative of chronic bladder outlet obstruction with presumed location of the left ureteral orifice along the medial wall of the diverticulum.    Using the bipolar loop on cut cautery we began resecting the bladder neck adenoma down to the muscle fibers from 3 to 9 o'clock position.  Next, the scope was seated at the verumontanum which was the distal extent of resection, and the prostate adenoma was resected starting at the left lobe and moving to the right side.   We encountered 3 UroLift implants which were resected and irrigated out through the scope on the patient's right side.  We encountered 3 UroLift implants on the patient's  left side which were removed and irrigated out through the scope.      Bleeding vessels were controlled with coagulation. There was noted a small left sided prostatic capsule perforation with glistening fat visible, coagulation around this area was performed. The resection was continued in a step wise fashion down to the prostatic capsular fibers.     Next, we resected the bladder neck adenoma and anterior prostatic adenoma at the 2 to 10 o'clock position in a step wise fashion down to bladder neck and prostatic capsular fibers. Hemostasis was obtained in identical fashion.     We re-entered the bladder and a loyda syringe was used to irrigate out prostatic chips, these were sent to pathology.    At the conclusion of the procedure there appeared to be undermining at the patient's bladder neck however we were unable to flatten this out to the trigone because the ureteral orifices were very near the bladder outlet and bladder neck.  I attempted to advance a 22 Armenian three-way Hoffman catheter into the bladder however this would not pass easily.  I removed the catheter and readvanced the cystoscope and there was a false passage noted inferior to the bladder neck.  I then seated the resectoscope within the bladder lumen rightward and vertically given the patient's abnormal bladder orientation, I advanced a sensor wire into the bladder with multiple coils and backed out the resectoscope.  I then advanced a 22 Armenian three-way Ohffman catheter over the wire into the bladder and inflated the balloon with 40 cc of sterile water.  To confirm that the catheter balloon was not seated within the diverticulum or within the prostatic fossa itself, I mixed Isovue contrast with saline and injected this into the bladder and performed a cystogram which identified appropriate location of the patient's Hoffman catheter balloon.    Given that there was good hemostasis under no-flow and all prostatic chips had been removed, the patient's  bladder was drained.  The procedure was then concluded. The patient was awoken from anesthesia and taken to PACU in good condition.    Disposition/Follow Up:   Admit to [facility] floor overnight for continuous bladder irrigation and discharge in the morning pending labs and weaning CBI, return to clinic Monday, 10/3/2022 for catheter removal and trial of void.    Torsten Hawley MD     Date: 10/1/2022  Time: 12:00 EDT

## 2022-10-01 NOTE — OUTREACH NOTE
Prep Survey    Flowsheet Row Responses   [facility] patient discharged from? [place]   Is LACE score < 7 ? Yes   Emergency Room discharge w/ pulse ox? No   Eligibility [facility]   Date of Admission 09/30/22   Date of Discharge 10/01/22   Discharge Disposition Home or Self Care   Discharge diagnosis CYSTOSCOPY TRANSURETHRAL RESECTION OF PROSTATE (BIPOLAR   Does the patient have one of the following disease processes/diagnoses(primary or secondary)? General Surgery   Does the patient have Home health ordered? No   Is there a DME ordered? No   Prep survey completed? Yes          DINORA BROWN - Registered Nurse

## 2022-10-01 NOTE — PROGRESS NOTES
[facility]   Urology Progress Note    Patient Name: Cipriano Montes  : 1940  MRN: [id number]  Primary Care Physician:  Ron Velazquez MD  Date of admission: 2022    Subjective   Subjective     Chief Complaint: BPH     HPI:  Patient Reports no significant issues overnight.  Catheter draining well.  Did not require bladder flushing.  CBI running clear all night.  Denies bladder spasms.  Denies fevers or chills he has ambulated, he is tolerating regular diet.    Review of Systems   All systems were reviewed and negative except for: None    Objective   Objective     Vitals:   Temp:  [97.5 °F (36.4 °C)-98.1 °F (36.7 °C)] 97.8 °F (36.6 °C)  Heart Rate:  [53-79] 70  Resp:  [16-18] 16  BP: (111-170)/(58-90) 170/82  Flow (L/min):  [2] 2  Physical Exam    Constitutional: Awake in bed, alert   Eyes: PERRLA, sclerae anicteric, no conjunctival injection   HENT: Normocephalic, atraumatic, mucous membranes moist   Neck: Supple, trachea midline   Respiratory: Equal chest rise, non-labored respirations    Cardiovascular: RRR, palpable radial pulses bilaterally   Gastrointestinal: Soft, nontender, non-distended   Genitourinary: circumcised phallus, orthotopic meatus, bilaterally descended testicles without masses, Hoffman catheter in place draining clear urine on slow drip CBI   Musculoskeletal: No lower extremity edema bilaterally, no clubbing or cyanosis to extremities   Psychiatric: Appropriate affect, cooperative   Neurologic: Oriented x 3,  Cranial Nerves grossly intact, speech clear   Skin: No rashes     Result Review    Result Review:  I have personally reviewed the results from the time of this admission to 10/1/2022 09:47 EDT and agree with these findings:  [x]  Laboratory  []  Microbiology  []  Radiology  []  EKG/Telemetry   []  Cardiology/Vascular   []  Pathology  []  Old records  []  Other:    Most notable findings include: Stable hemoglobin, stable creatinine    Assessment & Plan   Assessment / Plan      Brief Patient Summary:  Cipriano Montes is a 82 y.o. male who has a history of BPH and urinary obstruction status post previous UroLift, with residual obstructive symptoms who now is status post bipolar TURP 9/30/2022.  He was admitted overnight for bladder irrigation.  Postoperative labs have remained stable.  Patient can discharge, CBI was discontinued today.  Draining clear.  He will return to clinic Monday, 10/3/2022 for catheter removal and trial of void.    Active Hospital Problems:  Active Hospital Problems    Diagnosis    • **Benign prostatic hyperplasia    • Benign prostatic hyperplasia with urinary obstruction        Plan:     - Pain control: P.o. oxycodone, as needed Tylenol  - Diet/FEN: Regular diet, saline lock IV fluid  - GI: bowel regimen  - : Maintain Hoffman, CBI irrigation port plugged, return to clinic Monday for catheter removal  - Heme/ID: Hemoglobin stable, DC Ancef  - PPx: SCDs  - OOB/Amb  -Disposition: Home today, return to clinic Monday, 10/3/2022 for catheter removal         DVT prophylaxis:  Mechanical DVT prophylaxis orders are present.    CODE STATUS:   Level Of Support Discussed With: Patient  Code Status (Patient has no pulse and is not breathing): CPR (Attempt to Resuscitate)  Medical Interventions (Patient has pulse or is breathing): Full Support  Release to patient: Routine Release    Disposition:  I expect patient to discharge this morning.    Electronically signed by Torsten Hawley MD, 10/01/22, 9:47 AM EDT.

## 2022-10-03 ENCOUNTER — TELEPHONE (OUTPATIENT)
Dept: INTERNAL MEDICINE | Facility: CLINIC | Age: 82
End: 2022-10-03

## 2022-10-03 ENCOUNTER — TRANSITIONAL CARE MANAGEMENT TELEPHONE ENCOUNTER (OUTPATIENT)
Dept: CALL CENTER | Facility: HOSPITAL | Age: 82
End: 2022-10-03

## 2022-10-03 ENCOUNTER — CLINICAL SUPPORT (OUTPATIENT)
Dept: UROLOGY | Facility: CLINIC | Age: 82
End: 2022-10-03

## 2022-10-03 VITALS — WEIGHT: 216 LBS | BODY MASS INDEX: 30.92 KG/M2 | HEIGHT: 70 IN

## 2022-10-03 NOTE — PROGRESS NOTES
10/03/22  MB        -valeriano clear urine in catheter bag.  -filled bladder with 300 mL's of sterile water.  -Hoffman cath removed per order.  -Withdrew 30 cc of water from balloon  -Catheter removed without difficulty; catheter tip intact.  -pt tolerated well   -penis irritation on head of penis  -cleaned head of penis with iodine  -provided patient with 2 cups of water to drink  -pt void 200 mL's of urine     --ask pt to contact us if needed, if pt is unable to urinate and is experiencing pain to go to ER.  -ask pt to drink plenty of fluids, expect some blood in urine.        -sent message to Dr. Hawley on status of patient's trial of void

## 2022-10-03 NOTE — OUTREACH NOTE
Call Center TCM Note    Flowsheet Row Responses   [facility] patient discharged from? [place]   Does the patient have one of the following disease processes/diagnoses(primary or secondary)? General Surgery   TCM attempt successful? Yes  [ VR on file]   Call start time 1601   Call end time 1608   Discharge diagnosis CYSTOSCOPY TRANSURETHRAL RESECTION OF PROSTATE (BIPOLAR   Is patient permission given to speak with other caregiver? Yes   List who call center can speak with wife   Meds reviewed with patient/caregiver? Yes   Is the patient having any side effects they believe may be caused by any medication additions or changes? No   Does the patient have all medications related to this admission filled (includes all antibiotics, pain medications, etc.) Yes   Is the patient taking all medications as directed (includes completed medication regime)? Yes   Comments Hosp f/u appt with Dr Marcus VILLA until 22, which is outside TCM time frame. Offered appt within timeframe with PA/APRN, Pt declines to see other providers. Routed call to office.    Psychosocial issues? No   Comments Hoffman was removed today in office, no issues voiding. No issues eating/drinking, using stool softener for constipation.    Did the patient receive a copy of their discharge instructions? Yes   Nursing interventions Reviewed instructions with patient   What is the patient's perception of their health status since discharge? Improving   Nursing interventions Nurse provided patient education   Is the patient /caregiver able to teach back basic post-op care? Continue use of incentive spirometry at least 1 week post discharge, Practice 'cough and deep breath', Lifting as instructed by MD in discharge instructions   Is the patient/caregiver able to teach back signs and symptoms of incisional infection? Increased redness, swelling or pain at the incisonal site, Increased drainage or bleeding   Is the patient/caregiver able to teach back  steps to recovery at home? Set small, achievable goals for return to baseline health   If the patient is a current smoker, are they able to teach back resources for cessation? Not a smoker   Is the patient/caregiver able to teach back the hierarchy of who to call/visit for symptoms/problems? PCP, Specialist, Home health nurse, Urgent Care, ED, 911 Yes   TCM call completed? Yes          Alexa Buenrostro RN    10/3/2022, 16:09 EDT

## 2022-10-03 NOTE — TELEPHONE ENCOUNTER
Caller: Cipriano Montes    Relationship to patient: Self    Best call back number: [phone]    Patient is needing:   PATIENT HAS HAD UROLOGY SURGERY AND PROSTATE SURGERY, PATIENT NEEDS TO SPEAK WITH DR HARDY ABOUT MEDICATIONS HE IS CURRENTLY TAKING. THERE ARE NO APPOINTMENTS AVAILABLE UNTIL EARLY December, PLEASE LET PATIENT KNOW WHAT TO DO.   PLEASE CALL BACK.

## 2022-10-04 ENCOUNTER — OFFICE VISIT (OUTPATIENT)
Dept: INTERNAL MEDICINE | Facility: CLINIC | Age: 82
End: 2022-10-04

## 2022-10-04 VITALS
DIASTOLIC BLOOD PRESSURE: 70 MMHG | RESPIRATION RATE: 16 BRPM | OXYGEN SATURATION: 97 % | WEIGHT: 216 LBS | TEMPERATURE: 98.6 F | HEART RATE: 70 BPM | BODY MASS INDEX: 30.92 KG/M2 | SYSTOLIC BLOOD PRESSURE: 152 MMHG | HEIGHT: 70 IN

## 2022-10-04 DIAGNOSIS — E53.1 VITAMIN B6 DEFICIENCY: ICD-10-CM

## 2022-10-04 DIAGNOSIS — A77.0 RMSF (ROCKY MOUNTAIN SPOTTED FEVER): ICD-10-CM

## 2022-10-04 DIAGNOSIS — J43.1 PANLOBULAR EMPHYSEMA: ICD-10-CM

## 2022-10-04 DIAGNOSIS — E53.8 VITAMIN B12 DEFICIENCY: ICD-10-CM

## 2022-10-04 DIAGNOSIS — Z23 NEED FOR INFLUENZA VACCINATION: Primary | ICD-10-CM

## 2022-10-04 DIAGNOSIS — E78.00 HYPERCHOLESTEREMIA: ICD-10-CM

## 2022-10-04 DIAGNOSIS — R25.1 TREMOR: ICD-10-CM

## 2022-10-04 LAB
CYTO UR: NORMAL
LAB AP CASE REPORT: NORMAL
LAB AP CLINICAL INFORMATION: NORMAL
LAB AP DIAGNOSIS COMMENT: NORMAL
PATH REPORT.FINAL DX SPEC: NORMAL
PATH REPORT.GROSS SPEC: NORMAL

## 2022-10-04 PROCEDURE — G0008 ADMIN INFLUENZA VIRUS VAC: HCPCS | Performed by: INTERNAL MEDICINE

## 2022-10-04 PROCEDURE — 90662 IIV NO PRSV INCREASED AG IM: CPT | Performed by: INTERNAL MEDICINE

## 2022-10-04 PROCEDURE — 99214 OFFICE O/P EST MOD 30 MIN: CPT | Performed by: INTERNAL MEDICINE

## 2022-10-04 NOTE — PROGRESS NOTES
Subjective     Patient ID: Cipriano Montes is a 82 y.o. male. Patient is here for management of multiple medical problems.     Chief Complaint   Patient presents with   • Hypertension     History of Present Illness     Urolift. Didn't go well. Then TUrm and cath out. Still open.         Has cataract surgery Now.       Rmsf  Tremor.   Had abx. Unfortunately tremor not better. Labs trending up.        The following portions of the patient's history were reviewed and updated as appropriate: allergies, current medications, past family history, past medical history, past social history, past surgical history and problem list.    Review of Systems    Current Outpatient Medications:   •  acetaminophen (TYLENOL) 650 MG 8 hr tablet, Every 8 (Eight) Hours As Needed., Disp: , Rfl:   •  B Complex-C (B-complex with vitamin C) tablet, Take 1 tablet by mouth Daily., Disp: 90 tablet, Rfl: 3  •  bacitracin-polymyxin b (POLYSPORIN) 500-57743 UNIT/GM ointment, Apply to catheter and penis tip twice daily, Disp: 30 g, Rfl: 0  •  Fluticasone Furoate-Vilanterol (Breo Ellipta) 100-25 MCG/INH inhaler, Inhale 1 puff Daily., Disp: 60 each, Rfl: 3  •  Hyoscyamine Sulfate SL (Levsin/SL) 0.125 MG sublingual tablet, Place 1 tablet under the tongue Every 4 (Four) Hours As Needed (Breakthrough bladder spasms)., Disp: 30 each, Rfl: 1  •  levothyroxine (SYNTHROID, LEVOTHROID) 100 MCG tablet, TAKE 1 TABLET BY MOUTH DAILY, Disp: 90 tablet, Rfl: 3  •  NIFEdipine XL (PROCARDIA XL) 60 MG 24 hr tablet, TAKE 1 TABLET BY MOUTH DAILY, Disp: 90 tablet, Rfl: 3  •  oxybutynin XL (DITROPAN-XL) 5 MG 24 hr tablet, Take 1 tablet by mouth Daily. To prevent bladder spasms, Disp: 30 tablet, Rfl: 0  •  Probiotic Product (PROBIOTIC-10 PO), Probiotic  1 capsule daily, Disp: , Rfl:   •  vitamin B-12 (CYANOCOBALAMIN) 1000 MCG tablet, Take 100 mcg by mouth Daily., Disp: , Rfl:     Objective      Blood pressure 152/70, pulse 70, temperature 98.6 °F (37 °C), resp. rate 16,  "height 177.8 cm (70\"), weight 98 kg (216 lb), SpO2 97 %.    Physical Exam     General Appearance:    Alert, cooperative, no distress, appears stated age   Head:    Normocephalic, without obvious abnormality, atraumatic   Eyes:    PERRL, conjunctiva/corneas clear, EOM's intact   Ears:    Normal TM's and external ear canals, both ears   Nose:   Nares normal, septum midline, mucosa normal, no drainage   or sinus tenderness   Throat:   Lips, mucosa, and tongue normal; teeth and gums normal   Neck:   Supple, symmetrical, trachea midline, no adenopathy;        thyroid:  No enlargement/tenderness/nodules; no carotid    bruit or JVD   Back:     Symmetric, no curvature, ROM normal, no CVA tenderness   Lungs:     Clear to auscultation bilaterally, respirations unlabored   Chest wall:    No tenderness or deformity   Heart:    Regular rate and rhythm, S1 and S2 normal, no murmur,        rub or gallop   Abdomen:     Soft, non-tender, bowel sounds active all four quadrants,     no masses, no organomegaly   Extremities:   Extremities normal, atraumatic, no cyanosis or edema   Pulses:   2+ and symmetric all extremities   Skin:   Skin color, texture, turgor normal, no rashes or lesions   Lymph nodes:   Cervical, supraclavicular, and axillary nodes normal   Neurologic:   CNII-XII intact. Normal strength, sensation and reflexes       throughout      Results for orders placed or performed during the hospital encounter of 09/30/22   CBC (No Diff)    Specimen: Blood   Result Value Ref Range    WBC 6.26 3.40 - 10.80 10*3/mm3    RBC 5.21 4.14 - 5.80 10*6/mm3    Hemoglobin 15.6 13.0 - 17.7 g/dL    Hematocrit 46.6 37.5 - 51.0 %    MCV 89.4 79.0 - 97.0 fL    MCH 29.9 26.6 - 33.0 pg    MCHC 33.5 31.5 - 35.7 g/dL    RDW 14.1 12.3 - 15.4 %    RDW-SD 45.6 37.0 - 54.0 fl    MPV 9.2 6.0 - 12.0 fL    Platelets 198 140 - 450 10*3/mm3   Basic Metabolic Panel    Specimen: Blood   Result Value Ref Range    Glucose 109 (H) 65 - 99 mg/dL    BUN 10 8 - 23 " mg/dL    Creatinine 0.84 0.76 - 1.27 mg/dL    Sodium 138 136 - 145 mmol/L    Potassium 4.0 3.5 - 5.2 mmol/L    Chloride 109 (H) 98 - 107 mmol/L    CO2 21.0 (L) 22.0 - 29.0 mmol/L    Calcium 8.3 (L) 8.6 - 10.5 mg/dL    BUN/Creatinine Ratio 11.9 7.0 - 25.0    Anion Gap 8.0 5.0 - 15.0 mmol/L    eGFR 88.2 >60.0 mL/min/1.73   Basic Metabolic Panel    Specimen: Blood   Result Value Ref Range    Glucose 109 (H) 65 - 99 mg/dL    BUN 10 8 - 23 mg/dL    Creatinine 0.87 0.76 - 1.27 mg/dL    Sodium 139 136 - 145 mmol/L    Potassium 4.2 3.5 - 5.2 mmol/L    Chloride 106 98 - 107 mmol/L    CO2 25.0 22.0 - 29.0 mmol/L    Calcium 8.6 8.6 - 10.5 mg/dL    BUN/Creatinine Ratio 11.5 7.0 - 25.0    Anion Gap 8.0 5.0 - 15.0 mmol/L    eGFR 87.2 >60.0 mL/min/1.73   CBC (No Diff)    Specimen: Blood   Result Value Ref Range    WBC 12.36 (H) 3.40 - 10.80 10*3/mm3    RBC 4.62 4.14 - 5.80 10*6/mm3    Hemoglobin 13.8 13.0 - 17.7 g/dL    Hematocrit 41.5 37.5 - 51.0 %    MCV 89.8 79.0 - 97.0 fL    MCH 29.9 26.6 - 33.0 pg    MCHC 33.3 31.5 - 35.7 g/dL    RDW 14.0 12.3 - 15.4 %    RDW-SD 46.0 37.0 - 54.0 fl    MPV 9.2 6.0 - 12.0 fL    Platelets 204 140 - 450 10*3/mm3   Tissue Pathology Exam    Specimen: Prostate; Tissue   Result Value Ref Range    Case Report       Surgical Pathology Report                         Case: [id number]                                  Authorizing Provider:  Torsten Hawley MD    Collected:           09/30/2022 07:53 AM          Ordering Location:     [facility]   Received:            09/30/2022 09:21 AM                                 OR                                                                           Pathologist:           Jem Regalado MD                                                        Specimen:    Prostate, PROSTATE CHIPS FOR PERMANENT                                                     Clinical Information       Benign prostatic hyperplasia with urinary obstruction      Final  Diagnosis       PROSTATE, TRANSURETHRAL RESECTION:  Prostatic acinar adenocarcinoma, grade group 1, 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    PROSTATE TURP  SPECIMEN TYPE/PROCEDURE: Transurethral resection of the prostate (TURP)  SPECIMEN SIZE (grams): 7 g  HISTOLOGIC TYPE: Prostatic acinar adenocarcinoma  HISTOLOGIC GRADE: Grade group 1 ([place] score 3+3=6)  PERCENT OF PRATIMA PATTERN 4: 0  INTRADUCTAL CARCINOMA: Not identified  ESTIMATED PERCENTAGE OF PROSTATIC TISSUE INVOLVED BY TUMOR: Less than 1%  LYMPHVASCULAR INVASION: Not identified  PERINEURAL INVASION: Not identified  ADDITIONAL PATHOLOGIC FINDINGS: Hyperplasia  TREATMENT EFFECT: No known presurgical therapy  OTHER STUDIES: Immunohistochemical staining      Comment       To chips show involvement by a grade group 1 prostatic acinar adenocarcinoma.  The presence of carcinoma is confirmed by immunohistochemical cocktail stain showing positive expression of P504S with a loss of expression of high molecular weight cytokeratin and p63.    This diagnosis was communicated with Dr. Hawley by Dr. Regalado on 10/4/2022.      Gross Description       1. Prostate.  Received in formalin labeled prostate chips is a 7 g, 6 x 4 x 1 cm aggregate of fragmented, tan-pink tissue.  Three flat portions of gray metal are present within the container averaging 0.9 x 0.2 x 0.1 cm.  The tissue is submitted entirely in blocks 1A-1H.  LDP          Microscopic Description       The slides are reviewed and demonstrate histopathologic features supporting the above rendered diagnosis.             Assessment & Plan   Rmsf  Tremor.   Had abx. Unfortunately tremor not better. Labs trending up.      On b complex and b12.          Diagnoses and all orders for this visit:    1. Need for influenza vaccination (Primary)  -     Fluzone High-Dose 65+yrs    2. Panlobular emphysema (HCC)  -     Lipid Panel  -     CBC & Differential  -      Vitamin B12  -     Comprehensive Metabolic Panel  -     TSH  -     T4, Free  -     [place] Spotted Fever, IgM  -     [place] Spotted Fever, IgG  -     Vitamin B6  -     Overnight Sleep Oximetry Study    3. RMSF ([place] spotted fever)  -     Lipid Panel  -     CBC & Differential  -     Vitamin B12  -     Comprehensive Metabolic Panel  -     TSH  -     T4, Free  -     [place] Spotted Fever, IgM  -     [place] Spotted Fever, IgG  -     Vitamin B6  -     Overnight Sleep Oximetry Study    4. Vitamin B12 deficiency  -     Lipid Panel  -     CBC & Differential  -     Vitamin B12  -     Comprehensive Metabolic Panel  -     TSH  -     T4, Free  -     [place] Spotted Fever, IgM  -     [place] Spotted Fever, IgG  -     Vitamin B6  -     Overnight Sleep Oximetry Study    5. Vitamin B6 deficiency  -     Lipid Panel  -     CBC & Differential  -     Vitamin B12  -     Comprehensive Metabolic Panel  -     TSH  -     T4, Free  -     [place] Spotted Fever, IgM  -     [place] Spotted Fever, IgG  -     Vitamin B6  -     Overnight Sleep Oximetry Study    6. Tremor  -     Lipid Panel  -     CBC & Differential  -     Vitamin B12  -     Comprehensive Metabolic Panel  -     TSH  -     T4, Free  -     [place] Spotted Fever, IgM  -     [place] Spotted Fever, IgG  -     Vitamin B6  -     Overnight Sleep Oximetry Study    7. Hypercholesteremia  -     Lipid Panel  -     CBC & Differential  -     Vitamin B12  -     Comprehensive Metabolic Panel  -     TSH  -     T4, Free  -     [place] Spotted Fever, IgM  -     [place] Spotted Fever, IgG  -     Vitamin B6  -     Overnight Sleep Oximetry Study      Return in about 6 weeks (around 11/15/2022).          There are no Patient Instructions on file for this visit.     Ron Velazquez MD    Assessment & Plan

## 2022-10-05 ENCOUNTER — LAB (OUTPATIENT)
Dept: LAB | Facility: HOSPITAL | Age: 82
End: 2022-10-05

## 2022-10-05 LAB
ALBUMIN SERPL-MCNC: 4 G/DL (ref 3.5–5.2)
ALBUMIN/GLOB SERPL: 1.4 G/DL
ALP SERPL-CCNC: 111 U/L (ref 39–117)
ALT SERPL W P-5'-P-CCNC: 44 U/L (ref 1–41)
ANION GAP SERPL CALCULATED.3IONS-SCNC: 11.3 MMOL/L (ref 5–15)
AST SERPL-CCNC: 27 U/L (ref 1–40)
BASOPHILS # BLD AUTO: 0.06 10*3/MM3 (ref 0–0.2)
BASOPHILS NFR BLD AUTO: 0.7 % (ref 0–1.5)
BILIRUB SERPL-MCNC: 0.4 MG/DL (ref 0–1.2)
BUN SERPL-MCNC: 13 MG/DL (ref 8–23)
BUN/CREAT SERPL: 10.9 (ref 7–25)
CALCIUM SPEC-SCNC: 9.5 MG/DL (ref 8.6–10.5)
CHLORIDE SERPL-SCNC: 107 MMOL/L (ref 98–107)
CHOLEST SERPL-MCNC: 180 MG/DL (ref 0–200)
CO2 SERPL-SCNC: 23.7 MMOL/L (ref 22–29)
CREAT SERPL-MCNC: 1.19 MG/DL (ref 0.76–1.27)
DEPRECATED RDW RBC AUTO: 43.3 FL (ref 37–54)
EGFRCR SERPLBLD CKD-EPI 2021: 61.8 ML/MIN/1.73
EOSINOPHIL # BLD AUTO: 0.29 10*3/MM3 (ref 0–0.4)
EOSINOPHIL NFR BLD AUTO: 3.5 % (ref 0.3–6.2)
ERYTHROCYTE [DISTWIDTH] IN BLOOD BY AUTOMATED COUNT: 13.7 % (ref 12.3–15.4)
GLOBULIN UR ELPH-MCNC: 2.9 GM/DL
GLUCOSE SERPL-MCNC: 122 MG/DL (ref 65–99)
HCT VFR BLD AUTO: 47.8 % (ref 37.5–51)
HDLC SERPL-MCNC: 38 MG/DL (ref 40–60)
HGB BLD-MCNC: 16.2 G/DL (ref 13–17.7)
IMM GRANULOCYTES # BLD AUTO: 0.02 10*3/MM3 (ref 0–0.05)
IMM GRANULOCYTES NFR BLD AUTO: 0.2 % (ref 0–0.5)
LDLC SERPL CALC-MCNC: 116 MG/DL (ref 0–100)
LDLC/HDLC SERPL: 2.96 {RATIO}
LYMPHOCYTES # BLD AUTO: 1.81 10*3/MM3 (ref 0.7–3.1)
LYMPHOCYTES NFR BLD AUTO: 22.1 % (ref 19.6–45.3)
MCH RBC QN AUTO: 29.6 PG (ref 26.6–33)
MCHC RBC AUTO-ENTMCNC: 33.9 G/DL (ref 31.5–35.7)
MCV RBC AUTO: 87.2 FL (ref 79–97)
MONOCYTES # BLD AUTO: 0.56 10*3/MM3 (ref 0.1–0.9)
MONOCYTES NFR BLD AUTO: 6.8 % (ref 5–12)
NEUTROPHILS NFR BLD AUTO: 5.45 10*3/MM3 (ref 1.7–7)
NEUTROPHILS NFR BLD AUTO: 66.7 % (ref 42.7–76)
NRBC BLD AUTO-RTO: 0 /100 WBC (ref 0–0.2)
PLATELET # BLD AUTO: 256 10*3/MM3 (ref 140–450)
PMV BLD AUTO: 9.7 FL (ref 6–12)
POTASSIUM SERPL-SCNC: 4 MMOL/L (ref 3.5–5.2)
PROT SERPL-MCNC: 6.9 G/DL (ref 6–8.5)
RBC # BLD AUTO: 5.48 10*6/MM3 (ref 4.14–5.8)
SODIUM SERPL-SCNC: 142 MMOL/L (ref 136–145)
T4 FREE SERPL-MCNC: 1.33 NG/DL (ref 0.93–1.7)
TRIGL SERPL-MCNC: 147 MG/DL (ref 0–150)
TSH SERPL DL<=0.05 MIU/L-ACNC: 1.04 UIU/ML (ref 0.27–4.2)
VIT B12 BLD-MCNC: 241 PG/ML (ref 211–946)
VLDLC SERPL-MCNC: 26 MG/DL (ref 5–40)
WBC NRBC COR # BLD: 8.19 10*3/MM3 (ref 3.4–10.8)

## 2022-10-05 PROCEDURE — 86757 RICKETTSIA ANTIBODY: CPT | Performed by: INTERNAL MEDICINE

## 2022-10-05 PROCEDURE — 85025 COMPLETE CBC W/AUTO DIFF WBC: CPT | Performed by: INTERNAL MEDICINE

## 2022-10-05 PROCEDURE — 84439 ASSAY OF FREE THYROXINE: CPT | Performed by: INTERNAL MEDICINE

## 2022-10-05 PROCEDURE — 84443 ASSAY THYROID STIM HORMONE: CPT | Performed by: INTERNAL MEDICINE

## 2022-10-05 PROCEDURE — 80053 COMPREHEN METABOLIC PANEL: CPT | Performed by: INTERNAL MEDICINE

## 2022-10-05 PROCEDURE — 84207 ASSAY OF VITAMIN B-6: CPT | Performed by: INTERNAL MEDICINE

## 2022-10-05 PROCEDURE — 36415 COLL VENOUS BLD VENIPUNCTURE: CPT | Performed by: INTERNAL MEDICINE

## 2022-10-05 PROCEDURE — 82607 VITAMIN B-12: CPT | Performed by: INTERNAL MEDICINE

## 2022-10-05 PROCEDURE — 80061 LIPID PANEL: CPT | Performed by: INTERNAL MEDICINE

## 2022-10-06 ENCOUNTER — TELEPHONE (OUTPATIENT)
Dept: UROLOGY | Facility: CLINIC | Age: 82
End: 2022-10-06

## 2022-10-06 LAB — R RICKETTSI IGM SER-ACNC: 1.54 INDEX (ref 0–0.89)

## 2022-10-06 NOTE — TELEPHONE ENCOUNTER
Called patient back with surgical pathology from TURP, this demonstrates [place] 3+3 with 6 (grade Group 1) very low risk prostate cancer in less than 1% of the submitted specimen from TURP.  Patient is urinating very well.  Discussed that low risk prostate cancer is unlikely to spread or cause him issues in his lifetime and we will likely do nothing about this.  We can consider MRI prostate within the next year to make sure were not missing anything concerning but his preoperative PSA was around 1.  Given his age, his very low PSA level, very low PSA density, and very low amount of low risk cancer found, we will likely perform watchful waiting regarding this low-grade prostate cancer which is unlikely to cause him issues.  Patient is in agreement.    Torsten Hawley MD

## 2022-10-07 DIAGNOSIS — Z12.5 PROSTATE CANCER SCREENING: Primary | ICD-10-CM

## 2022-10-07 DIAGNOSIS — J43.9 PULMONARY EMPHYSEMA, UNSPECIFIED EMPHYSEMA TYPE: Primary | ICD-10-CM

## 2022-10-07 LAB — VIT B6 SERPL-MCNC: 8.3 UG/L (ref 3.4–65.2)

## 2022-10-07 NOTE — PROGRESS NOTES
Please let them know Dr Hawley sent me a message and he is following this.   He has a f/u with Dr Hawley in November.

## 2022-10-08 LAB — R RICKETTSI IGG SER QL IA: NEGATIVE

## 2022-10-27 ENCOUNTER — PREP FOR SURGERY (OUTPATIENT)
Dept: OTHER | Facility: HOSPITAL | Age: 82
End: 2022-10-27

## 2022-10-27 DIAGNOSIS — H25.11 NUCLEAR SCLEROTIC CATARACT OF RIGHT EYE: Primary | ICD-10-CM

## 2022-10-27 RX ORDER — CYCLOPENT/TROPIC/PHEN/KETR/WAT 1%-1%-2.5%
1 DROPS (EA) OPHTHALMIC (EYE)
Status: CANCELLED | OUTPATIENT
Start: 2022-10-27 | End: 2022-10-27

## 2022-10-27 RX ORDER — SODIUM CHLORIDE 0.9 % (FLUSH) 0.9 %
10 SYRINGE (ML) INJECTION EVERY 12 HOURS SCHEDULED
Status: CANCELLED | OUTPATIENT
Start: 2022-10-27

## 2022-10-27 RX ORDER — TETRACAINE HYDROCHLORIDE 5 MG/ML
1 SOLUTION OPHTHALMIC SEE ADMIN INSTRUCTIONS
Status: CANCELLED | OUTPATIENT
Start: 2022-10-27

## 2022-10-27 RX ORDER — PREDNISOLONE ACETATE 10 MG/ML
1 SUSPENSION/ DROPS OPHTHALMIC SEE ADMIN INSTRUCTIONS
Status: CANCELLED | OUTPATIENT
Start: 2022-10-27

## 2022-10-27 RX ORDER — SODIUM CHLORIDE 0.9 % (FLUSH) 0.9 %
1-10 SYRINGE (ML) INJECTION AS NEEDED
Status: CANCELLED | OUTPATIENT
Start: 2022-10-27

## 2022-10-31 PROBLEM — H25.11 NUCLEAR SCLEROTIC CATARACT OF RIGHT EYE: Status: ACTIVE | Noted: 2022-10-31

## 2022-11-07 ENCOUNTER — ANESTHESIA EVENT (OUTPATIENT)
Dept: PERIOP | Facility: HOSPITAL | Age: 82
End: 2022-11-07

## 2022-11-07 ENCOUNTER — HOSPITAL ENCOUNTER (OUTPATIENT)
Facility: HOSPITAL | Age: 82
Setting detail: HOSPITAL OUTPATIENT SURGERY
Discharge: HOME | End: 2022-11-07
Attending: OPHTHALMOLOGY | Admitting: OPHTHALMOLOGY

## 2022-11-07 ENCOUNTER — ANESTHESIA (OUTPATIENT)
Dept: PERIOP | Facility: HOSPITAL | Age: 82
End: 2022-11-07

## 2022-11-07 VITALS
HEART RATE: 66 BPM | OXYGEN SATURATION: 98 % | HEIGHT: 70 IN | SYSTOLIC BLOOD PRESSURE: 145 MMHG | WEIGHT: 215 LBS | TEMPERATURE: 97.7 F | DIASTOLIC BLOOD PRESSURE: 72 MMHG | BODY MASS INDEX: 30.78 KG/M2 | RESPIRATION RATE: 18 BRPM

## 2022-11-07 DIAGNOSIS — H25.11 NUCLEAR SCLEROTIC CATARACT OF RIGHT EYE: ICD-10-CM

## 2022-11-07 PROCEDURE — 25010000002 MIDAZOLAM PER 1MG: Performed by: NURSE ANESTHETIST, CERTIFIED REGISTERED

## 2022-11-07 PROCEDURE — V2632 POST CHMBR INTRAOCULAR LENS: HCPCS | Performed by: OPHTHALMOLOGY

## 2022-11-07 PROCEDURE — 25010000002 PROPOFOL 200 MG/20ML EMULSION: Performed by: NURSE ANESTHETIST, CERTIFIED REGISTERED

## 2022-11-07 DEVICE — LENS MONOFOCAL IQ CC60WF235: Type: IMPLANTABLE DEVICE | Site: EYE | Status: FUNCTIONAL

## 2022-11-07 RX ORDER — LIDOCAINE HYDROCHLORIDE 20 MG/ML
INJECTION, SOLUTION EPIDURAL; INFILTRATION; INTRACAUDAL; PERINEURAL AS NEEDED
Status: DISCONTINUED | OUTPATIENT
Start: 2022-11-07 | End: 2022-11-07 | Stop reason: HOSPADM

## 2022-11-07 RX ORDER — CYCLOPENT/TROPIC/PHEN/KETR/WAT 1%-1%-2.5%
1 DROPS (EA) OPHTHALMIC (EYE)
Status: COMPLETED | OUTPATIENT
Start: 2022-11-07 | End: 2022-11-07

## 2022-11-07 RX ORDER — SODIUM CHLORIDE 0.9 % (FLUSH) 0.9 %
10 SYRINGE (ML) INJECTION AS NEEDED
Status: DISCONTINUED | OUTPATIENT
Start: 2022-11-07 | End: 2022-11-07 | Stop reason: HOSPADM

## 2022-11-07 RX ORDER — SODIUM CHLORIDE, SODIUM LACTATE, POTASSIUM CHLORIDE, CALCIUM CHLORIDE 600; 310; 30; 20 MG/100ML; MG/100ML; MG/100ML; MG/100ML
1000 INJECTION, SOLUTION INTRAVENOUS CONTINUOUS
Status: DISCONTINUED | OUTPATIENT
Start: 2022-11-07 | End: 2022-11-07 | Stop reason: HOSPADM

## 2022-11-07 RX ORDER — SODIUM CHLORIDE 0.9 % (FLUSH) 0.9 %
10 SYRINGE (ML) INJECTION EVERY 12 HOURS SCHEDULED
Status: DISCONTINUED | OUTPATIENT
Start: 2022-11-07 | End: 2022-11-07 | Stop reason: HOSPADM

## 2022-11-07 RX ORDER — PREDNISOLONE ACETATE 10 MG/ML
1 SUSPENSION/ DROPS OPHTHALMIC SEE ADMIN INSTRUCTIONS
Status: DISCONTINUED | OUTPATIENT
Start: 2022-11-07 | End: 2022-11-07 | Stop reason: HOSPADM

## 2022-11-07 RX ORDER — MIDAZOLAM HYDROCHLORIDE 2 MG/2ML
INJECTION, SOLUTION INTRAMUSCULAR; INTRAVENOUS AS NEEDED
Status: DISCONTINUED | OUTPATIENT
Start: 2022-11-07 | End: 2022-11-07 | Stop reason: SURG

## 2022-11-07 RX ORDER — SODIUM CHLORIDE 0.9 % (FLUSH) 0.9 %
1-10 SYRINGE (ML) INJECTION AS NEEDED
Status: DISCONTINUED | OUTPATIENT
Start: 2022-11-07 | End: 2022-11-07 | Stop reason: HOSPADM

## 2022-11-07 RX ORDER — NEOMYCIN SULFATE, POLYMYXIN B SULFATE, AND DEXAMETHASONE 3.5; 10000; 1 MG/G; [USP'U]/G; MG/G
OINTMENT OPHTHALMIC AS NEEDED
Status: DISCONTINUED | OUTPATIENT
Start: 2022-11-07 | End: 2022-11-07 | Stop reason: HOSPADM

## 2022-11-07 RX ORDER — ACETAZOLAMIDE 500 MG/1
500 CAPSULE, EXTENDED RELEASE ORAL ONCE
Status: COMPLETED | OUTPATIENT
Start: 2022-11-07 | End: 2022-11-07

## 2022-11-07 RX ORDER — PROPOFOL 10 MG/ML
INJECTION, EMULSION INTRAVENOUS AS NEEDED
Status: DISCONTINUED | OUTPATIENT
Start: 2022-11-07 | End: 2022-11-07 | Stop reason: SURG

## 2022-11-07 RX ORDER — KETAMINE HYDROCHLORIDE 50 MG/ML
INJECTION, SOLUTION, CONCENTRATE INTRAMUSCULAR; INTRAVENOUS AS NEEDED
Status: DISCONTINUED | OUTPATIENT
Start: 2022-11-07 | End: 2022-11-07 | Stop reason: SURG

## 2022-11-07 RX ORDER — PREDNISOLONE ACETATE 10 MG/ML
SUSPENSION/ DROPS OPHTHALMIC AS NEEDED
Status: DISCONTINUED | OUTPATIENT
Start: 2022-11-07 | End: 2022-11-07 | Stop reason: HOSPADM

## 2022-11-07 RX ORDER — TETRACAINE HYDROCHLORIDE 5 MG/ML
1 SOLUTION OPHTHALMIC SEE ADMIN INSTRUCTIONS
Status: COMPLETED | OUTPATIENT
Start: 2022-11-07 | End: 2022-11-07

## 2022-11-07 RX ORDER — BALANCED SALT SOLUTION 6.4; .75; .48; .3; 3.9; 1.7 MG/ML; MG/ML; MG/ML; MG/ML; MG/ML; MG/ML
SOLUTION OPHTHALMIC AS NEEDED
Status: DISCONTINUED | OUTPATIENT
Start: 2022-11-07 | End: 2022-11-07 | Stop reason: HOSPADM

## 2022-11-07 RX ORDER — TETRACAINE HYDROCHLORIDE 5 MG/ML
SOLUTION OPHTHALMIC AS NEEDED
Status: DISCONTINUED | OUTPATIENT
Start: 2022-11-07 | End: 2022-11-07 | Stop reason: HOSPADM

## 2022-11-07 RX ADMIN — ACETAZOLAMIDE 500 MG: 500 CAPSULE, EXTENDED RELEASE ORAL at 11:02

## 2022-11-07 RX ADMIN — MIDAZOLAM HYDROCHLORIDE 2 MG: 1 INJECTION, SOLUTION INTRAMUSCULAR; INTRAVENOUS at 10:33

## 2022-11-07 RX ADMIN — Medication 1 DROP: at 09:27

## 2022-11-07 RX ADMIN — TETRACAINE HYDROCHLORIDE 1 DROP: 5 SOLUTION OPHTHALMIC at 09:26

## 2022-11-07 RX ADMIN — KETAMINE HYDROCHLORIDE 10 MG: 50 INJECTION, SOLUTION INTRAMUSCULAR; INTRAVENOUS at 10:33

## 2022-11-07 RX ADMIN — TETRACAINE HYDROCHLORIDE 1 DROP: 5 SOLUTION OPHTHALMIC at 09:25

## 2022-11-07 RX ADMIN — SODIUM CHLORIDE, POTASSIUM CHLORIDE, SODIUM LACTATE AND CALCIUM CHLORIDE 1000 ML: 600; 310; 30; 20 INJECTION, SOLUTION INTRAVENOUS at 09:37

## 2022-11-07 RX ADMIN — Medication 1 DROP: at 09:32

## 2022-11-07 RX ADMIN — PROPOFOL 20 MG: 10 INJECTION, EMULSION INTRAVENOUS at 10:33

## 2022-11-07 RX ADMIN — Medication 1 DROP: at 09:37

## 2022-11-07 NOTE — ANESTHESIA POSTPROCEDURE EVALUATION
Patient: Cipriano Montes    Procedure Summary     Date: 11/07/22 Room / Location: [facility] OR 1 /  [facility] OR    Anesthesia Start: 1031 Anesthesia Stop: 1050    Procedure: CATARACT PHACO EXTRACTION WITH INTRAOCULAR LENS IMPLANT RIGHT (Right: Eye) Diagnosis:       Nuclear sclerotic cataract of right eye      (Nuclear sclerotic cataract of right eye [H25.11])    Surgeons: Spencer Mccollum MD Provider: Kevin Rodriguez CRNA    Anesthesia Type: MAC ASA Status: 2          Anesthesia Type: MAC    Vitals  Vitals Value Taken Time   /77 11/07/22 1052   Temp 97.7 °F (36.5 °C) 11/07/22 1052   Pulse 64 11/07/22 1052   Resp 18 11/07/22 1052   SpO2 97 % 11/07/22 1052           Post Anesthesia Care and Evaluation    Patient location during evaluation: PHASE II  Patient participation: complete - patient participated  Level of consciousness: awake and alert  Pain management: adequate    Airway patency: patent  Anesthetic complications: No anesthetic complications  PONV Status: none  Cardiovascular status: acceptable  Respiratory status: acceptable  Hydration status: acceptable  No anesthesia care post op

## 2022-11-07 NOTE — OP NOTE
OPERATIVE NOTE    Date of Procedure: 11/7/2022  Patient Name: Cipriano Montes  Patient MRN: [id number]  YOB: 1940     Preoperative Diagnosis: Right nuclear sclerotic cataract.     Postoperative Diagnosis: Right nuclear sclerotic cataract.     Procedure Performed: Phacoemulsification with implantation of a  foldable posterior chamber intraocular lens, Right eye.     Surgeon: Spencer Mccollum MD     Anesthesia:  Monitored Anesthesia Care (MAC)      Brief History and Indication: The patient presents with a history of past progressive loss of vision.  Patient was diagnosed with cataract and requests removal for increased ability to read and see.     Operation Description: The patient was taken to the OR and prepped and draped in the usual sterile ophthalmic fashion. A lid speculum was placed in the eye.  A #75 blade was then used to make a stab incision two o’clock hours from the intended temporal clear cornea groove. The anterior chamber was then inflated with a Viscoelastic. A metal microkeratome blade was then used to enter the anterior chamber at the temporal clear cornea site. A three level tunnel incision was made. A curvilinear capsulorrhexis was then performed with a bent cystotome needle and capsulorrhexis forceps.  BSS on a 30 gauge bent cannula was used to hydro-dissect, and hydro-delineate the lens. Good fluid waves and hydro-delineation were noted. Phacoemulsification was then used to remove nuclear material without complications. The residual cortical and lenticular material was then removed with irrigation and aspiration. Viscoelastics were then used to inflate the bag in a soft shell technique. A PCIOL was injected into the bag. Post-implantation, there were no rents or tears in the bag and the lens was noted to be stable and centered. The residual Viscoelastic was then removed with irrigation and aspiration.  The wound was checked and found to be without leaks. Therefore a Polydex  ointment and one drop of Durezol eye drop was placed in the eye.     Implant Information:   Implant Name Type Inv. Item Serial No.  Lot No. LRB No. Used Action   LENS MONOFOCAL IQ [id number] - [id number] - [id number] Implant LENS MONOFOCAL IQ [id number] [employer]  Right 1 Implanted       Complications: None    Estimated Blood Loss:  Less than 1 cc.       []   Changed to complex procedure due to: []  hypermature, white cataract. Blue dye was used. []   small iris. A Malyugin Ring was used.    Discharge and Condition  The patient was transported to same day surgery in excellent condition and scheduled for follow-up tomorrow morning. The patient was given instructions on use of eye drops for the operative eye and was specifically instructed to call Dr. Mccollum at his office or home for any nausea, vomiting, headache, decreased visual acuity, or pain, or if the patient had any general concerns.    Spencer Mccollum MD  11/7/2022

## 2022-11-07 NOTE — H&P
"      [facility]         History and Physical    Patient Name: Cipriano Montes  MRN: [id number]  : 1940  Gender: male     HPI: Patient complaint of PPLOV Right eye diagnosed with cataract. Patient requests PHACO PCIOL for Increase of VA/ADL.    History:    Past Medical History:   Diagnosis Date   • Allergies    • Anemia    • Arthritis    • Asthma    • Cataracts, bilateral    • Depression    • Enlarged prostate    • History of Clostridioides difficile infection    • Hypertension    • Hypothyroidism    • Kidney stones    • Peripheral neuropathy    • Thyroid disease    • Thyroid disorder        Past Surgical History:   Procedure Laterality Date   • ANKLE SURGERY Right     ankle/foot fracture surgery   • COLON SURGERY      colon resection   • COLONOSCOPY     • CYSTOSCOPY TRANSURETHRAL RESECTION OF PROSTATE N/A 2022    Procedure: CYSTOSCOPY TRANSURETHRAL RESECTION OF PROSTATE (BIPOLAR);  Surgeon: Torsten Hawley MD;  Location: [facility];  Service: Urology;  Laterality: N/A;   • PROSTATE SURGERY      \"uralift\" surgery   • TONSILLECTOMY         Social History     Socioeconomic History   • Marital status:    Tobacco Use   • Smoking status: Never   • Smokeless tobacco: Never   Vaping Use   • Vaping Use: Never used   Substance and Sexual Activity   • Alcohol use: Not Currently   • Drug use: No   • Sexual activity: Defer       Family History   Problem Relation Age of Onset   • Cancer Other    • Diabetes Other    • Prostate cancer Other    • Leukemia Mother    • Diabetes Father        Prior to Admission Medications:  Medications Prior to Admission   Medication Sig Dispense Refill Last Dose   • acetaminophen (TYLENOL) 650 MG 8 hr tablet Every 8 (Eight) Hours As Needed.   2022   • B Complex-C (B-complex with vitamin C) tablet Take 1 tablet by mouth Daily. 90 tablet 3 2022   • bacitracin-polymyxin b (POLYSPORIN) 500-35304 UNIT/GM ointment Apply to catheter and penis tip " twice daily 30 g 0 11/6/2022   • Fluticasone Furoate-Vilanterol (Breo Ellipta) 100-25 MCG/INH inhaler Inhale 1 puff Daily. 60 each 3 11/6/2022   • Hyoscyamine Sulfate SL (Levsin/SL) 0.125 MG sublingual tablet Place 1 tablet under the tongue Every 4 (Four) Hours As Needed (Breakthrough bladder spasms). 30 each 1 11/6/2022   • levothyroxine (SYNTHROID, LEVOTHROID) 100 MCG tablet TAKE 1 TABLET BY MOUTH DAILY 90 tablet 3 11/7/2022   • NIFEdipine XL (PROCARDIA XL) 60 MG 24 hr tablet TAKE 1 TABLET BY MOUTH DAILY 90 tablet 3 11/7/2022   • oxybutynin XL (DITROPAN-XL) 5 MG 24 hr tablet Take 1 tablet by mouth Daily. To prevent bladder spasms 30 tablet 0 11/6/2022   • Probiotic Product (PROBIOTIC-10 PO) Probiotic   1 capsule daily   11/6/2022   • vitamin B-12 (CYANOCOBALAMIN) 1000 MCG tablet Take 100 mcg by mouth Daily.   11/7/2022       Allergies:  Allergies   Allergen Reactions   • Oxycodone-Acetaminophen Nausea Only and GI Intolerance        Vitals: Temp:  [97.5 °F (36.4 °C)] 97.5 °F (36.4 °C)  Heart Rate:  [74] 74  Resp:  [18] 18  BP: (165)/(79) 165/79    Review of Systems:   Within Normal Limits Abnormal   HEENT [x]    []     Cardiovascular [x]   []     Gastrointestinal [x]   []     Genitourinary [x]   []     Neurologic [x]   []     Pulmonary [x]   []       Physical Exam:   Within Normal Limits Abnormal   HEENT [x]    []     Heart [x]   []     Lungs [x]   []     Abdomen [x]   []     Extremities [x]   []       Impression: Right nuclear sclerotic cataract.     Plan: CATARACT PHACO EXTRACTION WITH INTRAOCULAR LENS IMPLANT RIGHT (Right)     Spencer Mccollum MD  11/7/2022

## 2022-11-07 NOTE — ANESTHESIA PREPROCEDURE EVALUATION
Anesthesia Evaluation     Patient summary reviewed and Nursing notes reviewed   no history of anesthetic complications:  NPO Solid Status: > 8 hours  NPO Liquid Status: > 8 hours           Airway   Mallampati: II  TM distance: >3 FB  Neck ROM: full  No difficulty expected  Dental - normal exam     Pulmonary - normal exam    breath sounds clear to auscultation  (+) asthma (Controlled),  Cardiovascular - normal exam    ECG reviewed  Rhythm: regular  Rate: normal    (+) hypertension, hyperlipidemia,       Neuro/Psych  (+) psychiatric history Anxiety and Depression,    GI/Hepatic/Renal/Endo    (+) obesity,   renal disease (Obstructive uropathy from BPH), thyroid problem hypothyroidism    Musculoskeletal     Abdominal    Substance History      OB/GYN          Other   arthritis,                        Anesthesia Plan    ASA 2     MAC     intravenous induction     Anesthetic plan, risks, benefits, and alternatives have been provided, discussed and informed consent has been obtained with: patient.  Pre-procedure education provided      CODE STATUS:

## 2022-11-07 NOTE — DISCHARGE INSTRUCTIONS
Post Operative Cataract Instructions     Right Eye  POST OPERATIVE INSTRUCTIONS  You have received anesthesia today. DO NOT drive, drink alcohol, sign legal documents.   After surgery, your eye will not hurt. It may feel scratchy, sticky or uncomfortable. Your eye will be sensitive to light.  Most people see better 1-3 days after the procedure, but it could take 3 weeks to get the full benefits and reach your visual potential. If your vision is blurry for a few days it is normal, and means you may have swelling outside or inside the eye. For some patients, a bubble is placed and there will be blurriness.   You should receive a post-op kit with tape and an eye shield. Wear the shield for the first 3 nights after surgery to keep you from rubbing the eye.  You may wear glasses or sunglasses during the day.  You must keep eye protected at all times.  Most people are able to return to their normal routine 1-3 days after surgery, however, due to the brain adjusting to your new vision you may have trouble judging distances and want to be careful when driving and going up and downstairs.   You can shower and wash your hair the day after surgery. Keep water, shampoo, hair spray and shaving lotion out of the eye, especially for the first week.   If eyes become stuck together after surgery you may soak with warm cloth.  During the first week, you should AVOID:   Rubbing or putting pressure on your eye.  Eye make-up, face cream or lotion, hair coloring or perms  Strenuous activities, such as running or lifting weights, as to avoid sweat from getting in the eye. Avoid swimming, hot tubs, fumes or lm conditions.   Sleeping on operative side.  Excessive sneezing or coughing.  Hanging the head down for periods of time. Keep your head above your heart.    Some discomfort and blurred vision after surgery are normal, but if you have any unusual pain, swelling, bleeding or sudden decrease in vision, contact our office immediately.      Emergency assistance is available at any time by calling:    Dr.Mark Veda Mccollum  [phone]    If unable to reach call [facility] @ [phone]    You have been prescribed an eye drop to use after surgery, please follow these instructions and bring eye drops and instructions with you to post op appointment:    Difluprednate (DUREZOL) 0.05 %. Shake well before use.    USE 1 DROP 4 (FOUR) TIMES A DAY FOR 1 WEEK THEN STARTING WEEK 2, ONLY USE 2 (TWO) TIMES A DAY UNTIL YOU RUN OUT OF DROPS.     PLACE A HAWA IN THE DAY COLUMN EACH TIME YOU USE TO KEEP ON SCHEDULE    WEEK 1-USE 4  (FOUR) TIMES A DAY DAY 1  []   []    []   []     DAY 2  []   []    []   []  DAY 3  []   []    []   []  DAY 4  []   []    []   []  DAY 5  []   []    []   []  DAY 6  []   []    []   []  DAY 7  []   []    []   []      WEEK 2-USE 2 (TWO)  TIMES A DAY DAY 1  []   []  DAY 2  []   []  DAY 3  []   []  DAY 4  []   []  DAY 5  []   []  DAY 6  []   []  DAY 7  []   []      WEEK 3-USE 2 (TWO) TIMES A DAY DAY 1  []   []  DAY 2  []   []  DAY 3  []   []  DAY 4  []   []  DAY 5  []   []  DAY 6  []   []  DAY 7  []   []      WEEK 4-USE 2 (TWO)TIMES A DAY DAY 1  []   []  DAY 2  []   []  DAY 3  []   []  DAY 4  []   []  DAY 5  []   []  DAY 6  []   []  DAY 7  []   []       Please follow all post op instructions and follow up appointment time from your physician's office included in your discharge packet.  .  No pushing, pulling, tugging,  heavy lifting, or strenuous activity.  No major decision making, driving, or drinking alcoholic beverages for 24 hours. ( due to the medications you have  received)  Always use good hand hygiene/washing techniques.  NO driving while taking pain medications.    * if you have an incision:  Check your incision area every day for signs of infection.   Check for:  * more redness, swelling, or pain  *more fluid or blood  *warmth  *pus or bad smell     To  assist you in voiding:  Drink plenty of fluids  Listen to running water while attempting to void.    If you are unable to urinate and you have an uncomfortable urge to void or it has been   6 hours since you were discharged, return to the Emergency Room

## 2022-11-10 ENCOUNTER — OFFICE VISIT (OUTPATIENT)
Dept: UROLOGY | Facility: CLINIC | Age: 82
End: 2022-11-10

## 2022-11-10 VITALS — BODY MASS INDEX: 30.78 KG/M2 | HEIGHT: 70 IN | WEIGHT: 215 LBS

## 2022-11-10 DIAGNOSIS — N40.1 BENIGN PROSTATIC HYPERPLASIA WITH URINARY OBSTRUCTION: ICD-10-CM

## 2022-11-10 DIAGNOSIS — R82.81 PYURIA: Primary | ICD-10-CM

## 2022-11-10 DIAGNOSIS — N13.8 BENIGN PROSTATIC HYPERPLASIA WITH URINARY OBSTRUCTION: ICD-10-CM

## 2022-11-10 DIAGNOSIS — N39.41 URGENCY INCONTINENCE: ICD-10-CM

## 2022-11-10 PROCEDURE — 87086 URINE CULTURE/COLONY COUNT: CPT | Performed by: STUDENT IN AN ORGANIZED HEALTH CARE EDUCATION/TRAINING PROGRAM

## 2022-11-10 PROCEDURE — 51798 US URINE CAPACITY MEASURE: CPT | Performed by: STUDENT IN AN ORGANIZED HEALTH CARE EDUCATION/TRAINING PROGRAM

## 2022-11-10 PROCEDURE — 81003 URINALYSIS AUTO W/O SCOPE: CPT | Performed by: STUDENT IN AN ORGANIZED HEALTH CARE EDUCATION/TRAINING PROGRAM

## 2022-11-10 PROCEDURE — 99024 POSTOP FOLLOW-UP VISIT: CPT | Performed by: STUDENT IN AN ORGANIZED HEALTH CARE EDUCATION/TRAINING PROGRAM

## 2022-11-10 NOTE — PROGRESS NOTES
"     Follow Up Office Visit      Patient Name: Cipriano Montes  : 1940   MRN: [id number]     Chief Complaint:    Chief Complaint   Patient presents with   • Benign Prostatic Hypertrophy       Referring Provider: No ref. provider found    History of Present Illness: Cipriano Montes is a 82 y.o. male who presents today for follow up of BPH with lower urinary tract symptoms and obstruction.  He was status post previous UroLift procedure.  He was found to have incomplete bladder emptying despite UroLift and underwent formal cystoscopy with transurethral resection of prostate 2022.  He presents today for 5-week follow-up.  His IPSS is improving and 11, he reports some sensation of incomplete bladder emptying and a abnormal sensation of still needing to urinate afterwards.  The patient is still healing from his procedure and we discussed that his inflammatory response is likely exacerbating the symptoms.  He stopped using the oxybutynin and Levsin.  He does not have any residual gross hematuria.  He still has some prostatic tissue sloughing in his urine which is expected.     His PVR is reassuring at 25 mL, he states he is peeing \"like a 17-year-old again\".  Is very pleased with his symptom control and improvement in his urinary stream strength.    He is having some mild urgency incontinence if he holds his bladder too long.     Patient has a small amount of Oz 3+3 equal 6, grade group 1 prostate cancer and a very tiny amount of specimen.  We have elected to perform watchful waiting regarding his low-grade prostate cancer which is unlikely to cause him issues, he did have a normal PSA trend prior to procedure.    Lab Results   Component Value Date    PSA 1.370 2022    PSA 1.550 2021    PSA 0.710 10/05/2020    PSA 0.80 10/22/2015    PSA 1.30 2015    PSA 0.50 2014           Subjective      Review of System: Review of Systems   Genitourinary: Positive for frequency.      I have reviewed " the ROS documented by my clinical staff, I have updated appropriately and I agree. Torsten Hawley MD    I have reviewed and the following portions of the patient's history were updated as appropriate: past family history, past medical history, past social history, past surgical history and problem list.    Medications:     Current Outpatient Medications:   •  acetaminophen (TYLENOL) 650 MG 8 hr tablet, Every 8 (Eight) Hours As Needed., Disp: , Rfl:   •  B Complex-C (B-complex with vitamin C) tablet, Take 1 tablet by mouth Daily., Disp: 90 tablet, Rfl: 3  •  bacitracin-polymyxin b (POLYSPORIN) 500-91252 UNIT/GM ointment, Apply to catheter and penis tip twice daily, Disp: 30 g, Rfl: 0  •  Fluticasone Furoate-Vilanterol (Breo Ellipta) 100-25 MCG/INH inhaler, Inhale 1 puff Daily., Disp: 60 each, Rfl: 3  •  levothyroxine (SYNTHROID, LEVOTHROID) 100 MCG tablet, TAKE 1 TABLET BY MOUTH DAILY, Disp: 90 tablet, Rfl: 3  •  NIFEdipine XL (PROCARDIA XL) 60 MG 24 hr tablet, TAKE 1 TABLET BY MOUTH DAILY, Disp: 90 tablet, Rfl: 3  •  Probiotic Product (PROBIOTIC-10 PO), Probiotic  1 capsule daily, Disp: , Rfl:   •  vitamin B-12 (CYANOCOBALAMIN) 1000 MCG tablet, Take 100 mcg by mouth Daily., Disp: , Rfl:   •  Mirabegron ER (MYRBETRIQ) 50 MG tablet sustained-release 24 hour 24 hr tablet, Take 50 mg by mouth Daily., Disp: 30 tablet, Rfl: 0    Allergies:   Allergies   Allergen Reactions   • Oxycodone-Acetaminophen Nausea Only and GI Intolerance       IPSS Questionnaire (AUA-7):  Over the past month…    1)  Incomplete Emptying:       How often have you had a sensation of not emptying you had the sensation of not emptying your bladder completely after you finished urinating?  4 - More than half the time   2)  Frequency:       How often have you had the urinate again less than two hours after you finished urinating?  2 - Less than half the time   3)  Intermittency:       How often have you found you stopped and started again several  times when you urinated?   2 - Less than half the time   4) Urgency:      How often have you found it difficult to postpone urination?  2 - Less than half the time   5) Weak Stream:      How often have you had a weak urinary stream?  0 - Not at all   6) Straining:       How often have you had to push or strain to begin urination?  0 - Not at all   7) Nocturia:      How many times did you most typically get up to urinate from the time you went to bed at night until the time you got up in the morning?  1 - 1 time   Total Score:  11   The International Prostate Symptom Score (IPSS) is used to screen, diagnose, track symptoms of benign prostatic hyperplasia (BPH).   0-7 (Mild Symptoms) 8-19 (Moderate) 20-35 (Severe)   Quality of Life (QoL):  If you were to spend the rest of your life with your urinary condition just the way it is now, how would you feel about that? 2-Mostly Satisfied   Urine Leakage (Incontinence) 0-No Leakage     Sexual Health Inventory for Men (WILBERT)   Over the past 6 months:     1. How do you rate your confidence that you could get and keep an erection?  0 - No sexual activity    2. When you had erections with sexual  stimulation, how often were your erections hard enough for penetration (entering your partner)?  0 - No Sexual Activity    3. During sexual intercourse, how often were you able to maintain your erection after you had penetrated (entered) your partner?  0 - Did not attempt intercourse   4. During sexual intercourse, how difficult was it to maintain your erection to completion of intercourse?  0 - Did not attempt intercourse   5. When you attempted sexual intercourse, how often was it satisfactory for you?  0 - Did not attempt intercourse    Total Score: 0   The Sexual Health Inventory for Men further classifies ED severity with the following breakpoints:   1-7 (Severe ED) 8-11 (Moderate ED) 12-16 (Mild to Moderate ED) 17-21 (Mild ED)      Post void residual bladder scan:   25 mL  "    Objective     Physical Exam:   Vital Signs:   Vitals:    11/10/22 0956   Weight: 97.5 kg (215 lb)   Height: 177.8 cm (70\")     Body mass index is 30.85 kg/m².     Physical Exam  Constitutional:       Appearance: Normal appearance.   HENT:      Head: Normocephalic and atraumatic.      Nose: Nose normal.   Eyes:      Extraocular Movements: Extraocular movements intact.      Conjunctiva/sclera: Conjunctivae normal.      Pupils: Pupils are equal, round, and reactive to light.   Musculoskeletal:         General: Normal range of motion.      Cervical back: Normal range of motion and neck supple.   Skin:     General: Skin is warm and dry.      Findings: No lesion or rash.   Neurological:      General: No focal deficit present.      Mental Status: He is alert and oriented to person, place, and time. Mental status is at baseline.   Psychiatric:         Mood and Affect: Mood normal.         Behavior: Behavior normal.         Labs:   Brief Urine Lab Results  (Last result in the past 365 days)      Color   Clarity   Blood   Leuk Est   Nitrite   Protein   CREAT   Urine HCG        11/10/22 1020 Yellow   Clear   3+   Trace   Negative   1+                 Urine Culture    Urine Culture 9/1/22 9/16/22   Urine Culture No growth >100,000 CFU/mL Staphylococcus epidermidis (A) (C)   (A) Abnormal value (C) Corrected value               Lab Results   Component Value Date    GLUCOSE 122 (H) 10/05/2022    CALCIUM 9.5 10/05/2022     10/05/2022    K 4.0 10/05/2022    CO2 23.7 10/05/2022     10/05/2022    BUN 13 10/05/2022    CREATININE 1.19 10/05/2022    EGFRIFAFRI 74 11/09/2020    EGFRIFNONA 62 09/07/2021    BCR 10.9 10/05/2022    ANIONGAP 11.3 10/05/2022       Lab Results   Component Value Date    WBC 8.19 10/05/2022    HGB 16.2 10/05/2022    HCT 47.8 10/05/2022    MCV 87.2 10/05/2022     10/05/2022       Images:   FL C Arm During Surgery    Result Date: 9/30/2022  FINDINGS/IMPRESSION: 12 seconds of fluoroscopy time " provided with 1 imaged toward during cystoscopy  This report was finalized on 9/30/2022 9:17 AM by Juan Carlos Pitts.        Measures:   Tobacco:   Cipriano Montes  reports that he has never smoked. He has never used smokeless tobacco.      Assessment / Plan      Assessment/Plan:   82 y.o. male who presented today for follow up of BPH and lower Jean tract symptoms status post UroLift followed by formal transurethral resection of prostate 9/30/2022.  He has done well after surgery.  We will stop his oxybutynin and Levsin.  Still complaining of some mild urgency, frequency and sensation of incomplete bladder emptying, this is likely as a result of continued healing after his procedure.  I will place him on 1 month of Myrbetriq for his urinary symptoms for better bladder control.  Discussed risk of hypertension associated with the medication.  I will see him in 3 months for follow-up and PVR.    The patient has leukocytes and blood on his UA today which is likely associated with continued healing but given his symptoms I will check a urine culture to make sure there is no smoldering infection that needs antibiotic treatment.  I will call him with results.    Diagnoses and all orders for this visit:    1. Pyuria (Primary)  -     Urine Culture - Urine, Urine, Clean Catch; Future    2. Benign prostatic hyperplasia with urinary obstruction  -     POC Urinalysis Dipstick, Automated    3. Urgency incontinence  -     Mirabegron ER (MYRBETRIQ) 50 MG tablet sustained-release 24 hour 24 hr tablet; Take 50 mg by mouth Daily.  Dispense: 30 tablet; Refill: 0           Follow Up:   Return in about 3 months (around 2/10/2023).    I spent approximately 20 minutes providing clinical care for this patient; including review of patient's chart and provider documentation, face to face time spent with patient in examination room (obtaining history, performing physical exam, discussing diagnosis and management options), placing orders, and  completing patient documentation.     Torsten Hawley MD  [facility] [place]

## 2022-11-11 ENCOUNTER — TELEPHONE (OUTPATIENT)
Dept: UROLOGY | Facility: CLINIC | Age: 82
End: 2022-11-11

## 2022-11-11 LAB — BACTERIA SPEC AEROBE CULT: NO GROWTH

## 2022-11-11 NOTE — TELEPHONE ENCOUNTER
Per Dr. aHwley, I called the patient to let him know that his culture results were negative for infection.

## 2022-11-14 ENCOUNTER — PREP FOR SURGERY (OUTPATIENT)
Dept: OTHER | Facility: HOSPITAL | Age: 82
End: 2022-11-14

## 2022-11-14 DIAGNOSIS — H25.12 NUCLEAR SCLEROTIC CATARACT OF LEFT EYE: Primary | ICD-10-CM

## 2022-11-14 RX ORDER — TETRACAINE HYDROCHLORIDE 5 MG/ML
1 SOLUTION OPHTHALMIC SEE ADMIN INSTRUCTIONS
Status: CANCELLED | OUTPATIENT
Start: 2022-11-14

## 2022-11-14 RX ORDER — SODIUM CHLORIDE 0.9 % (FLUSH) 0.9 %
1-10 SYRINGE (ML) INJECTION AS NEEDED
Status: CANCELLED | OUTPATIENT
Start: 2022-11-14

## 2022-11-14 RX ORDER — CYCLOPENT/TROPIC/PHEN/KETR/WAT 1%-1%-2.5%
1 DROPS (EA) OPHTHALMIC (EYE)
Status: CANCELLED | OUTPATIENT
Start: 2022-11-14 | End: 2022-11-14

## 2022-11-14 RX ORDER — SODIUM CHLORIDE 0.9 % (FLUSH) 0.9 %
10 SYRINGE (ML) INJECTION EVERY 12 HOURS SCHEDULED
Status: CANCELLED | OUTPATIENT
Start: 2022-11-14

## 2022-11-14 RX ORDER — PREDNISOLONE ACETATE 10 MG/ML
1 SUSPENSION/ DROPS OPHTHALMIC SEE ADMIN INSTRUCTIONS
Status: CANCELLED | OUTPATIENT
Start: 2022-11-14

## 2022-11-21 ENCOUNTER — HOSPITAL ENCOUNTER (OUTPATIENT)
Facility: HOSPITAL | Age: 82
Setting detail: HOSPITAL OUTPATIENT SURGERY
Discharge: HOME | End: 2022-11-21
Attending: OPHTHALMOLOGY | Admitting: OPHTHALMOLOGY

## 2022-11-21 ENCOUNTER — ANESTHESIA EVENT (OUTPATIENT)
Dept: PERIOP | Facility: HOSPITAL | Age: 82
End: 2022-11-21

## 2022-11-21 ENCOUNTER — ANESTHESIA (OUTPATIENT)
Dept: PERIOP | Facility: HOSPITAL | Age: 82
End: 2022-11-21

## 2022-11-21 VITALS
DIASTOLIC BLOOD PRESSURE: 80 MMHG | OXYGEN SATURATION: 96 % | TEMPERATURE: 98.2 F | SYSTOLIC BLOOD PRESSURE: 154 MMHG | HEART RATE: 63 BPM | RESPIRATION RATE: 18 BRPM

## 2022-11-21 DIAGNOSIS — H25.12 NUCLEAR SCLEROTIC CATARACT OF LEFT EYE: ICD-10-CM

## 2022-11-21 PROCEDURE — V2632 POST CHMBR INTRAOCULAR LENS: HCPCS | Performed by: OPHTHALMOLOGY

## 2022-11-21 PROCEDURE — 25010000002 PROPOFOL 200 MG/20ML EMULSION: Performed by: NURSE ANESTHETIST, CERTIFIED REGISTERED

## 2022-11-21 DEVICE — LENS MONOFOCAL IQ CC60WF235: Type: IMPLANTABLE DEVICE | Site: POSTERIOR CHAMBER | Status: FUNCTIONAL

## 2022-11-21 RX ORDER — BALANCED SALT SOLUTION 6.4; .75; .48; .3; 3.9; 1.7 MG/ML; MG/ML; MG/ML; MG/ML; MG/ML; MG/ML
SOLUTION OPHTHALMIC AS NEEDED
Status: DISCONTINUED | OUTPATIENT
Start: 2022-11-21 | End: 2022-11-21 | Stop reason: HOSPADM

## 2022-11-21 RX ORDER — TETRACAINE HYDROCHLORIDE 5 MG/ML
SOLUTION OPHTHALMIC AS NEEDED
Status: DISCONTINUED | OUTPATIENT
Start: 2022-11-21 | End: 2022-11-21 | Stop reason: HOSPADM

## 2022-11-21 RX ORDER — ACETAZOLAMIDE 500 MG/1
500 CAPSULE, EXTENDED RELEASE ORAL ONCE
Status: COMPLETED | OUTPATIENT
Start: 2022-11-21 | End: 2022-11-21

## 2022-11-21 RX ORDER — CYCLOPENT/TROPIC/PHEN/KETR/WAT 1%-1%-2.5%
1 DROPS (EA) OPHTHALMIC (EYE)
Status: COMPLETED | OUTPATIENT
Start: 2022-11-21 | End: 2022-11-21

## 2022-11-21 RX ORDER — SODIUM CHLORIDE 0.9 % (FLUSH) 0.9 %
10 SYRINGE (ML) INJECTION EVERY 12 HOURS SCHEDULED
Status: DISCONTINUED | OUTPATIENT
Start: 2022-11-21 | End: 2022-11-21 | Stop reason: HOSPADM

## 2022-11-21 RX ORDER — SODIUM CHLORIDE 0.9 % (FLUSH) 0.9 %
1-10 SYRINGE (ML) INJECTION AS NEEDED
Status: DISCONTINUED | OUTPATIENT
Start: 2022-11-21 | End: 2022-11-21 | Stop reason: HOSPADM

## 2022-11-21 RX ORDER — LIDOCAINE HYDROCHLORIDE 20 MG/ML
INJECTION, SOLUTION EPIDURAL; INFILTRATION; INTRACAUDAL; PERINEURAL AS NEEDED
Status: DISCONTINUED | OUTPATIENT
Start: 2022-11-21 | End: 2022-11-21 | Stop reason: HOSPADM

## 2022-11-21 RX ORDER — PROPOFOL 10 MG/ML
INJECTION, EMULSION INTRAVENOUS AS NEEDED
Status: DISCONTINUED | OUTPATIENT
Start: 2022-11-21 | End: 2022-11-21 | Stop reason: SURG

## 2022-11-21 RX ORDER — KETAMINE HCL IN NACL, ISO-OSM 100MG/10ML
SYRINGE (ML) INJECTION AS NEEDED
Status: DISCONTINUED | OUTPATIENT
Start: 2022-11-21 | End: 2022-11-21 | Stop reason: SURG

## 2022-11-21 RX ORDER — TETRACAINE HYDROCHLORIDE 5 MG/ML
1 SOLUTION OPHTHALMIC SEE ADMIN INSTRUCTIONS
Status: COMPLETED | OUTPATIENT
Start: 2022-11-21 | End: 2022-11-21

## 2022-11-21 RX ORDER — SODIUM CHLORIDE, SODIUM LACTATE, POTASSIUM CHLORIDE, CALCIUM CHLORIDE 600; 310; 30; 20 MG/100ML; MG/100ML; MG/100ML; MG/100ML
1000 INJECTION, SOLUTION INTRAVENOUS CONTINUOUS
Status: DISCONTINUED | OUTPATIENT
Start: 2022-11-21 | End: 2022-11-21 | Stop reason: HOSPADM

## 2022-11-21 RX ORDER — PREDNISOLONE ACETATE 10 MG/ML
SUSPENSION/ DROPS OPHTHALMIC AS NEEDED
Status: DISCONTINUED | OUTPATIENT
Start: 2022-11-21 | End: 2022-11-21 | Stop reason: HOSPADM

## 2022-11-21 RX ORDER — NEOMYCIN SULFATE, POLYMYXIN B SULFATE, AND DEXAMETHASONE 3.5; 10000; 1 MG/G; [USP'U]/G; MG/G
OINTMENT OPHTHALMIC AS NEEDED
Status: DISCONTINUED | OUTPATIENT
Start: 2022-11-21 | End: 2022-11-21 | Stop reason: HOSPADM

## 2022-11-21 RX ORDER — PREDNISOLONE ACETATE 10 MG/ML
1 SUSPENSION/ DROPS OPHTHALMIC SEE ADMIN INSTRUCTIONS
Status: DISCONTINUED | OUTPATIENT
Start: 2022-11-21 | End: 2022-11-21 | Stop reason: HOSPADM

## 2022-11-21 RX ADMIN — Medication 1 DROP: at 08:47

## 2022-11-21 RX ADMIN — SODIUM CHLORIDE, POTASSIUM CHLORIDE, SODIUM LACTATE AND CALCIUM CHLORIDE 1000 ML: 600; 310; 30; 20 INJECTION, SOLUTION INTRAVENOUS at 08:50

## 2022-11-21 RX ADMIN — Medication 1 DROP: at 08:57

## 2022-11-21 RX ADMIN — TETRACAINE HYDROCHLORIDE 1 DROP: 5 SOLUTION OPHTHALMIC at 08:46

## 2022-11-21 RX ADMIN — PROPOFOL 50 MG: 10 INJECTION, EMULSION INTRAVENOUS at 09:45

## 2022-11-21 RX ADMIN — Medication 1 DROP: at 08:52

## 2022-11-21 RX ADMIN — Medication 10 MG: at 09:45

## 2022-11-21 RX ADMIN — ACETAZOLAMIDE 500 MG: 500 CAPSULE, EXTENDED RELEASE ORAL at 10:10

## 2022-11-21 RX ADMIN — TETRACAINE HYDROCHLORIDE 1 DROP: 5 SOLUTION OPHTHALMIC at 08:45

## 2022-11-21 NOTE — H&P
"[facility] [place]         History and Physical    Patient Name: Cipriano Montes  MRN: [id number]  : 1940  Gender: male     HPI: Patient complaint of PPLOV Left eye diagnosed with cataract. Patient requests PHACO PCIOL for Increase of VA/ADL.    History:    Past Medical History:   Diagnosis Date   • Allergies    • Anemia    • Arthritis    • Asthma    • Cataracts, bilateral    • Depression    • Enlarged prostate    • History of Clostridioides difficile infection    • Hypertension    • Hypothyroidism    • Kidney stones    • Peripheral neuropathy    • Thyroid disease    • Thyroid disorder        Past Surgical History:   Procedure Laterality Date   • ANKLE SURGERY Right     ankle/foot fracture surgery   • CATARACT EXTRACTION W/ INTRAOCULAR LENS IMPLANT Right 2022    Procedure: CATARACT PHACO EXTRACTION WITH INTRAOCULAR LENS IMPLANT RIGHT;  Surgeon: Spencer Mccollum MD;  Location: [facility];  Service: Ophthalmology;  Laterality: Right;   • COLON SURGERY      colon resection   • COLONOSCOPY     • CYSTOSCOPY TRANSURETHRAL RESECTION OF PROSTATE N/A 2022    Procedure: CYSTOSCOPY TRANSURETHRAL RESECTION OF PROSTATE (BIPOLAR);  Surgeon: Torsten Hawley MD;  Location: [facility];  Service: Urology;  Laterality: N/A;   • PROSTATE SURGERY      \"uralift\" surgery   • TONSILLECTOMY         Social History     Socioeconomic History   • Marital status:    Tobacco Use   • Smoking status: Never   • Smokeless tobacco: Never   Vaping Use   • Vaping Use: Never used   Substance and Sexual Activity   • Alcohol use: Not Currently   • Drug use: No   • Sexual activity: Defer       Family History   Problem Relation Age of Onset   • Cancer Other    • Diabetes Other    • Prostate cancer Other    • Leukemia Mother    • Diabetes Father        Prior to Admission Medications:  Medications Prior to Admission   Medication Sig Dispense Refill Last Dose   • acetaminophen (TYLENOL) 650 MG 8 hr tablet " Every 8 (Eight) Hours As Needed.   Past Week   • B Complex-C (B-complex with vitamin C) tablet Take 1 tablet by mouth Daily. 90 tablet 3 11/20/2022   • Fluticasone Furoate-Vilanterol (Breo Ellipta) 100-25 MCG/INH inhaler Inhale 1 puff Daily. 60 each 3 11/21/2022   • levothyroxine (SYNTHROID, LEVOTHROID) 100 MCG tablet TAKE 1 TABLET BY MOUTH DAILY 90 tablet 3 11/21/2022   • Mirabegron ER (MYRBETRIQ) 50 MG tablet sustained-release 24 hour 24 hr tablet Take 50 mg by mouth Daily. 30 tablet 0 11/21/2022   • NIFEdipine XL (PROCARDIA XL) 60 MG 24 hr tablet TAKE 1 TABLET BY MOUTH DAILY 90 tablet 3 11/21/2022   • Probiotic Product (PROBIOTIC-10 PO) Probiotic   1 capsule daily   11/20/2022   • vitamin B-12 (CYANOCOBALAMIN) 1000 MCG tablet Take 100 mcg by mouth Daily.   11/20/2022   • bacitracin-polymyxin b (POLYSPORIN) 500-18132 UNIT/GM ointment Apply to catheter and penis tip twice daily 30 g 0 Unknown       Allergies:  Allergies   Allergen Reactions   • Oxycodone-Acetaminophen Nausea Only and GI Intolerance        Vitals: Temp:  [98.1 °F (36.7 °C)] 98.1 °F (36.7 °C)  Heart Rate:  [69] 69  Resp:  [18] 18  BP: (148)/(82) 148/82    Review of Systems:   Within Normal Limits Abnormal   HEENT [x]    []     Cardiovascular [x]   []     Gastrointestinal [x]   []     Genitourinary [x]   []     Neurologic [x]   []     Pulmonary [x]   []       Physical Exam:   Within Normal Limits Abnormal   HEENT [x]    []     Heart [x]   []     Lungs [x]   []     Abdomen [x]   []     Extremities [x]   []       Impression: Left nuclear sclerotic cataract.     Plan: CATARACT PHACO EXTRACTION WITH INTRAOCULAR LENS IMPLANT LEFT (Left)     Spencer Mccollum MD  11/21/2022

## 2022-11-21 NOTE — ANESTHESIA POSTPROCEDURE EVALUATION
Patient: Cipriano Montes    Procedure Summary     Date: 11/21/22 Room / Location: [facility] OR  /  [facility]    Anesthesia Start: 0944 Anesthesia Stop: 0957    Procedure: CATARACT PHACO EXTRACTION WITH INTRAOCULAR LENS IMPLANT LEFT (Left: Eye) Diagnosis:       Nuclear sclerotic cataract of left eye      (Nuclear sclerotic cataract of left eye [H25.12])    Surgeons: Spencer Mccollum MD Provider: Kris Laureano CRNA    Anesthesia Type: MAC ASA Status: 3          Anesthesia Type: MAC    Vitals  No vitals data found for the desired time range.          Post Anesthesia Care and Evaluation    Patient location during evaluation: bedside  Patient participation: complete - patient participated  Level of consciousness: awake  Pain score: 0  Pain management: adequate    Airway patency: patent  Anesthetic complications: No anesthetic complications  PONV Status: controlled  Cardiovascular status: acceptable and stable  Respiratory status: acceptable and room air  Hydration status: acceptable    Comments: Vital signs as noted in nursing documentation as per protocol

## 2022-11-21 NOTE — DISCHARGE INSTRUCTIONS
Post Operative Cataract Instructions     Left Eye  POST OPERATIVE INSTRUCTIONS  You have received anesthesia today. DO NOT drive, drink alcohol, sign legal documents.   After surgery, your eye will not hurt. It may feel scratchy, sticky or uncomfortable. Your eye will be sensitive to light.  Most people see better 1-3 days after the procedure, but it could take 3 weeks to get the full benefits and reach your visual potential. If your vision is blurry for a few days it is normal, and means you may have swelling outside or inside the eye. For some patients, a bubble is placed and there will be blurriness.   You should receive a post-op kit with tape and an eye shield. Wear the shield for the first 3 nights after surgery to keep you from rubbing the eye.  You may wear glasses or sunglasses during the day.  You must keep eye protected at all times.  Most people are able to return to their normal routine 1-3 days after surgery, however, due to the brain adjusting to your new vision you may have trouble judging distances and want to be careful when driving and going up and downstairs.   You can shower and wash your hair the day after surgery. Keep water, shampoo, hair spray and shaving lotion out of the eye, especially for the first week.   If eyes become stuck together after surgery you may soak with warm cloth.  During the first week, you should AVOID:   Rubbing or putting pressure on your eye.  Eye make-up, face cream or lotion, hair coloring or perms  Strenuous activities, such as running or lifting weights, as to avoid sweat from getting in the eye. Avoid swimming, hot tubs, fumes or lm conditions.   Sleeping on operative side.  Excessive sneezing or coughing.  Hanging the head down for periods of time. Keep your head above your heart.    Some discomfort and blurred vision after surgery are normal, but if you have any unusual pain, swelling, bleeding or sudden decrease in vision, contact our office immediately.      Emergency assistance is available at any time by calling:    Dr.Mark Veda Mccollum  [phone]    If unable to reach call [facility] @ [phone]    You have been prescribed an eye drop to use after surgery, please follow these instructions and bring eye drops and instructions with you to post op appointment:    Difluprednate (DUREZOL) 0.05 %. Shake well before use.    USE 1 DROP 4 (FOUR) TIMES A DAY FOR 1 WEEK THEN STARTING WEEK 2, ONLY USE 2 (TWO) TIMES A DAY UNTIL YOU RUN OUT OF DROPS.     PLACE A HAWA IN THE DAY COLUMN EACH TIME YOU USE TO KEEP ON SCHEDULE    WEEK 1-USE 4  (FOUR) TIMES A DAY DAY 1  []   []    []   []     DAY 2  []   []    []   []  DAY 3  []   []    []   []  DAY 4  []   []    []   []  DAY 5  []   []    []   []  DAY 6  []   []    []   []  DAY 7  []   []    []   []      WEEK 2-USE 2 (TWO)  TIMES A DAY DAY 1  []   []  DAY 2  []   []  DAY 3  []   []  DAY 4  []   []  DAY 5  []   []  DAY 6  []   []  DAY 7  []   []      WEEK 3-USE 2 (TWO) TIMES A DAY DAY 1  []   []  DAY 2  []   []  DAY 3  []   []  DAY 4  []   []  DAY 5  []   []  DAY 6  []   []  DAY 7  []   []      WEEK 4-USE 2 (TWO)TIMES A DAY DAY 1  []   []  DAY 2  []   []  DAY 3  []   []  DAY 4  []   []  DAY 5  []   []  DAY 6  []   []  DAY 7  []   []       Please follow all post op instructions and follow up appointment time from your physician's office included in your discharge packet.  .  No pushing, pulling, tugging,  heavy lifting, or strenuous activity.  No major decision making, driving, or drinking alcoholic beverages for 24 hours. ( due to the medications you have  received)  Always use good hand hygiene/washing techniques.  NO driving while taking pain medications.    * if you have an incision:  Check your incision area every day for signs of infection.   Check for:  * more redness, swelling, or pain  *more fluid or blood  *warmth  *pus or bad smell     To  assist you in voiding:  Drink plenty of fluids  Listen to running water while attempting to void.    If you are unable to urinate and you have an uncomfortable urge to void or it has been   6 hours since you were discharged, return to the Emergency Room

## 2022-11-21 NOTE — OP NOTE
OPERATIVE NOTE    Date of Procedure: 11/21/2022  Patient Name: Cipriano Montes  Patient MRN: [id number]  YOB: 1940     Preoperative Diagnosis: Left nuclear sclerotic cataract.     Postoperative Diagnosis: Left nuclear sclerotic cataract.     Procedure Performed: Phacoemulsification with implantation of a  foldable posterior chamber intraocular lens, Left eye.     Surgeon: Spencer Mccollum MD     Anesthesia:  Monitored Anesthesia Care (MAC)      Brief History and Indication: The patient presents with a history of past progressive loss of vision.  Patient was diagnosed with cataract and requests removal for increased ability to read and see.     Operation Description: The patient was taken to the OR and prepped and draped in the usual sterile ophthalmic fashion. A lid speculum was placed in the eye.  A #75 blade was then used to make a stab incision two o’clock hours from the intended temporal clear cornea groove. The anterior chamber was then inflated with a Viscoelastic. A metal microkeratome blade was then used to enter the anterior chamber at the temporal clear cornea site. A three level tunnel incision was made. A curvilinear capsulorrhexis was then performed with a bent cystotome needle and capsulorrhexis forceps.  BSS on a 30 gauge bent cannula was used to hydro-dissect, and hydro-delineate the lens. Good fluid waves and hydro-delineation were noted. Phacoemulsification was then used to remove nuclear material without complications. The residual cortical and lenticular material was then removed with irrigation and aspiration. Viscoelastics were then used to inflate the bag in a soft shell technique. A PCIOL was injected into the bag. Post-implantation, there were no rents or tears in the bag and the lens was noted to be stable and centered. The residual Viscoelastic was then removed with irrigation and aspiration.  The wound was checked and found to be without leaks. Therefore a Polydex ointment  and one drop of Durezol eye drop was placed in the eye.     Implant Information:   Implant Name Type Inv. Item Serial No.  Lot No. LRB No. Used Action   LENS MONOFOCAL IQ [id number] - [id number] - [id number] Implant LENS MONOFOCAL IQ [id number] TITO  Left 1 Implanted       Complications: None    Estimated Blood Loss:  Less than 1 cc.       []   Changed to complex procedure due to: []  hypermature, white cataract. Blue dye was used. []   small iris. A Malyugin Ring was used.    Discharge and Condition  The patient was transported to same day surgery in excellent condition and scheduled for follow-up tomorrow morning. The patient was given instructions on use of eye drops for the operative eye and was specifically instructed to call Dr. Mccollum at his office or home for any nausea, vomiting, headache, decreased visual acuity, or pain, or if the patient had any general concerns.    Spencer Mccollum MD  11/21/2022

## 2022-11-21 NOTE — ANESTHESIA PREPROCEDURE EVALUATION
Anesthesia Evaluation     Patient summary reviewed and Nursing notes reviewed   no history of anesthetic complications:  NPO Solid Status: > 8 hours  NPO Liquid Status: > 8 hours           Airway   Mallampati: I  TM distance: >3 FB  Neck ROM: full  no difficulty expected  Dental - normal exam     Pulmonary - normal exam   (+) asthma,shortness of breath,   Cardiovascular - normal exam    (+) hypertension, hyperlipidemia,       Neuro/Psych  (+) numbness, psychiatric history,    GI/Hepatic/Renal/Endo    (+) obesity, morbid obesity,  renal disease, thyroid problem hypothyroidism    Musculoskeletal     Abdominal    Substance History - negative use     OB/GYN negative ob/gyn ROS         Other   arthritis,                      Anesthesia Plan    ASA 3     MAC     intravenous induction     Anesthetic plan, risks, benefits, and alternatives have been provided, discussed and informed consent has been obtained with: patient.        CODE STATUS:

## 2022-12-05 ENCOUNTER — OFFICE VISIT (OUTPATIENT)
Dept: INTERNAL MEDICINE | Facility: CLINIC | Age: 82
End: 2022-12-05

## 2022-12-05 VITALS
SYSTOLIC BLOOD PRESSURE: 130 MMHG | DIASTOLIC BLOOD PRESSURE: 78 MMHG | HEART RATE: 76 BPM | BODY MASS INDEX: 41.5 KG/M2 | RESPIRATION RATE: 16 BRPM | TEMPERATURE: 96.8 F | OXYGEN SATURATION: 97 % | WEIGHT: 219.8 LBS | HEIGHT: 61 IN

## 2022-12-05 DIAGNOSIS — I49.9 CARDIAC ARRHYTHMIA, UNSPECIFIED CARDIAC ARRHYTHMIA TYPE: ICD-10-CM

## 2022-12-05 DIAGNOSIS — Z12.5 PROSTATE CANCER SCREENING: ICD-10-CM

## 2022-12-05 DIAGNOSIS — E11.43 TYPE 2 DIABETES MELLITUS WITH DIABETIC AUTONOMIC NEUROPATHY, WITHOUT LONG-TERM CURRENT USE OF INSULIN: ICD-10-CM

## 2022-12-05 DIAGNOSIS — E03.9 ACQUIRED HYPOTHYROIDISM: ICD-10-CM

## 2022-12-05 DIAGNOSIS — G47.34 NOCTURNAL HYPOXIA: ICD-10-CM

## 2022-12-05 DIAGNOSIS — E11.9 TYPE 2 DIABETES MELLITUS WITHOUT COMPLICATION, WITHOUT LONG-TERM CURRENT USE OF INSULIN: Primary | ICD-10-CM

## 2022-12-05 DIAGNOSIS — S82.891S CLOSED FRACTURE OF RIGHT ANKLE, SEQUELA: ICD-10-CM

## 2022-12-05 PROCEDURE — 99214 OFFICE O/P EST MOD 30 MIN: CPT | Performed by: INTERNAL MEDICINE

## 2022-12-05 RX ORDER — MOXIFLOXACIN 5 MG/ML
SOLUTION/ DROPS OPHTHALMIC
COMMUNITY
Start: 2022-11-07 | End: 2023-02-20

## 2022-12-05 NOTE — PROGRESS NOTES
"Subjective     Patient ID: Cipriano Montes is a 82 y.o. male. Patient is here for management of multiple medical problems.     Chief Complaint   Patient presents with   • Follow-up     Six week F/u     History of Present Illness   Cataract surgy both eeyes.     Urolith and then turp.      FL in Jan.       The following portions of the patient's history were reviewed and updated as appropriate: allergies, current medications, past family history, past medical history, past social history, past surgical history and problem list.    Review of Systems    Current Outpatient Medications:   •  B Complex-C (B-complex with vitamin C) tablet, Take 1 tablet by mouth Daily., Disp: 90 tablet, Rfl: 3  •  Fluticasone Furoate-Vilanterol (Breo Ellipta) 100-25 MCG/INH inhaler, Inhale 1 puff Daily., Disp: 60 each, Rfl: 3  •  levothyroxine (SYNTHROID, LEVOTHROID) 100 MCG tablet, TAKE 1 TABLET BY MOUTH DAILY, Disp: 90 tablet, Rfl: 3  •  NIFEdipine XL (PROCARDIA XL) 60 MG 24 hr tablet, TAKE 1 TABLET BY MOUTH DAILY, Disp: 90 tablet, Rfl: 3  •  Probiotic Product (PROBIOTIC-10 PO), Probiotic  1 capsule daily, Disp: , Rfl:   •  vitamin B-12 (CYANOCOBALAMIN) 1000 MCG tablet, Take 100 mcg by mouth Daily., Disp: , Rfl:   •  acetaminophen (TYLENOL) 650 MG 8 hr tablet, Every 8 (Eight) Hours As Needed., Disp: , Rfl:   •  bacitracin-polymyxin b (POLYSPORIN) 500-74856 UNIT/GM ointment, Apply to catheter and penis tip twice daily, Disp: 30 g, Rfl: 0  •  Mirabegron ER (MYRBETRIQ) 50 MG tablet sustained-release 24 hour 24 hr tablet, Take 50 mg by mouth Daily., Disp: 30 tablet, Rfl: 0  •  moxifloxacin (VIGAMOX) 0.5 % ophthalmic solution, INSTILL 1 DROP IN RIGHT EYE FOUR TIMES DAILY FOR USE AFTER SURGERY FOR 1 WEEK, Disp: , Rfl:     Objective      Blood pressure 130/78, pulse 76, temperature 96.8 °F (36 °C), temperature source Infrared, resp. rate 16, height 155.4 cm (61.2\"), weight 99.7 kg (219 lb 12.8 oz), SpO2 97 %.    Physical Exam     General " Appearance:    Alert, cooperative, no distress, appears stated age   Head:    Normocephalic, without obvious abnormality, atraumatic   Eyes:    PERRL, conjunctiva/corneas clear, EOM's intact   Ears:    Normal TM's and external ear canals, both ears   Nose:   Nares normal, septum midline, mucosa normal, no drainage   or sinus tenderness   Throat:   Lips, mucosa, and tongue normal; teeth and gums normal   Neck:   Supple, symmetrical, trachea midline, no adenopathy;        thyroid:  No enlargement/tenderness/nodules; no carotid    bruit or JVD   Back:     Symmetric, no curvature, ROM normal, no CVA tenderness   Lungs:     Clear to auscultation bilaterally, respirations unlabored   Chest wall:    No tenderness or deformity   Heart:    Regular rate and rhythm, S1 and S2 normal, no murmur,        rub or gallop   Abdomen:     Soft, non-tender, bowel sounds active all four quadrants,     no masses, no organomegaly   Extremities:   Extremities normal, atraumatic, no cyanosis or edema   Pulses:   2+ and symmetric all extremities   Skin:   Skin color, texture, turgor normal, no rashes or lesions   Lymph nodes:   Cervical, supraclavicular, and axillary nodes normal   Neurologic:   CNII-XII intact. Normal strength, sensation and reflexes       throughout      Results for orders placed or performed in visit on 11/10/22   Urine Culture - Urine, Urine, Clean Catch    Specimen: Urine, Clean Catch   Result Value Ref Range    Urine Culture No growth    POC Urinalysis Dipstick, Automated    Specimen: Urine   Result Value Ref Range    Color Yellow Yellow, Straw, Dark Yellow, Winnie    Clarity, UA Clear Clear    Specific Gravity  1.030 1.005 - 1.030    pH, Urine 6.0 5.0 - 8.0    Leukocytes Trace (A) Negative    Nitrite, UA Negative Negative    Protein, POC 1+ (A) Negative mg/dL    Glucose, UA Negative Negative mg/dL    Ketones, UA Negative Negative    Urobilinogen, UA Normal Normal, 0.2 E.U./dL    Bilirubin Negative Negative    Blood, UA  3+ (A) Negative    Lot Number [id number]     Expiration Date 02/10/24          Assessment & Plan     No arthritis no memory loss  Renal insuff likely related to symptomatic bph that is now corrected.       Procancer found.   Small amount  Older brother 65 .  of Prostate cancer was monitored close. Onset was fast.                  Diagnoses and all orders for this visit:    1. Type 2 diabetes mellitus without complication, without long-term current use of insulin (HCC) (Primary)  -     Lipid Panel  -     CBC & Differential  -     Vitamin B12  -     Comprehensive Metabolic Panel  -     TSH  -     T4, Free  -     Hemoglobin A1c  -     MicroAlbumin, Urine, Random - Urine, Clean Catch    2. Prostate cancer screening  -     Lipid Panel  -     CBC & Differential  -     Vitamin B12  -     Comprehensive Metabolic Panel  -     TSH  -     T4, Free  -     Hemoglobin A1c  -     MicroAlbumin, Urine, Random - Urine, Clean Catch  -     PSA Screen    3. Acquired hypothyroidism  -     Lipid Panel  -     CBC & Differential  -     Vitamin B12  -     Comprehensive Metabolic Panel  -     TSH  -     T4, Free  -     Hemoglobin A1c  -     MicroAlbumin, Urine, Random - Urine, Clean Catch    4. Cardiac arrhythmia, unspecified cardiac arrhythmia type    5. Nocturnal hypoxia  -     Ambulatory Referral to Cardiology    6. Closed fracture of right ankle, sequela  -     Ambulatory Referral to Orthopedic Surgery    7. Type 2 diabetes mellitus with diabetic autonomic neuropathy, without long-term current use of insulin (HCC)  -     Vitamin B6      Return in about 4 months (around 2023) for Medicare Wellness.          There are no Patient Instructions on file for this visit.     Ron Velazquez MD    Assessment & Plan

## 2022-12-05 NOTE — ADDENDUM NOTE
Addended by: FAN HARDY on: 12/5/2022 09:47 AM     Modules accepted: Orders     [Healing] : healing [Size%: ______] : Size: [unfilled]% [Infected?] : Infected: No [3] : 3 out of 10 [Abnormal] : abnormal [Medium] : medium [] : no [de-identified] : percocet [de-identified] : right foot\par \par right foot-->  open wounds prox and distal foot --> granulating--> small residual fluid--> local wound care

## 2023-02-20 ENCOUNTER — OFFICE VISIT (OUTPATIENT)
Dept: CARDIOLOGY | Facility: CLINIC | Age: 83
End: 2023-02-20
Payer: MEDICARE

## 2023-02-20 VITALS
OXYGEN SATURATION: 96 % | DIASTOLIC BLOOD PRESSURE: 84 MMHG | HEART RATE: 79 BPM | BODY MASS INDEX: 31.21 KG/M2 | WEIGHT: 218 LBS | SYSTOLIC BLOOD PRESSURE: 136 MMHG | HEIGHT: 70 IN

## 2023-02-20 DIAGNOSIS — R00.2 PALPITATIONS: Primary | ICD-10-CM

## 2023-02-20 DIAGNOSIS — I10 PRIMARY HYPERTENSION: ICD-10-CM

## 2023-02-20 DIAGNOSIS — R06.09 DOE (DYSPNEA ON EXERTION): ICD-10-CM

## 2023-02-20 PROCEDURE — 99203 OFFICE O/P NEW LOW 30 MIN: CPT | Performed by: INTERNAL MEDICINE

## 2023-02-20 RX ORDER — FLUTICASONE PROPIONATE 50 MCG
SPRAY, SUSPENSION (ML) NASAL AS NEEDED
COMMUNITY
Start: 2023-01-19

## 2023-02-20 NOTE — PROGRESS NOTES
[facility]  Consultation H&P  Cipriano Montes  1940  [address] [facility] [address]     VISIT DATE:  02/20/23    PCP: Ron Velazquez MD  [address]  [facility] [address]    IDENTIFICATION: A 82 y.o. male retired [employer]     PROBLEM LIST:  Palpitation  HL   10/22 180/147/38/116  HTN    BPH- urolift(refractory), subsequent TURP  [facility]    CC:  Chief Complaint   Patient presents with   • Hypoxia       Allergies  Allergies   Allergen Reactions   • Oxycodone-Acetaminophen Nausea Only and GI Intolerance       Current Medications    Current Outpatient Medications:   •  acetaminophen (TYLENOL) 650 MG 8 hr tablet, Every 8 (Eight) Hours As Needed., Disp: , Rfl:   •  B Complex-C (B-complex with vitamin C) tablet, Take 1 tablet by mouth Daily., Disp: 90 tablet, Rfl: 3  •  fluticasone (FLONASE) 50 MCG/ACT nasal spray, As Needed., Disp: , Rfl:   •  Fluticasone Furoate-Vilanterol (Breo Ellipta) 100-25 MCG/INH inhaler, Inhale 1 puff Daily., Disp: 60 each, Rfl: 3  •  levothyroxine (SYNTHROID, LEVOTHROID) 100 MCG tablet, TAKE 1 TABLET BY MOUTH DAILY, Disp: 90 tablet, Rfl: 3  •  NIFEdipine XL (PROCARDIA XL) 60 MG 24 hr tablet, TAKE 1 TABLET BY MOUTH DAILY, Disp: 90 tablet, Rfl: 3  •  Probiotic Product (PROBIOTIC-10 PO), Probiotic  1 capsule daily, Disp: , Rfl:   •  vitamin B-12 (CYANOCOBALAMIN) 1000 MCG tablet, Take 100 mcg by mouth Daily., Disp: , Rfl:      History of Present Illness   HPI  Cipriano Montes is a 82 y.o. year old male with the above mentioned PMH who presents for consult from Ron Velazquez MD for evaluation of palpitations and arrhythmia.  Patient states on exam he was noted with heart rate irregularity.  He exercises weekly at the [facility] and out-of-doors when not wintertime.  Outside of his arthritides he states he is noted no exercise intolerance.  He does note when he lies down at night that he will have  fluttering palpitations.  His right lower extremity remains enlarged after having undergone 4 ankle surgeries due to fracture    Pt denies any chest pain, dyspnea at rest,  orthopnea, PND,   or claudication. Pt denies history of CHF, DVT, PE, MI, CVA, TIA, or rheumatic fever.   He does note some exercise induced shortness of breath but he states it is largely secondary to his age he feels.  Has a trip planned cruise from [place] to the [place]  ROS  Review of Systems   Constitutional: Negative for chills, fever, malaise/fatigue, night sweats, weight gain and weight loss.   HENT: Negative for hearing loss and nosebleeds.    Eyes: Negative for blurred vision, vision loss in left eye, vision loss in right eye, visual disturbance and visual halos.   Cardiovascular: Positive for dyspnea on exertion and palpitations. Negative for chest pain, claudication, cyanosis, irregular heartbeat, leg swelling, near-syncope, orthopnea, paroxysmal nocturnal dyspnea and syncope.   Respiratory: Negative for cough, hemoptysis, shortness of breath, snoring and wheezing.    Endocrine: Negative for cold intolerance, heat intolerance, polydipsia, polyphagia and polyuria.   Hematologic/Lymphatic: Negative for adenopathy and bleeding problem. Does not bruise/bleed easily.   Skin: Negative for dry skin, poor wound healing and rash.   Musculoskeletal: Positive for joint pain. Negative for falls, joint swelling, muscle cramps, muscle weakness, myalgias and neck pain.   Gastrointestinal: Negative for bloating, abdominal pain, change in bowel habit, bowel incontinence, constipation, diarrhea, dysphagia, excessive appetite, heartburn, hematemesis, hematochezia, jaundice, melena, nausea and vomiting.   Genitourinary: Negative for bladder incontinence, dysuria, flank pain, hematuria, hesitancy and nocturia.   Neurological: Negative for aphonia, excessive daytime sleepiness, dizziness, focal weakness, headaches, light-headedness, loss of balance,  "seizures, sensory change, tremors, vertigo and weakness.   Psychiatric/Behavioral: Negative for altered mental status, depression, memory loss, substance abuse and suicidal ideas. The patient is not nervous/anxious.        SOCIAL HX  Social History     Socioeconomic History   • Marital status:    Tobacco Use   • Smoking status: Never   • Smokeless tobacco: Never   Vaping Use   • Vaping Use: Never used   Substance and Sexual Activity   • Alcohol use: Not Currently   • Drug use: No   • Sexual activity: Defer       FAMILY HX  Family History   Problem Relation Age of Onset   • Cancer Other    • Diabetes Other    • Prostate cancer Other    • Leukemia Mother    • Diabetes Father        Vitals:    02/20/23 1004   BP: 136/84   BP Location: Right arm   Patient Position: Sitting   Cuff Size: Adult   Pulse: 79   SpO2: 96%   Weight: 98.9 kg (218 lb)   Height: 177.8 cm (70\")     Body mass index is 31.28 kg/m².     PHYSICAL EXAMINATION:  Constitutional:       Appearance: Healthy appearance. Not in distress.   Neck:      Vascular: No JVR. JVD normal.   Pulmonary:      Effort: Pulmonary effort is normal.      Breath sounds: Normal breath sounds. No wheezing. No rhonchi. No rales.   Chest:      Chest wall: Not tender to palpatation.   Cardiovascular:      PMI at left midclavicular line. Normal rate. Occasional ectopic beats. Regular rhythm. Normal S1. Normal S2.      Murmurs: There is no murmur.      No gallop. No click. No rub.   Pulses:     Intact distal pulses.   Edema:     Peripheral edema present.     Ankle: 3+ edema of the left ankle.     Feet: 3+ edema of the left foot.  Abdominal:      General: Bowel sounds are normal.      Palpations: Abdomen is soft.      Tenderness: There is no abdominal tenderness.   Musculoskeletal: Normal range of motion.         General: No tenderness. Skin:     General: Skin is warm and dry.   Neurological:      General: No focal deficit present.      Mental Status: Alert and oriented to " person, place and time.         Diagnostic Data:  Procedures     Lab Results   Component Value Date    CHLPL 193 03/27/2015    TRIG 147 10/05/2022    HDL 38 (L) 10/05/2022     Lab Results   Component Value Date    GLUCOSE 122 (H) 10/05/2022    BUN 13 10/05/2022    CREATININE 1.19 10/05/2022     10/05/2022    K 4.0 10/05/2022     10/05/2022    CO2 23.7 10/05/2022     Lab Results   Component Value Date    HGBA1C 6.00 (H) 09/07/2021     Lab Results   Component Value Date    WBC 8.19 10/05/2022    HGB 16.2 10/05/2022    HCT 47.8 10/05/2022     10/05/2022       ASSESSMENT:   Diagnosis Plan   1. Palpitations        2. LUCAS (dyspnea on exertion)        3. Primary hypertension            PLAN:  Dyspnea no documentation of echocardiogram for filling pressure.  Any LV dysfunction would discuss ischemic evaluation    Palpitations document 1 week E patch    Hypertension controlled current nifedipine      Ron Velazquez MD, thank you for referring Mr. Montes for evaluation.  I have forwarded my electronically generated recommendations to you for review.  Please do not hesitate to call with any questions.      Iain Abbott MD, FACC

## 2023-03-01 ENCOUNTER — OFFICE VISIT (OUTPATIENT)
Dept: UROLOGY | Facility: CLINIC | Age: 83
End: 2023-03-01
Payer: MEDICARE

## 2023-03-01 VITALS — BODY MASS INDEX: 31.28 KG/M2 | HEIGHT: 70 IN

## 2023-03-01 DIAGNOSIS — C61 PROSTATE CANCER: ICD-10-CM

## 2023-03-01 DIAGNOSIS — N39.41 URGENCY INCONTINENCE: ICD-10-CM

## 2023-03-01 DIAGNOSIS — N40.1 BENIGN PROSTATIC HYPERPLASIA WITH URINARY OBSTRUCTION: ICD-10-CM

## 2023-03-01 DIAGNOSIS — N13.8 BENIGN PROSTATIC HYPERPLASIA WITH URINARY OBSTRUCTION: ICD-10-CM

## 2023-03-01 DIAGNOSIS — R82.81 PYURIA: Primary | ICD-10-CM

## 2023-03-01 LAB
BILIRUB BLD-MCNC: ABNORMAL MG/DL
CLARITY, POC: CLEAR
COLOR UR: YELLOW
EXPIRATION DATE: ABNORMAL
GLUCOSE UR STRIP-MCNC: NEGATIVE MG/DL
KETONES UR QL: NEGATIVE
LEUKOCYTE EST, POC: NEGATIVE
Lab: ABNORMAL
NITRITE UR-MCNC: NEGATIVE MG/ML
PH UR: 6 [PH] (ref 5–8)
PROT UR STRIP-MCNC: NEGATIVE MG/DL
RBC # UR STRIP: NEGATIVE /UL
SP GR UR: 1.03 (ref 1–1.03)
UROBILINOGEN UR QL: NORMAL

## 2023-03-01 PROCEDURE — 81003 URINALYSIS AUTO W/O SCOPE: CPT | Performed by: STUDENT IN AN ORGANIZED HEALTH CARE EDUCATION/TRAINING PROGRAM

## 2023-03-01 PROCEDURE — 51798 US URINE CAPACITY MEASURE: CPT | Performed by: STUDENT IN AN ORGANIZED HEALTH CARE EDUCATION/TRAINING PROGRAM

## 2023-03-01 PROCEDURE — 99213 OFFICE O/P EST LOW 20 MIN: CPT | Performed by: STUDENT IN AN ORGANIZED HEALTH CARE EDUCATION/TRAINING PROGRAM

## 2023-03-01 NOTE — PROGRESS NOTES
Follow Up Office Visit      Patient Name: Cipriano Montes  : 1940   MRN: [id number]     Chief Complaint:    Chief Complaint   Patient presents with   • Benign Prostatic Hypertrophy     Obstruction     • Urgency Incontinence   • Pyuria       Referring Provider: No ref. provider found    History of Present Illness: Cipriano Montes is a 82 y.o. male who presents today for follow up of BPH with lower urinary tract symptoms, BPH with obstruction status post formal transurethral resection of prostate 2022.  On TURP pathology was found to have very low risk Oz 3+3 equal 6 prostate cancer which we are monitoring with serial PSA.    Patient was last evaluated in November, at that time he was still complaining of some moderate to severe urinary urgency despite oxybutynin and Levsin, he was prescribed a 30-day sample of Myrbetriq 50 mg daily which he is not sure improved his symptoms.  He does report a very strong stream and he is pleased with his improvement.  He has had somewhat of an improvement in his urgency but still has rare episodes of urgency related incontinence if he is working out in his yard or if he holds his bladder too long.  He does not wear diapers or pads.  His PSA trend below is stable.    Lab Results   Component Value Date    PSA 1.370 2022    PSA 1.550 2021    PSA 0.710 10/05/2020    PSA 0.80 10/22/2015    PSA 1.30 2015    PSA 0.50 2014     IPSS today is 15, previous IPSS in November was 11, primary complaint is urgency, frequency and sensation that he is not emptying his bladder.    His PVR is 55 mL, emptying fairly well.    He is noted to have interesting anatomy intraoperatively, with a high bladder neck and a left lateral bladder wall diverticulum adjacent to the ureteral orifice.      Subjective      Review of System: Review of Systems   Genitourinary: Negative for decreased urine volume, difficulty urinating, dysuria, enuresis, flank pain, frequency,  hematuria and urgency.      I have reviewed the ROS documented by my clinical staff, I have updated appropriately and I agree. Torsten Hawley MD    I have reviewed and the following portions of the patient's history were updated as appropriate: past family history, past medical history, past social history, past surgical history and problem list.    Medications:     Current Outpatient Medications:   •  acetaminophen (TYLENOL) 650 MG 8 hr tablet, Every 8 (Eight) Hours As Needed., Disp: , Rfl:   •  B Complex-C (B-complex with vitamin C) tablet, Take 1 tablet by mouth Daily., Disp: 90 tablet, Rfl: 3  •  fluticasone (FLONASE) 50 MCG/ACT nasal spray, As Needed., Disp: , Rfl:   •  Fluticasone Furoate-Vilanterol (Breo Ellipta) 100-25 MCG/INH inhaler, Inhale 1 puff Daily., Disp: 60 each, Rfl: 3  •  levothyroxine (SYNTHROID, LEVOTHROID) 100 MCG tablet, TAKE 1 TABLET BY MOUTH DAILY, Disp: 90 tablet, Rfl: 3  •  NIFEdipine XL (PROCARDIA XL) 60 MG 24 hr tablet, TAKE 1 TABLET BY MOUTH DAILY, Disp: 90 tablet, Rfl: 3  •  Probiotic Product (PROBIOTIC-10 PO), Probiotic  1 capsule daily, Disp: , Rfl:   •  vitamin B-12 (CYANOCOBALAMIN) 1000 MCG tablet, Take 100 mcg by mouth Daily., Disp: , Rfl:     Allergies:   Allergies   Allergen Reactions   • Oxycodone-Acetaminophen Nausea Only and GI Intolerance       IPSS Questionnaire (AUA-7):  Over the past month…    1)  Incomplete Emptying:       How often have you had a sensation of not emptying you had the sensation of not emptying your bladder completely after you finished urinating?  4 - More than half the time   2)  Frequency:       How often have you had the urinate again less than two hours after you finished urinating?  4 - More than half the time   3)  Intermittency:       How often have you found you stopped and started again several times when you urinated?   1 - Less than 1 time in 5   4) Urgency:      How often have you found it difficult to postpone urination?  4 - More than  "half the time   5) Weak Stream:      How often have you had a weak urinary stream?  1 - Less than 1 time in 5   6) Straining:       How often have you had to push or strain to begin urination?  0 - Not at all   7) Nocturia:      How many times did you most typically get up to urinate from the time you went to bed at night until the time you got up in the morning?  1 - 1 time   Total Score:  15   The International Prostate Symptom Score (IPSS) is used to screen, diagnose, track symptoms of benign prostatic hyperplasia (BPH).   0-7 (Mild Symptoms) 8-19 (Moderate) 20-35 (Severe)   Quality of Life (QoL):  If you were to spend the rest of your life with your urinary condition just the way it is now, how would you feel about that? 3-Mixed   Urine Leakage (Incontinence) 0-No Leakage     Sexual Health Inventory for Men (WILBERT)   Over the past 6 months:     1. How do you rate your confidence that you could get and keep an erection?  0 - No sexual activity    2. When you had erections with sexual  stimulation, how often were your erections hard enough for penetration (entering your partner)?  0 - No Sexual Activity    3. During sexual intercourse, how often were you able to maintain your erection after you had penetrated (entered) your partner?  0 - Did not attempt intercourse   4. During sexual intercourse, how difficult was it to maintain your erection to completion of intercourse?  0 - Did not attempt intercourse   5. When you attempted sexual intercourse, how often was it satisfactory for you?  0 - Did not attempt intercourse    Total Score: 0   The Sexual Health Inventory for Men further classifies ED severity with the following breakpoints:   1-7 (Severe ED) 8-11 (Moderate ED) 12-16 (Mild to Moderate ED) 17-21 (Mild ED)      Post void residual bladder scan:   55 mL     Objective     Physical Exam:   Vital Signs:   Vitals:    03/01/23 1456   Height: 177.8 cm (70\")     Body mass index is 31.28 kg/m².     Physical " Exam  Constitutional:       Appearance: Normal appearance.   HENT:      Head: Normocephalic and atraumatic.      Nose: Nose normal.   Eyes:      Extraocular Movements: Extraocular movements intact.      Conjunctiva/sclera: Conjunctivae normal.      Pupils: Pupils are equal, round, and reactive to light.   Musculoskeletal:         General: Normal range of motion.      Cervical back: Normal range of motion and neck supple.   Skin:     General: Skin is warm and dry.      Findings: No lesion or rash.   Neurological:      General: No focal deficit present.      Mental Status: He is alert and oriented to person, place, and time. Mental status is at baseline.   Psychiatric:         Mood and Affect: Mood normal.         Behavior: Behavior normal.         Labs:   Brief Urine Lab Results  (Last result in the past 365 days)      Color   Clarity   Blood   Leuk Est   Nitrite   Protein   CREAT   Urine HCG        03/01/23 1508 Yellow   Clear   Negative   Negative   Negative   Negative                 Urine Culture    Urine Culture 9/1/22 9/16/22 11/10/22   Urine Culture No growth >100,000 CFU/mL Staphylococcus epidermidis (A) (C) No growth   (A) Abnormal value (C) Corrected value               Lab Results   Component Value Date    GLUCOSE 122 (H) 10/05/2022    CALCIUM 9.5 10/05/2022     10/05/2022    K 4.0 10/05/2022    CO2 23.7 10/05/2022     10/05/2022    BUN 13 10/05/2022    CREATININE 1.19 10/05/2022    EGFRIFAFRI 74 11/09/2020    EGFRIFNONA 62 09/07/2021    BCR 10.9 10/05/2022    ANIONGAP 11.3 10/05/2022       Lab Results   Component Value Date    WBC 8.19 10/05/2022    HGB 16.2 10/05/2022    HCT 47.8 10/05/2022    MCV 87.2 10/05/2022     10/05/2022       Images:   No Images in the past 120 days found..    Measures:   Tobacco:   Cipriano Montes  reports that he has never smoked. He has never used smokeless tobacco.    Assessment / Plan      Assessment/Plan:   82 y.o. male who presented today for follow up of  BPH and urinary obstruction status post TURP September 2022.  He is now 6 months out from surgery and still continues to complain of some mild to moderate urinary urgency, frequency and rare sensation of incomplete bladder emptying.  His PVR is reassuring.  His urinalysis is reassuring.  I offered the patient continued empiric medication treatment either switching to vibegron or the consideration of intravesical Botox for his primary storage related symptoms.  He denies stress incontinence which is great.  He says he has performed Kegel exercises.  At this juncture the patient would like to continue monitoring with a 1 year follow-up, he states if his symptoms progress over time he may call me and I did offer him a flexible cystoscopy, uroflow and cath urine volume to rule out anything abnormal, essentially this would be a complete repeat assessment of his urinary symptoms if he needs this depending on the severity of his symptoms.  Patient reports understanding    Prostate cancer standpoint, he does not need a PSA until I see him back in a year.  This is essentially watchful waiting for his very low risk disease which he is very unlikely to die from.  His PSA trend is stable.  We discussed that low risk, low-grade prostate cancer does not spread (contrasting this to intermediate or high risk prostate cancer).    Diagnoses and all orders for this visit:      1. Benign prostatic hyperplasia with urinary obstruction  -     POC Urinalysis Dipstick, Automated    2. Urgency incontinence  -     POC Urinalysis Dipstick, Automated    3. Prostate cancer (HCC)  -     PSA Diagnostic; Future           Follow Up:   Return in about 1 year (around 3/1/2024).    I spent approximately 20 minutes providing clinical care for this patient; including review of patient's chart and provider documentation, face to face time spent with patient in examination room (obtaining history, performing physical exam, discussing diagnosis and  management options), placing orders, and completing patient documentation.     Torsten Hawley MD  [facility] [place]

## 2023-03-06 ENCOUNTER — HOSPITAL ENCOUNTER (OUTPATIENT)
Dept: CARDIOLOGY | Facility: HOSPITAL | Age: 83
Discharge: HOME | End: 2023-03-06
Admitting: INTERNAL MEDICINE
Payer: MEDICARE

## 2023-03-06 ENCOUNTER — TELEPHONE (OUTPATIENT)
Dept: CARDIOLOGY | Facility: CLINIC | Age: 83
End: 2023-03-06

## 2023-03-06 VITALS
BODY MASS INDEX: 31.21 KG/M2 | HEIGHT: 70 IN | DIASTOLIC BLOOD PRESSURE: 89 MMHG | SYSTOLIC BLOOD PRESSURE: 158 MMHG | WEIGHT: 218.03 LBS

## 2023-03-06 DIAGNOSIS — I47.1 PAROXYSMAL SVT (SUPRAVENTRICULAR TACHYCARDIA): ICD-10-CM

## 2023-03-06 DIAGNOSIS — I10 ESSENTIAL HYPERTENSION: Primary | ICD-10-CM

## 2023-03-06 DIAGNOSIS — E78.41 ELEVATED LIPOPROTEIN(A): ICD-10-CM

## 2023-03-06 DIAGNOSIS — R06.09 DOE (DYSPNEA ON EXERTION): ICD-10-CM

## 2023-03-06 DIAGNOSIS — R09.02 HYPOXIA: ICD-10-CM

## 2023-03-06 DIAGNOSIS — I47.20 VT (VENTRICULAR TACHYCARDIA): ICD-10-CM

## 2023-03-06 DIAGNOSIS — R00.2 PALPITATIONS: ICD-10-CM

## 2023-03-06 LAB
BH CV ECHO MEAS - AI P1/2T: 520 MSEC
BH CV ECHO MEAS - AO MAX PG: 8.8 MMHG
BH CV ECHO MEAS - AO MEAN PG: 5 MMHG
BH CV ECHO MEAS - AO ROOT DIAM: 3.4 CM
BH CV ECHO MEAS - AO V2 MAX: 148 CM/SEC
BH CV ECHO MEAS - AO V2 VTI: 26.5 CM
BH CV ECHO MEAS - AVA(I,D): 2.38 CM2
BH CV ECHO MEAS - EDV(CUBED): 50.7 ML
BH CV ECHO MEAS - EDV(MOD-SP2): 85 ML
BH CV ECHO MEAS - EDV(MOD-SP4): 68 ML
BH CV ECHO MEAS - EF(MOD-BP): 64 %
BH CV ECHO MEAS - EF(MOD-SP2): 68.2 %
BH CV ECHO MEAS - EF(MOD-SP4): 63.2 %
BH CV ECHO MEAS - ESV(CUBED): 14.3 ML
BH CV ECHO MEAS - ESV(MOD-SP2): 27 ML
BH CV ECHO MEAS - ESV(MOD-SP4): 25 ML
BH CV ECHO MEAS - FS: 34.3 %
BH CV ECHO MEAS - IVS/LVPW: 1.02 CM
BH CV ECHO MEAS - IVSD: 1.42 CM
BH CV ECHO MEAS - LA DIMENSION: 3.5 CM
BH CV ECHO MEAS - LV DIASTOLIC VOL/BSA (35-75): 31.4 CM2
BH CV ECHO MEAS - LV MASS(C)D: 188 GRAMS
BH CV ECHO MEAS - LV MAX PG: 4.5 MMHG
BH CV ECHO MEAS - LV MEAN PG: 2 MMHG
BH CV ECHO MEAS - LV SYSTOLIC VOL/BSA (12-30): 11.5 CM2
BH CV ECHO MEAS - LV V1 MAX: 106 CM/SEC
BH CV ECHO MEAS - LV V1 VTI: 20.1 CM
BH CV ECHO MEAS - LVIDD: 3.7 CM
BH CV ECHO MEAS - LVIDS: 2.43 CM
BH CV ECHO MEAS - LVOT AREA: 3.1 CM2
BH CV ECHO MEAS - LVOT DIAM: 2 CM
BH CV ECHO MEAS - LVPWD: 1.39 CM
BH CV ECHO MEAS - MV A MAX VEL: 80.9 CM/SEC
BH CV ECHO MEAS - MV DEC SLOPE: 326 CM/SEC2
BH CV ECHO MEAS - MV DEC TIME: 0.15 MSEC
BH CV ECHO MEAS - MV E MAX VEL: 59.7 CM/SEC
BH CV ECHO MEAS - MV E/A: 0.74
BH CV ECHO MEAS - MV MAX PG: 3.1 MMHG
BH CV ECHO MEAS - MV MEAN PG: 2 MMHG
BH CV ECHO MEAS - MV P1/2T: 57.7 MSEC
BH CV ECHO MEAS - MV V2 VTI: 18.4 CM
BH CV ECHO MEAS - MVA(P1/2T): 3.8 CM2
BH CV ECHO MEAS - MVA(VTI): 3.4 CM2
BH CV ECHO MEAS - PA ACC SLOPE: 577 CM/SEC2
BH CV ECHO MEAS - PA ACC TIME: 0.12 SEC
BH CV ECHO MEAS - PA PR(ACCEL): 23.2 MMHG
BH CV ECHO MEAS - PA V2 MAX: 97.1 CM/SEC
BH CV ECHO MEAS - RAP SYSTOLE: 3 MMHG
BH CV ECHO MEAS - RVSP: 16 MMHG
BH CV ECHO MEAS - SI(MOD-SP2): 26.8 ML/M2
BH CV ECHO MEAS - SI(MOD-SP4): 19.9 ML/M2
BH CV ECHO MEAS - SV(LVOT): 63.1 ML
BH CV ECHO MEAS - SV(MOD-SP2): 58 ML
BH CV ECHO MEAS - SV(MOD-SP4): 43 ML
BH CV ECHO MEAS - TAPSE (>1.6): 1.5 CM
BH CV ECHO MEAS - TR MAX PG: 13 MMHG
BH CV ECHO MEAS - TR MAX VEL: 159.5 CM/SEC
BH CV XLRA - RV BASE: 3 CM
BH CV XLRA - RV LENGTH: 8 CM
BH CV XLRA - RV MID: 2.5 CM
MAXIMAL PREDICTED HEART RATE: 140 BPM
STRESS TARGET HR: 119 BPM

## 2023-03-06 PROCEDURE — 93306 TTE W/DOPPLER COMPLETE: CPT

## 2023-03-06 PROCEDURE — 93306 TTE W/DOPPLER COMPLETE: CPT | Performed by: INTERNAL MEDICINE

## 2023-03-06 NOTE — TELEPHONE ENCOUNTER
Per PAULA -   Echo   AV sclerosis mild AI   Nl lvef     Monitor SVT and VT noted - needs lexiscan per [facility]  Pt aware and agreeable.

## 2023-03-20 ENCOUNTER — HOSPITAL ENCOUNTER (OUTPATIENT)
Dept: CARDIOLOGY | Facility: HOSPITAL | Age: 83
Discharge: HOME | End: 2023-03-20
Admitting: INTERNAL MEDICINE
Payer: MEDICARE

## 2023-03-20 VITALS
HEART RATE: 75 BPM | SYSTOLIC BLOOD PRESSURE: 145 MMHG | BODY MASS INDEX: 31.21 KG/M2 | WEIGHT: 218 LBS | DIASTOLIC BLOOD PRESSURE: 95 MMHG | HEIGHT: 70 IN

## 2023-03-20 DIAGNOSIS — R06.09 DOE (DYSPNEA ON EXERTION): ICD-10-CM

## 2023-03-20 DIAGNOSIS — I47.20 VT (VENTRICULAR TACHYCARDIA): ICD-10-CM

## 2023-03-20 DIAGNOSIS — I10 ESSENTIAL HYPERTENSION: ICD-10-CM

## 2023-03-20 DIAGNOSIS — I47.1 PAROXYSMAL SVT (SUPRAVENTRICULAR TACHYCARDIA): ICD-10-CM

## 2023-03-20 DIAGNOSIS — E78.41 ELEVATED LIPOPROTEIN(A): ICD-10-CM

## 2023-03-20 DIAGNOSIS — R00.2 PALPITATIONS: ICD-10-CM

## 2023-03-20 LAB
BH CV REST NUCLEAR ISOTOPE DOSE: 9.1 MCI
BH CV STRESS BP STAGE 2: NORMAL
BH CV STRESS BP STAGE 4: NORMAL
BH CV STRESS COMMENTS STAGE 1: NORMAL
BH CV STRESS DOSE REGADENOSON STAGE 1: 0.4
BH CV STRESS DURATION MIN STAGE 1: 1
BH CV STRESS DURATION MIN STAGE 2: 1
BH CV STRESS DURATION MIN STAGE 3: 1
BH CV STRESS DURATION MIN STAGE 4: 1
BH CV STRESS HR STAGE 1: 80
BH CV STRESS HR STAGE 2: 90
BH CV STRESS HR STAGE 3: 88
BH CV STRESS HR STAGE 4: 83
BH CV STRESS NUCLEAR ISOTOPE DOSE: 33 MCI
BH CV STRESS O2 STAGE 2: 96
BH CV STRESS O2 STAGE 3: 95
BH CV STRESS O2 STAGE 4: 96
BH CV STRESS PROTOCOL 1: NORMAL
BH CV STRESS RECOVERY BP: NORMAL MMHG
BH CV STRESS RECOVERY HR: 85 BPM
BH CV STRESS RECOVERY O2: 96 %
BH CV STRESS STAGE 1: 1
BH CV STRESS STAGE 2: 2
BH CV STRESS STAGE 3: 3
BH CV STRESS STAGE 4: 4
LV EF NUC BP: 56 %
MAXIMAL PREDICTED HEART RATE: 139 BPM
PERCENT MAX PREDICTED HR: 69.06 %
STRESS BASELINE BP: NORMAL MMHG
STRESS BASELINE HR: 76 BPM
STRESS O2 SAT REST: 93 %
STRESS PERCENT HR: 81 %
STRESS POST EXERCISE DUR MIN: 4 MIN
STRESS POST EXERCISE DUR SEC: 0 SEC
STRESS POST O2 SAT PEAK: 95 %
STRESS POST PEAK BP: NORMAL MMHG
STRESS POST PEAK HR: 96 BPM
STRESS TARGET HR: 118 BPM

## 2023-03-20 PROCEDURE — 93017 CV STRESS TEST TRACING ONLY: CPT

## 2023-03-20 PROCEDURE — 25010000002 REGADENOSON 0.4 MG/5ML SOLUTION: Performed by: INTERNAL MEDICINE

## 2023-03-20 PROCEDURE — 0 TECHNETIUM SESTAMIBI: Performed by: INTERNAL MEDICINE

## 2023-03-20 PROCEDURE — A9500 TC99M SESTAMIBI: HCPCS | Performed by: INTERNAL MEDICINE

## 2023-03-20 PROCEDURE — 78452 HT MUSCLE IMAGE SPECT MULT: CPT | Performed by: INTERNAL MEDICINE

## 2023-03-20 PROCEDURE — 78452 HT MUSCLE IMAGE SPECT MULT: CPT

## 2023-03-20 PROCEDURE — 93018 CV STRESS TEST I&R ONLY: CPT | Performed by: INTERNAL MEDICINE

## 2023-03-20 RX ADMIN — TECHNETIUM TC 99M SESTAMIBI 1 DOSE: 1 INJECTION INTRAVENOUS at 08:00

## 2023-03-20 RX ADMIN — REGADENOSON 0.4 MG: 0.08 INJECTION, SOLUTION INTRAVENOUS at 09:57

## 2023-03-20 RX ADMIN — TECHNETIUM TC 99M SESTAMIBI 1 DOSE: 1 INJECTION INTRAVENOUS at 09:58

## 2023-03-21 ENCOUNTER — TELEPHONE (OUTPATIENT)
Dept: CARDIOLOGY | Facility: CLINIC | Age: 83
End: 2023-03-21
Payer: MEDICARE

## 2023-03-21 NOTE — TELEPHONE ENCOUNTER
Per [facility] - stress test low risk    Pt aware. He will call should further symptoms arise that we need to address prior to scheduled FU.

## 2023-04-11 ENCOUNTER — TELEPHONE (OUTPATIENT)
Dept: INTERNAL MEDICINE | Facility: CLINIC | Age: 83
End: 2023-04-11
Payer: MEDICARE

## 2023-04-11 DIAGNOSIS — J45.40 MODERATE PERSISTENT ASTHMA WITHOUT COMPLICATION: ICD-10-CM

## 2023-04-11 RX ORDER — ALBUTEROL SULFATE 90 UG/1
2 AEROSOL, METERED RESPIRATORY (INHALATION) EVERY 4 HOURS PRN
Qty: 18 G | Refills: 1 | Status: SHIPPED | OUTPATIENT
Start: 2023-04-11

## 2023-04-11 NOTE — TELEPHONE ENCOUNTER
Caller: Cipriano Montes    Relationship: Self    Best call back number: [phone]    What medication are you requesting: ALBUTEROL INHALER    Have you had these symptoms before:    [x] Yes  [] No    Have you been treated for these symptoms before:   [x] Yes  [] No    If a prescription is needed, what is your preferred pharmacy and phone number: [facility] #[id number] - [place], [place] - [address] AT [facility] [address] - [phone]  - [phone] FX     Additional notes: PATIENT STATES THAT HE HASN'T REFILLED INHALER IN A LONG TIME

## 2023-04-12 RX ORDER — FLUTICASONE FUROATE AND VILANTEROL TRIFENATATE 100; 25 UG/1; UG/1
POWDER RESPIRATORY (INHALATION)
Qty: 60 EACH | Refills: 3 | Status: SHIPPED | OUTPATIENT
Start: 2023-04-12 | End: 2023-04-17

## 2023-04-17 DIAGNOSIS — J45.40 MODERATE PERSISTENT ASTHMA WITHOUT COMPLICATION: ICD-10-CM

## 2023-04-17 RX ORDER — FLUTICASONE FUROATE AND VILANTEROL 100; 25 UG/1; UG/1
1 POWDER RESPIRATORY (INHALATION) DAILY
Qty: 90 EACH | Refills: 1 | Status: SHIPPED | OUTPATIENT
Start: 2023-04-17

## 2023-05-15 RX ORDER — NIFEDIPINE 60 MG/1
60 TABLET, EXTENDED RELEASE ORAL DAILY
Qty: 90 TABLET | Refills: 3 | Status: SHIPPED | OUTPATIENT
Start: 2023-05-15

## 2023-05-26 ENCOUNTER — TELEPHONE (OUTPATIENT)
Dept: INTERNAL MEDICINE | Facility: CLINIC | Age: 83
End: 2023-05-26
Payer: MEDICARE

## 2023-05-26 NOTE — TELEPHONE ENCOUNTER
Caller: Cipriano Montes    Relationship: Self    Best call back number:    [phone]          What orders are you requesting (i.e. lab or imaging): LAB WORK    In what timeframe would the patient need to come in:ASAP  Where will you receive your lab/imaging services: [facility]  Additional notes: CALL WHEN READY

## 2023-05-26 NOTE — TELEPHONE ENCOUNTER
Called patient with no answer, left a voicemail for patient informing that labs are active in his chart.

## 2023-06-01 LAB
ALBUMIN SERPL-MCNC: 4.2 G/DL (ref 3.5–5.2)
ALBUMIN/GLOB SERPL: 1.6 G/DL
ALP SERPL-CCNC: 119 U/L (ref 39–117)
ALT SERPL-CCNC: 31 U/L (ref 1–41)
AST SERPL-CCNC: 24 U/L (ref 1–40)
BASOPHILS # BLD AUTO: 0.06 10*3/MM3 (ref 0–0.2)
BASOPHILS NFR BLD AUTO: 0.8 % (ref 0–1.5)
BILIRUB SERPL-MCNC: 0.6 MG/DL (ref 0–1.2)
BUN SERPL-MCNC: 19 MG/DL (ref 8–23)
BUN/CREAT SERPL: 14.3 (ref 7–25)
CALCIUM SERPL-MCNC: 9.7 MG/DL (ref 8.6–10.5)
CHLORIDE SERPL-SCNC: 111 MMOL/L (ref 98–107)
CHOLEST SERPL-MCNC: 191 MG/DL (ref 0–200)
CO2 SERPL-SCNC: 21.3 MMOL/L (ref 22–29)
CREAT SERPL-MCNC: 1.33 MG/DL (ref 0.76–1.27)
EGFRCR SERPLBLD CKD-EPI 2021: 53.7 ML/MIN/1.73
EOSINOPHIL # BLD AUTO: 0.11 10*3/MM3 (ref 0–0.4)
EOSINOPHIL NFR BLD AUTO: 1.4 % (ref 0.3–6.2)
ERYTHROCYTE [DISTWIDTH] IN BLOOD BY AUTOMATED COUNT: 13.7 % (ref 12.3–15.4)
GLOBULIN SER CALC-MCNC: 2.7 GM/DL
GLUCOSE SERPL-MCNC: 91 MG/DL (ref 65–99)
HBA1C MFR BLD: 6.3 % (ref 4.8–5.6)
HCT VFR BLD AUTO: 48.9 % (ref 37.5–51)
HDLC SERPL-MCNC: 42 MG/DL (ref 40–60)
HGB BLD-MCNC: 16.2 G/DL (ref 13–17.7)
IMM GRANULOCYTES # BLD AUTO: 0.02 10*3/MM3 (ref 0–0.05)
IMM GRANULOCYTES NFR BLD AUTO: 0.3 % (ref 0–0.5)
LDLC SERPL CALC-MCNC: 134 MG/DL (ref 0–100)
LYMPHOCYTES # BLD AUTO: 1.38 10*3/MM3 (ref 0.7–3.1)
LYMPHOCYTES NFR BLD AUTO: 17.9 % (ref 19.6–45.3)
MCH RBC QN AUTO: 28.6 PG (ref 26.6–33)
MCHC RBC AUTO-ENTMCNC: 33.1 G/DL (ref 31.5–35.7)
MCV RBC AUTO: 86.4 FL (ref 79–97)
MICROALBUMIN UR-MCNC: 13.5 UG/ML
MONOCYTES # BLD AUTO: 0.68 10*3/MM3 (ref 0.1–0.9)
MONOCYTES NFR BLD AUTO: 8.8 % (ref 5–12)
NEUTROPHILS # BLD AUTO: 5.46 10*3/MM3 (ref 1.7–7)
NEUTROPHILS NFR BLD AUTO: 70.8 % (ref 42.7–76)
NRBC BLD AUTO-RTO: 0 /100 WBC (ref 0–0.2)
PLATELET # BLD AUTO: 236 10*3/MM3 (ref 140–450)
POTASSIUM SERPL-SCNC: 4.5 MMOL/L (ref 3.5–5.2)
PROT SERPL-MCNC: 6.9 G/DL (ref 6–8.5)
PSA SERPL-MCNC: 0.86 NG/ML (ref 0–4)
PYRIDOXAL PHOS SERPL-MCNC: 9.4 UG/L (ref 3.4–65.2)
RBC # BLD AUTO: 5.66 10*6/MM3 (ref 4.14–5.8)
SODIUM SERPL-SCNC: 145 MMOL/L (ref 136–145)
T4 FREE SERPL-MCNC: 1.14 NG/DL (ref 0.93–1.7)
TRIGL SERPL-MCNC: 83 MG/DL (ref 0–150)
TSH SERPL DL<=0.005 MIU/L-ACNC: 1.87 UIU/ML (ref 0.27–4.2)
VIT B12 SERPL-MCNC: 337 PG/ML (ref 211–946)
VLDLC SERPL CALC-MCNC: 15 MG/DL (ref 5–40)
WBC # BLD AUTO: 7.71 10*3/MM3 (ref 3.4–10.8)

## 2023-06-02 ENCOUNTER — OFFICE VISIT (OUTPATIENT)
Dept: INTERNAL MEDICINE | Facility: CLINIC | Age: 83
End: 2023-06-02

## 2023-06-02 VITALS
TEMPERATURE: 97.4 F | DIASTOLIC BLOOD PRESSURE: 80 MMHG | RESPIRATION RATE: 16 BRPM | BODY MASS INDEX: 30.78 KG/M2 | SYSTOLIC BLOOD PRESSURE: 138 MMHG | HEART RATE: 80 BPM | HEIGHT: 70 IN | OXYGEN SATURATION: 97 % | WEIGHT: 215 LBS

## 2023-06-02 DIAGNOSIS — E03.9 ACQUIRED HYPOTHYROIDISM: ICD-10-CM

## 2023-06-02 DIAGNOSIS — G62.9 POLYNEUROPATHY: Primary | ICD-10-CM

## 2023-06-02 DIAGNOSIS — E53.8 VITAMIN B12 DEFICIENCY: ICD-10-CM

## 2023-06-02 DIAGNOSIS — E55.9 VITAMIN D DEFICIENCY: ICD-10-CM

## 2023-06-02 DIAGNOSIS — E53.1 VITAMIN B6 DEFICIENCY: ICD-10-CM

## 2023-06-02 PROBLEM — R09.81 CONGESTION OF NASAL SINUS: Status: ACTIVE | Noted: 2023-01-11

## 2023-06-02 PROCEDURE — 99214 OFFICE O/P EST MOD 30 MIN: CPT | Performed by: INTERNAL MEDICINE

## 2023-06-02 PROCEDURE — 3079F DIAST BP 80-89 MM HG: CPT | Performed by: INTERNAL MEDICINE

## 2023-06-02 PROCEDURE — 3075F SYST BP GE 130 - 139MM HG: CPT | Performed by: INTERNAL MEDICINE

## 2023-06-02 NOTE — PROGRESS NOTES
Subjective     Patient ID: Cipriano Montes is a 83 y.o. male. Patient is here for management of multiple medical problems.     Chief Complaint   Patient presents with   • Diabetes   • Hypertension     History of Present Illness     Legs getting weaker all the time.  Loss of balance.  Walking is getting more difficult.  Pt playing golf once a week.   Hard to tell where feet are in space and time.  Seen by neurlogy and had NCV.   Hx of nerve damage.      1. Hereditary and idiopathic neuropathy, unspecified  (Primary)  -     CBC & Differential; Future  -     Celiac Disease Panel; Future  -     CK; Future  -     Comprehensive Metabolic Panel; Future  -     Cryoglobulin; Future  -     Hemoglobin A1c; Future  -     TSH; Future  -     Vitamin B12 & Folate; Future  -     Immunofixation, Serum; Future  -     DORA by IFA, Reflex 9-biomarkers profile; Future  -     Angiotensin Converting Enzyme; Future  -     Rheumatoid Factor; Future  -     Sedimentation Rate; Future     2. Abnormal lead level in blood   -     Hemoglobin A1c; Future     3. 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    Review of Systems    Current Outpatient Medications:   •  acetaminophen (TYLENOL) 650 MG 8 hr tablet, Every 8 (Eight) Hours As Needed., Disp: , Rfl:   •  albuterol sulfate  (90 Base) MCG/ACT inhaler, Inhale 2 puffs Every 4 (Four) Hours As Needed for Wheezing., Disp: 18 g, Rfl: 1  •  fluticasone (FLONASE) 50 MCG/ACT nasal spray, As Needed., Disp: , Rfl:   •  Fluticasone Furoate-Vilanterol (Breo Ellipta) 100-25 MCG/ACT aerosol powder , Inhale 1 puff Daily., Disp: 90 each, Rfl: 1  •  levothyroxine (SYNTHROID, LEVOTHROID) 100 MCG tablet, TAKE 1 TABLET  "BY MOUTH DAILY, Disp: 90 tablet, Rfl: 3  •  NIFEdipine XL (PROCARDIA XL) 60 MG 24 hr tablet, TAKE 1 TABLET BY MOUTH DAILY, Disp: 90 tablet, Rfl: 3  •  Probiotic Product (PROBIOTIC-10 PO), Probiotic  1 capsule daily, Disp: , Rfl:   •  vitamin B-12 (CYANOCOBALAMIN) 1000 MCG tablet, Take 100 mcg by mouth Daily., Disp: , Rfl:     Objective      Blood pressure 138/80, pulse 80, temperature 97.4 °F (36.3 °C), resp. rate 16, height 177.8 cm (70\"), weight 97.5 kg (215 lb), SpO2 97 %.    Physical Exam     General Appearance:    Alert, cooperative, no distress, appears stated age   Head:    Normocephalic, without obvious abnormality, atraumatic   Eyes:    PERRL, conjunctiva/corneas clear, EOM's intact   Ears:    Normal TM's and external ear canals, both ears   Nose:   Nares normal, septum midline, mucosa normal, no drainage   or sinus tenderness   Throat:   Lips, mucosa, and tongue normal; teeth and gums normal   Neck:   Supple, symmetrical, trachea midline, no adenopathy;        thyroid:  No enlargement/tenderness/nodules; no carotid    bruit or JVD   Back:     Symmetric, no curvature, ROM normal, no CVA tenderness   Lungs:     Clear to auscultation bilaterally, respirations unlabored   Chest wall:    No tenderness or deformity   Heart:    Regular rate and rhythm, S1 and S2 normal, no murmur,        rub or gallop   Abdomen:     Soft, non-tender, bowel sounds active all four quadrants,     no masses, no organomegaly   Extremities:   Extremities normal, atraumatic, no cyanosis or edema   Pulses:   2+ and symmetric all extremities   Skin:   Skin color, texture, turgor normal, no rashes or lesions   Lymph nodes:   Cervical, supraclavicular, and axillary nodes normal   Neurologic:   CNII-XII intact. Normal strength, sensation and reflexes       throughout      Results for orders placed or performed during the hospital encounter of 03/20/23   Stress Test With Myocardial Perfusion One Day   Result Value Ref Range    Target HR (85%) " 118 bpm    Max. Pred. HR (100%) 139 bpm    BH CV STRESS PROTOCOL 1 Pharmacologic     Stage 1 1     Duration Min Stage 1 1     Stress Dose Regadenoson Stage 1 0.4     Stress Comments Stage 1 10 sec bolus injection     Stage 2 2     Duration Min Stage 2 1     Stage 3 3     Duration Min Stage 3 1     Stage 4 4     Exercise duration (min) 4 min    Exercise duration (sec) 0 sec    BH CV REST NUCLEAR ISOTOPE DOSE 9.1 mCi    BH CV STRESS NUCLEAR ISOTOPE DOSE 33.0 mCi    Baseline HR 76 bpm    Baseline /95 mmHg    O2 sat rest 93 %    HR Stage 1 80     HR Stage 2 90     BP Stage 2 148/89     O2 Stage 2 96     HR Stage 3 88     O2 Stage 3 95     HR Stage 4 83     BP Stage 4 143/89     O2 Stage 4 96     Peak HR 96 bpm    Percent Max Pred HR 69.06 %    Percent Target HR 81 %    Peak /89 mmHg    O2 sat peak 95 %    Recovery HR 85 bpm    Recovery /86 mmHg    Recovery O2 96 %    Nuc Stress EF 56 %    Duration Min Stage 4 1          Assessment & Plan   Been out of the country.  1962 [place] and [facility]. 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   Pt was in [place] after [place].   [place].  [facility] and [place].     [place] 2008  [place] 2008. [place] trip. 1 week in each area.    Neuropathy [employer] [place] 2018. osteomylitis and on ABX.          Pt with neuropathy that started after hardware placement. Please see if you can test to metals he may have reaction causing problems.  Dr Chau please.    Will get second opinion from Neurology/.        Diagnoses and all orders for this visit:    1. Polyneuropathy (Primary)  -     Zinc  -     Heavy Metals Screen, Urine - Urine, Clean Catch  -     Protein Elec + Interp, Serum  -     C-reactive Protein  -     MALORIE+Protein Electro, 24-Hr Urine - Urine, Clean Catch  -     Kappa / Lambda Light Chains, Urine, 24 Hour - Urine, Clean Catch  -     Immunoglobulin Free LT Chains Blood  -     [place] Spotted Fever, IgM  -     Vitamin  B6  -     Vitamin D 25 hydroxy  -     IgG  -     IgM  -     IgA  -     Helicobacter Pylori, IgA IgG IgM  -     IgA, IgG Endomysial Antibodies  -     Glia(IgA / G) & TTG(IgA / G)  -     CK  -     Myoglobin, Serum  -     [place] Protein, Urine, Random - Urine, Clean Catch; Future  -     Cadmium, Blood; Future  -     Ambulatory Referral to Allergy  -     Cancel: Ambulatory Referral to Neurology  -     Ambulatory Referral to Neurology  -     TSH  -     T4, Free  -     Vitamin B12    2. Vitamin B6 deficiency  -     Zinc  -     Heavy Metals Screen, Urine - Urine, Clean Catch  -     Protein Elec + Interp, Serum  -     C-reactive Protein  -     MALORIE+Protein Electro, 24-Hr Urine - Urine, Clean Catch  -     Kappa / Lambda Light Chains, Urine, 24 Hour - Urine, Clean Catch  -     Immunoglobulin Free LT Chains Blood  -     [place] Spotted Fever, IgM  -     Vitamin B6  -     Vitamin D 25 hydroxy  -     IgG  -     IgM  -     IgA  -     Helicobacter Pylori, IgA IgG IgM  -     IgA, IgG Endomysial Antibodies  -     Glia(IgA / G) & TTG(IgA / G)  -     CK  -     Myoglobin, Serum  -     [place] Protein, Urine, Random - Urine, Clean Catch; Future  -     Cadmium, Blood; Future  -     Ambulatory Referral to Allergy  -     TSH  -     T4, Free  -     Vitamin B12    3. Vitamin D deficiency  -     Zinc  -     Heavy Metals Screen, Urine - Urine, Clean Catch  -     Protein Elec + Interp, Serum  -     C-reactive Protein  -     MALORIE+Protein Electro, 24-Hr Urine - Urine, Clean Catch  -     Kappa / Lambda Light Chains, Urine, 24 Hour - Urine, Clean Catch  -     Immunoglobulin Free LT Chains Blood  -     [place] Spotted Fever, IgM  -     Vitamin B6  -     Vitamin D 25 hydroxy  -     IgG  -     IgM  -     IgA  -     Helicobacter Pylori, IgA IgG IgM  -     IgA, IgG Endomysial Antibodies  -     Glia(IgA / G) & TTG(IgA / G)  -     CK  -     Myoglobin, Serum  -     [place] Protein, Urine, Random - Urine, Clean Catch; Future  -      Cadmium, Blood; Future  -     Ambulatory Referral to Allergy  -     TSH  -     T4, Free  -     Vitamin B12    4. Vitamin B12 deficiency  -     Zinc  -     Heavy Metals Screen, Urine - Urine, Clean Catch  -     Protein Elec + Interp, Serum  -     C-reactive Protein  -     MALORIE+Protein Electro, 24-Hr Urine - Urine, Clean Catch  -     Kappa / Lambda Light Chains, Urine, 24 Hour - Urine, Clean Catch  -     Immunoglobulin Free LT Chains Blood  -     [place] Spotted Fever, IgM  -     Vitamin B6  -     Vitamin D 25 hydroxy  -     IgG  -     IgM  -     IgA  -     Helicobacter Pylori, IgA IgG IgM  -     IgA, IgG Endomysial Antibodies  -     Glia(IgA / G) & TTG(IgA / G)  -     CK  -     Myoglobin, Serum  -     [place] Protein, Urine, Random - Urine, Clean Catch; Future  -     Cadmium, Blood; Future  -     Ambulatory Referral to Allergy  -     TSH  -     T4, Free  -     Vitamin B12    5. Acquired hypothyroidism  -     Zinc  -     Heavy Metals Screen, Urine - Urine, Clean Catch  -     Protein Elec + Interp, Serum  -     C-reactive Protein  -     MALORIE+Protein Electro, 24-Hr Urine - Urine, Clean Catch  -     Kappa / Lambda Light Chains, Urine, 24 Hour - Urine, Clean Catch  -     Immunoglobulin Free LT Chains Blood  -     [place] Spotted Fever, IgM  -     Vitamin B6  -     Vitamin D 25 hydroxy  -     IgG  -     IgM  -     IgA  -     Helicobacter Pylori, IgA IgG IgM  -     IgA, IgG Endomysial Antibodies  -     Glia(IgA / G) & TTG(IgA / G)  -     CK  -     Myoglobin, Serum  -     [place] Protein, Urine, Random - Urine, Clean Catch; Future  -     Cadmium, Blood; Future  -     Ambulatory Referral to Allergy  -     TSH  -     T4, Free  -     Vitamin B12      Return in about 3 weeks (around 6/23/2023).          There are no Patient Instructions on file for this visit.     Ron Velazquez MD    Assessment & Plan

## 2023-08-14 ENCOUNTER — OFFICE VISIT (OUTPATIENT)
Dept: PULMONOLOGY | Facility: CLINIC | Age: 83
End: 2023-08-14
Payer: MEDICARE

## 2023-08-14 VITALS
HEART RATE: 76 BPM | OXYGEN SATURATION: 96 % | DIASTOLIC BLOOD PRESSURE: 80 MMHG | HEIGHT: 70 IN | SYSTOLIC BLOOD PRESSURE: 152 MMHG | WEIGHT: 217 LBS | BODY MASS INDEX: 31.07 KG/M2

## 2023-08-14 DIAGNOSIS — J30.9 ALLERGIC RHINITIS, UNSPECIFIED SEASONALITY, UNSPECIFIED TRIGGER: ICD-10-CM

## 2023-08-14 DIAGNOSIS — J45.40 MODERATE PERSISTENT ASTHMA WITHOUT COMPLICATION: Primary | ICD-10-CM

## 2023-08-14 NOTE — PROGRESS NOTES
Follow Up Office Visit      Patient Name: Cipriano Montes    Chief Complaint:    Chief Complaint   Patient presents with    Shortness of Breath    Follow-up     Asthma follow-up        History of Present Illness: Cipriano Montes is a 83 y.o. male who is here today for follow up of asthma.  Since last visit, he is continued on Breo and denies any exacerbations.  Currently uses his short acting beta agonist approximately once every 2 weeks.  Tolerates ADLs well. He has some post nasal drainage that started 2 weeks ago. He is using Flonase. Also has Zyrtec at home to use if needed.    Subjective      Review of Systems:  Review of Systems   Constitutional:  Negative for fever and unexpected weight change.   HENT:  Positive for postnasal drip.    Respiratory:  Positive for cough. Negative for shortness of breath and wheezing.    Cardiovascular:  Negative for chest pain and leg swelling.   Allergic/Immunologic: Positive for environmental allergies.      Past Medical History:   Past Medical History:   Diagnosis Date    Allergies     Anemia     Arthritis     Asthma     Cataracts, bilateral     Depression     Enlarged prostate     History of Clostridioides difficile infection     Hypertension     Hypothyroidism     Kidney stones     Peripheral neuropathy     Thyroid disease     Thyroid disorder        Past Surgical History:   Past Surgical History:   Procedure Laterality Date    ANKLE SURGERY Right     ankle/foot fracture surgery    CATARACT EXTRACTION W/ INTRAOCULAR LENS IMPLANT Right 11/7/2022    Procedure: CATARACT PHACO EXTRACTION WITH INTRAOCULAR LENS IMPLANT RIGHT;  Surgeon: Spencer Mccollum MD;  Location: [facility];  Service: Ophthalmology;  Laterality: Right;    CATARACT EXTRACTION W/ INTRAOCULAR LENS IMPLANT Left 11/21/2022    Procedure: CATARACT PHACO EXTRACTION WITH INTRAOCULAR LENS IMPLANT LEFT;  Surgeon: Spencer Mccollum MD;  Location: [facility];  Service: Ophthalmology;  Laterality: Left;    COLON  "SURGERY      colon resection    COLONOSCOPY      CYSTOSCOPY TRANSURETHRAL RESECTION OF PROSTATE N/A 9/30/2022    Procedure: CYSTOSCOPY TRANSURETHRAL RESECTION OF PROSTATE (BIPOLAR);  Surgeon: Torsten Hawley MD;  Location: [facility];  Service: Urology;  Laterality: N/A;    PROSTATE SURGERY  2022    \"uralift\" surgery    TONSILLECTOMY         Family History:   Family History   Problem Relation Age of Onset    Cancer Other     Diabetes Other     Prostate cancer Other     Leukemia Mother     Diabetes Father        Social History:   Social History     Socioeconomic History    Marital status:    Tobacco Use    Smoking status: Never     Passive exposure: Never    Smokeless tobacco: Never   Vaping Use    Vaping Use: Never used   Substance and Sexual Activity    Alcohol use: Not Currently    Drug use: No    Sexual activity: Defer       Current Medications:     Current Outpatient Medications:     acetaminophen (TYLENOL) 650 MG 8 hr tablet, Every 8 (Eight) Hours As Needed., Disp: , Rfl:     albuterol sulfate  (90 Base) MCG/ACT inhaler, Inhale 2 puffs Every 4 (Four) Hours As Needed for Wheezing., Disp: 18 g, Rfl: 1    fluticasone (FLONASE) 50 MCG/ACT nasal spray, As Needed., Disp: , Rfl:     Fluticasone Furoate-Vilanterol (Breo Ellipta) 100-25 MCG/ACT aerosol powder , Inhale 1 puff Daily., Disp: 90 each, Rfl: 1    levothyroxine (SYNTHROID, LEVOTHROID) 100 MCG tablet, TAKE 1 TABLET BY MOUTH DAILY, Disp: 90 tablet, Rfl: 3    NIFEdipine XL (PROCARDIA XL) 60 MG 24 hr tablet, TAKE 1 TABLET BY MOUTH DAILY, Disp: 90 tablet, Rfl: 3    Probiotic Product (PROBIOTIC-10 PO), Probiotic  1 capsule daily, Disp: , Rfl:     vitamin B-12 (CYANOCOBALAMIN) 1000 MCG tablet, Take 100 mcg by mouth Daily., Disp: , Rfl:      Allergies:   Allergies   Allergen Reactions    Oxycodone-Acetaminophen Nausea Only and GI Intolerance       Objective     Physical Exam:  Vital Signs:   Vitals:    08/14/23 1458   BP: 152/80   Pulse: 76   SpO2: " "96%   Weight: 98.4 kg (217 lb)   Height: 177.8 cm (70\")     Body mass index is 31.14 kg/mý.    Physical Exam  Vitals reviewed.   Constitutional:       General: He is not in acute distress.     Appearance: He is not toxic-appearing.   HENT:      Head: Normocephalic and atraumatic.      Mouth/Throat:      Mouth: Mucous membranes are moist.   Eyes:      Extraocular Movements: Extraocular movements intact.      Conjunctiva/sclera: Conjunctivae normal.      Pupils: Pupils are equal, round, and reactive to light.   Cardiovascular:      Rate and Rhythm: Normal rate.      Heart sounds: Normal heart sounds.   Pulmonary:      Effort: Pulmonary effort is normal.      Breath sounds: Normal breath sounds.   Abdominal:      General: There is no distension.      Palpations: Abdomen is soft.   Musculoskeletal:         General: No swelling.      Cervical back: Neck supple.   Skin:     General: Skin is warm and dry.      Findings: No rash.   Neurological:      General: No focal deficit present.      Mental Status: He is alert and oriented to person, place, and time.   Psychiatric:         Mood and Affect: Mood normal.         Behavior: Behavior normal.     Assessment / Plan      Assessment/Plan:   Diagnoses and all orders for this visit:    1. Moderate persistent asthma without complication (Primary)  Good control on current regimen. We discussed the risk and benefits of inhaled corticosteroids. Patient instructed to take them on a regular basis as prescribed. Patient instructed to rinse their mouth out after each use.     2. Allergic rhinitis, unspecified seasonality, unspecified trigger  Continue use of Flonase and can also add over-the-counter Zyrtec as needed.       Follow Up:   Return in about 1 year (around 8/14/2024) for Recheck.  The patient was counseled on diagnostic results, risks and benefits of treatment options, risk factor modifications and the importance of treatment compliance. The patient was advised to contact the " clinic with concerns or worsening symptoms.     Daija Freitas, APRN   [facility]

## 2023-10-18 DIAGNOSIS — J45.40 MODERATE PERSISTENT ASTHMA WITHOUT COMPLICATION: ICD-10-CM

## 2023-10-18 RX ORDER — FLUTICASONE FUROATE AND VILANTEROL TRIFENATATE 100; 25 UG/1; UG/1
1 POWDER RESPIRATORY (INHALATION) DAILY
Qty: 180 EACH | Refills: 2 | Status: SHIPPED | OUTPATIENT
Start: 2023-10-18

## 2024-03-13 DIAGNOSIS — J45.40 MODERATE PERSISTENT ASTHMA WITHOUT COMPLICATION: ICD-10-CM

## 2024-03-13 RX ORDER — FLUTICASONE FUROATE AND VILANTEROL 100; 25 UG/1; UG/1
1 POWDER RESPIRATORY (INHALATION) DAILY
Qty: 180 EACH | Refills: 2 | Status: SHIPPED | OUTPATIENT
Start: 2024-03-13

## 2024-05-06 ENCOUNTER — OFFICE VISIT (OUTPATIENT)
Dept: CARDIOLOGY | Facility: CLINIC | Age: 84
End: 2024-05-06
Payer: MEDICARE

## 2024-05-06 VITALS
BODY MASS INDEX: 30.21 KG/M2 | HEIGHT: 70 IN | OXYGEN SATURATION: 96 % | HEART RATE: 83 BPM | WEIGHT: 211 LBS | SYSTOLIC BLOOD PRESSURE: 130 MMHG | DIASTOLIC BLOOD PRESSURE: 76 MMHG

## 2024-05-06 DIAGNOSIS — I10 PRIMARY HYPERTENSION: ICD-10-CM

## 2024-05-06 DIAGNOSIS — R06.09 DOE (DYSPNEA ON EXERTION): Primary | ICD-10-CM

## 2024-05-06 DIAGNOSIS — R00.2 PALPITATIONS: ICD-10-CM

## 2024-05-06 PROCEDURE — 99214 OFFICE O/P EST MOD 30 MIN: CPT | Performed by: INTERNAL MEDICINE

## 2024-05-06 PROCEDURE — 3075F SYST BP GE 130 - 139MM HG: CPT | Performed by: INTERNAL MEDICINE

## 2024-05-06 PROCEDURE — 3078F DIAST BP <80 MM HG: CPT | Performed by: INTERNAL MEDICINE

## 2024-05-10 ENCOUNTER — TELEPHONE (OUTPATIENT)
Dept: INTERNAL MEDICINE | Facility: CLINIC | Age: 84
End: 2024-05-10

## 2024-05-10 RX ORDER — NIFEDIPINE 60 MG/1
60 TABLET, EXTENDED RELEASE ORAL DAILY
Qty: 90 TABLET | Refills: 3 | Status: SHIPPED | OUTPATIENT
Start: 2024-05-10

## 2024-05-10 NOTE — TELEPHONE ENCOUNTER
Caller: Cipriano Montes    Relationship: Self    Best call back number:     What was the call regarding: THE PATIENT CALLED TO SCHEDULE AN APPT WITH DR HARDY.  HE HAS NOT BEEN SEEN SINCE JUNE OF 2023 FOR AN OV.  I SCHEDULED HIM FOR AUGUST OF 24 FOR A MEDICARE WELLNESS VISIT.  THE PATIENT WOULD LIKE TO BE SEEN SOONER IF POSSIBLE.  I PUT HIM ON THE WAIT LIST.   PLEASE CHECK WITH DR HARDY TO SEE IF HE CAN SEE THIS PATIENT ANY SOONER AND CALL HIM BACK.

## 2024-06-26 DIAGNOSIS — E03.9 ACQUIRED HYPOTHYROIDISM: ICD-10-CM

## 2024-06-26 RX ORDER — LEVOTHYROXINE SODIUM 0.1 MG/1
100 TABLET ORAL DAILY
Qty: 90 TABLET | Refills: 3 | Status: SHIPPED | OUTPATIENT
Start: 2024-06-26

## 2024-06-26 NOTE — TELEPHONE ENCOUNTER
Rx Refill Note  Requested Prescriptions     Pending Prescriptions Disp Refills    levothyroxine (SYNTHROID, LEVOTHROID) 100 MCG tablet [Pharmacy Med Name: LEVOTHYROXINE 0.100MG (100MCG) TAB] 90 tablet 3     Sig: TAKE 1 TABLET BY MOUTH DAILY      Last office visit with prescribing clinician: 6/2/2023   Last telemedicine visit with prescribing clinician: Visit date not found   Next office visit with prescribing clinician: 8/30/2024                         Would you like a call back once the refill request has been completed: [] Yes [] No    If the office needs to give you a call back, can they leave a voicemail: [] Yes [] No    Sera Kothari MA  06/26/24, 12:20 EDT

## 2024-08-12 ENCOUNTER — OFFICE VISIT (OUTPATIENT)
Dept: PULMONOLOGY | Facility: CLINIC | Age: 84
End: 2024-08-12
Payer: MEDICARE

## 2024-08-12 VITALS
DIASTOLIC BLOOD PRESSURE: 82 MMHG | HEART RATE: 89 BPM | SYSTOLIC BLOOD PRESSURE: 142 MMHG | OXYGEN SATURATION: 95 % | HEIGHT: 70 IN | WEIGHT: 211 LBS | RESPIRATION RATE: 18 BRPM | BODY MASS INDEX: 30.21 KG/M2

## 2024-08-12 DIAGNOSIS — J45.40 MODERATE PERSISTENT ASTHMA WITHOUT COMPLICATION: ICD-10-CM

## 2024-08-12 PROCEDURE — 3077F SYST BP >= 140 MM HG: CPT | Performed by: NURSE PRACTITIONER

## 2024-08-12 PROCEDURE — 3079F DIAST BP 80-89 MM HG: CPT | Performed by: NURSE PRACTITIONER

## 2024-08-12 PROCEDURE — 99213 OFFICE O/P EST LOW 20 MIN: CPT | Performed by: NURSE PRACTITIONER

## 2024-08-12 RX ORDER — FLUTICASONE FUROATE AND VILANTEROL 100; 25 UG/1; UG/1
1 POWDER RESPIRATORY (INHALATION) DAILY
Qty: 180 EACH | Refills: 3 | Status: SHIPPED | OUTPATIENT
Start: 2024-08-12

## 2024-08-12 NOTE — PROGRESS NOTES
Follow Up Office Visit      Patient Name: Cipriano Montes    Chief Complaint:    Chief Complaint   Patient presents with    Breathing Problem    Follow-up       History of Present Illness: Cipriano Montes is a 84 y.o. male who is here today for follow up of asthma. Since last visit, symptoms have remained well controlled on Breo. No exacerbations. BOBBY use is 2-3x/year. No fevers, no hemoptysis, no unintended weight loss. Tolerates ADLs well.    Supplemental Oxygen: No    Subjective      Review of Systems:  Review of Systems   Constitutional:  Negative for fever and unexpected weight change.   Respiratory:  Positive for shortness of breath (Occasional). Negative for cough and wheezing.    Cardiovascular:  Negative for chest pain.        Past Medical History:   Past Medical History:   Diagnosis Date    Allergies     Anemia     Arthritis     Asthma     Cataracts, bilateral     Depression     Enlarged prostate     History of Clostridioides difficile infection     Hypertension     Hypothyroidism     Kidney stones     Peripheral neuropathy     Thyroid disease     Thyroid disorder        Past Surgical History:   Past Surgical History:   Procedure Laterality Date    ANKLE SURGERY Right     ankle/foot fracture surgery    CATARACT EXTRACTION W/ INTRAOCULAR LENS IMPLANT Right 11/7/2022    Procedure: CATARACT PHACO EXTRACTION WITH INTRAOCULAR LENS IMPLANT RIGHT;  Surgeon: Spencer Mccollum MD;  Location: [facility];  Service: Ophthalmology;  Laterality: Right;    CATARACT EXTRACTION W/ INTRAOCULAR LENS IMPLANT Left 11/21/2022    Procedure: CATARACT PHACO EXTRACTION WITH INTRAOCULAR LENS IMPLANT LEFT;  Surgeon: Spencer Mccollum MD;  Location: [facility];  Service: Ophthalmology;  Laterality: Left;    COLON SURGERY      colon resection    COLONOSCOPY      CYSTOSCOPY TRANSURETHRAL RESECTION OF PROSTATE N/A 9/30/2022    Procedure: CYSTOSCOPY TRANSURETHRAL RESECTION OF PROSTATE (BIPOLAR);  Surgeon: Torsten Hawley MD;   "Location: [facility];  Service: Urology;  Laterality: N/A;    PROSTATE SURGERY  2022    \"uralift\" surgery    TONSILLECTOMY         Family History:   Family History   Problem Relation Age of Onset    Cancer Other     Diabetes Other     Prostate cancer Other     Leukemia Mother     Diabetes Father        Social History:   Social History     Socioeconomic History    Marital status:    Tobacco Use    Smoking status: Never     Passive exposure: Never    Smokeless tobacco: Never   Vaping Use    Vaping status: Never Used   Substance and Sexual Activity    Alcohol use: Not Currently    Drug use: No    Sexual activity: Defer       Current Medications:     Current Outpatient Medications:     acetaminophen (TYLENOL) 650 MG 8 hr tablet, Every 8 (Eight) Hours As Needed., Disp: , Rfl:     Fluticasone Furoate-Vilanterol (Breo Ellipta) 100-25 MCG/ACT aerosol powder , Inhale 1 puff Daily., Disp: 180 each, Rfl: 2    levothyroxine (SYNTHROID, LEVOTHROID) 100 MCG tablet, TAKE 1 TABLET BY MOUTH DAILY, Disp: 90 tablet, Rfl: 3    NIFEdipine XL (PROCARDIA XL) 60 MG 24 hr tablet, TAKE 1 TABLET BY MOUTH DAILY, Disp: 90 tablet, Rfl: 3    Probiotic Product (PROBIOTIC-10 PO), Probiotic  1 capsule daily, Disp: , Rfl:     vitamin B-12 (CYANOCOBALAMIN) 1000 MCG tablet, Take 100 mcg by mouth Daily., Disp: , Rfl:     albuterol sulfate  (90 Base) MCG/ACT inhaler, Inhale 2 puffs Every 4 (Four) Hours As Needed for Wheezing. (Patient not taking: Reported on 8/12/2024), Disp: 18 g, Rfl: 1     Allergies:   Allergies   Allergen Reactions    Oxycodone-Acetaminophen Nausea Only and GI Intolerance    Oxycodone-Acetaminophen Nausea Only       Objective     Physical Exam:  Vital Signs:   Vitals:    08/12/24 1359   BP: 142/82   Pulse: 89   Resp: 18   SpO2: 95%   Weight: 95.7 kg (211 lb)   Height: 177.8 cm (70\")     Body mass index is 30.28 kg/m².    Physical Exam  Vitals reviewed.   Constitutional:       General: He is not in acute distress.     " Appearance: He is not toxic-appearing.   HENT:      Head: Normocephalic and atraumatic.      Mouth/Throat:      Mouth: Mucous membranes are moist.   Eyes:      Conjunctiva/sclera: Conjunctivae normal.   Cardiovascular:      Rate and Rhythm: Normal rate.      Heart sounds: Normal heart sounds.   Pulmonary:      Effort: Pulmonary effort is normal.      Breath sounds: Normal breath sounds.   Abdominal:      General: There is no distension.      Palpations: Abdomen is soft.   Musculoskeletal:         General: No deformity.      Cervical back: Neck supple.   Skin:     General: Skin is warm and dry.      Findings: No rash.   Neurological:      General: No focal deficit present.      Mental Status: He is alert and oriented to person, place, and time.   Psychiatric:         Mood and Affect: Mood normal.         Behavior: Behavior normal.       Assessment / Plan      Assessment/Plan:   Diagnoses and all orders for this visit:    1. Moderate persistent asthma without complication  -     Fluticasone Furoate-Vilanterol (Breo Ellipta) 100-25 MCG/ACT aerosol powder ; Inhale 1 puff Daily.  Dispense: 180 each; Refill: 3    We discussed the risk and benefits of inhaled corticosteroids. Patient instructed to take them on a regular basis as prescribed. Patient instructed to rinse their mouth out after each use. Patient instructed to contact their insurance company and make sure that the medications prescribed are on their formulary and the lowest cost/tier for them. They will call the clinic back if different medications need to prescribed.       Follow Up:   Return in about 1 year (around 8/12/2025) for Recheck.  The patient was counseled on diagnostic results, risks and benefits of treatment options, risk factor modifications and the importance of treatment compliance. The patient was advised to contact the clinic with concerns or worsening symptoms.     LENNY Woody   [facility]     This document has been  electronically signed by Daija Freitas, LENNY  August 12, 2024

## 2024-08-16 ENCOUNTER — TELEPHONE (OUTPATIENT)
Dept: INTERNAL MEDICINE | Facility: CLINIC | Age: 84
End: 2024-08-16
Payer: MEDICARE

## 2024-08-16 NOTE — TELEPHONE ENCOUNTER
Caller: Cipriano Montes     Relationship: [unfilled]  PATIENT    Best call back number:     What is your medical concern? LOWER RIGHT SIDE BACK PAIN NEAR KIDNEY    How long has this issue been going on? YESTERDAY     Is your provider already aware of this issue? NO    Have you been treated for this issue? NOT THIS TIME    THE PATIENT HAS HAD KIDNEY STONES BEFORE.  THE PATIENT WANTS TO KNOW IF THIS COULD BE A STONE.  THE PATIENT STATES THE PAIN WAS SEVERE YESTERDAY, BUT  NOT AS PAINFUL TODAY.    THE PATIENT WOULD LIKE A CALL BACK TO DISCUSS IF HE NEEDS A SCAN.

## 2024-08-27 ENCOUNTER — TELEPHONE (OUTPATIENT)
Dept: INTERNAL MEDICINE | Facility: CLINIC | Age: 84
End: 2024-08-27
Payer: MEDICARE

## 2024-08-27 NOTE — TELEPHONE ENCOUNTER
Caller: David Montes    Relationship: Emergency Contact    Best call back number: [phone]    What orders are you requesting (i.e. lab or imaging): LAB    In what timeframe would the patient need to come in: PRIOR TO NEXT APPT.  THE PATIENT WOULD LIKE TO HAVE THEM DONE SO THAT THE PROVIDER HAS THE RESULTS FOR HIS APPT.     Where will you receive your lab/imaging services:      Additional notes: PLEASE CALL TO LET DAVID KNOW WHEN THE ORDER HAS BEEN PUT IN.          History of throat surgery  abscess 1986

## 2024-08-27 NOTE — TELEPHONE ENCOUNTER
"Relay     \"Called patient with no answer, left a voicemail for patient informing of Dr. Velazquez's message. \"No new labs needed before next visit, just done, and will discuss.\"                 "

## 2024-08-30 ENCOUNTER — OFFICE VISIT (OUTPATIENT)
Dept: INTERNAL MEDICINE | Facility: CLINIC | Age: 84
End: 2024-08-30
Payer: MEDICARE

## 2024-08-30 VITALS
BODY MASS INDEX: 31.64 KG/M2 | TEMPERATURE: 97.1 F | HEART RATE: 83 BPM | SYSTOLIC BLOOD PRESSURE: 144 MMHG | DIASTOLIC BLOOD PRESSURE: 80 MMHG | OXYGEN SATURATION: 97 % | WEIGHT: 221 LBS | RESPIRATION RATE: 16 BRPM | HEIGHT: 70 IN

## 2024-08-30 DIAGNOSIS — E03.9 ACQUIRED HYPOTHYROIDISM: ICD-10-CM

## 2024-08-30 DIAGNOSIS — R22.32 MASS OF SKIN OF LEFT THUMB: Primary | ICD-10-CM

## 2024-08-30 DIAGNOSIS — G47.33 OSA (OBSTRUCTIVE SLEEP APNEA): ICD-10-CM

## 2024-08-30 NOTE — PROGRESS NOTES
Subjective   The ABCs of the Annual Wellness Visit  Medicare Wellness Visit      Cipriano Montes is a 84 y.o. patient who presents for a Medicare Wellness Visit.    The following portions of the patient's history were reviewed and   updated as appropriate: allergies, current medications, past family history, past medical history, past social history, past surgical history, and problem list.    Compared to one year ago, the patient's physical   health is worse.  Compared to one year ago, the patient's mental   health is the same.    Recent Hospitalizations:  He was not admitted to the hospital during the last year.     Current Medical Providers:  Patient Care Team:  Ron Velazquez MD as PCP - General (Internal Medicine)    Outpatient Medications Prior to Visit   Medication Sig Dispense Refill    acetaminophen (TYLENOL) 650 MG 8 hr tablet Every 8 (Eight) Hours As Needed.      albuterol sulfate  (90 Base) MCG/ACT inhaler Inhale 2 puffs Every 4 (Four) Hours As Needed for Wheezing. 18 g 1    Fluticasone Furoate-Vilanterol (Breo Ellipta) 100-25 MCG/ACT aerosol powder  Inhale 1 puff Daily. 180 each 3    levothyroxine (SYNTHROID, LEVOTHROID) 100 MCG tablet TAKE 1 TABLET BY MOUTH DAILY 90 tablet 3    NIFEdipine XL (PROCARDIA XL) 60 MG 24 hr tablet TAKE 1 TABLET BY MOUTH DAILY 90 tablet 3    Probiotic Product (PROBIOTIC-10 PO) Probiotic   1 capsule daily      vitamin B-12 (CYANOCOBALAMIN) 1000 MCG tablet Take 100 mcg by mouth Daily.       No facility-administered medications prior to visit.     No opioid medication identified on active medication list. I have reviewed chart for other potential  high risk medication/s and harmful drug interactions in the elderly.      Aspirin is not on active medication list.  Aspirin use is not indicated based on review of current medical condition/s. Risk of harm outweighs potential benefits.  .    Patient Active Problem List   Diagnosis    Hypothyroidism    Essential hypertension  "   Hyperlipidemia    Hypoxia    Benign prostatic hyperplasia    Asthma    Acute renal failure    Vitamin B12 deficiency    Vitamin D deficiency    Dyspnea on exertion    Moderate persistent asthma    Cough    Exposure to COVID-19 virus    Hx of fracture of ankle    Polyneuropathy    Alkaline phosphatase raised    Chest discomfort    Clostridioides difficile infection    Fatigue    Gastritis    History of asthma    History of methicillin resistant Staphylococcus aureus infection    History of partial surgical removal of colon    History of renal calculi    Increased frequency of urination    Mixed anxiety and depressive disorder    Obesity    Primary erectile dysfunction    Stage 3 chronic kidney disease    Well adult health check    Tremor, essential    Vitamin B6 deficiency    Benign prostatic hyperplasia with urinary obstruction    Nuclear sclerotic cataract of right eye    Congestion of nasal sinus     Advance Care Planning Advance Directive is not on file.  ACP discussion was held with the patient during this visit. Patient has an advance directive (not in EMR), copy requested.            Objective   Vitals:    08/30/24 1303   BP: 144/80   Pulse: 83   Resp: 16   Temp: 97.1 °F (36.2 °C)   SpO2: 97%   Weight: 100 kg (221 lb)   Height: 177.8 cm (70\")   PainSc: 0-No pain       Estimated body mass index is 31.71 kg/m² as calculated from the following:    Height as of this encounter: 177.8 cm (70\").    Weight as of this encounter: 100 kg (221 lb).    BMI is >= 30 and <35. (Class 1 Obesity). The following options were offered after discussion;: weight loss educational material (shared in after visit summary), exercise counseling/recommendations, and nutrition counseling/recommendations       Does the patient have evidence of cognitive impairment? No  Lab Results   Component Value Date    HGBA1C 6.8 (H) 08/08/2024                                                                                                Health  Risk " Assessment    Smoking Status:  Social History     Tobacco Use   Smoking Status Never    Passive exposure: Never   Smokeless Tobacco Never     Alcohol Consumption:  Social History     Substance and Sexual Activity   Alcohol Use Not Currently       Fall Risk Screen  STEADI Fall Risk Assessment was completed, and patient is at HIGH risk for falls. Assessment completed on:2024    Depression Screenin/30/2024     1:07 PM   PHQ-2/PHQ-9 Depression Screening   Little Interest or Pleasure in Doing Things 0-->not at all   Feeling Down, Depressed or Hopeless 1-->several days   PHQ-9: Brief Depression Severity Measure Score 1     Health Habits and Functional and Cognitive Screenin/30/2024     1:05 PM   Functional & Cognitive Status   Do you have difficulty preparing food and eating? No   Do you have difficulty bathing yourself, getting dressed or grooming yourself? No   Do you have difficulty using the toilet? No   Do you have difficulty moving around from place to place? No   Do you have trouble with steps or getting out of a bed or a chair? Yes   Current Diet Well Balanced Diet   Dental Exam Up to date   Eye Exam Up to date   Exercise (times per week) 2 times per week   Current Exercises Include Walking        Exercise Comment Golfing   Do you need help using the phone?  No   Are you deaf or do you have serious difficulty hearing?  No   Do you need help to go to places out of walking distance? No   Do you need help shopping? No   Do you need help preparing meals?  No   Do you need help with housework?  No   Do you need help with laundry? No   Do you need help taking your medications? No   Do you need help managing money? No   Do you ever drive or ride in a car without wearing a seat belt? No   Have you felt unusual stress, anger or loneliness in the last month? Yes   Who do you live with? Spouse   If you need help, do you have trouble finding someone available to you? No   Have you been bothered in the  last four weeks by sexual problems? No   Do you have difficulty concentrating, remembering or making decisions? No           Age-appropriate Screening Schedule:  Refer to the list below for future screening recommendations based on patient's age, sex and/or medical conditions. Orders for these recommended tests are listed in the plan section. The patient has been provided with a written plan.    Health Maintenance List  Health Maintenance   Topic Date Due    DIABETIC EYE EXAM  01/16/2016    Pneumococcal Vaccine 65+ (2 of 2 - PCV) 01/05/2019    ANNUAL WELLNESS VISIT  11/09/2021    BMI FOLLOWUP  10/04/2023    LIPID PANEL  05/30/2024    URINE MICROALBUMIN  05/30/2024    TDAP/TD VACCINES (1 - Tdap) 08/30/2024 (Originally 3/16/1959)    ZOSTER VACCINE (1 of 2) 08/30/2024 (Originally 3/16/1992)    COVID-19 Vaccine (4 - 2023-24 season) 09/01/2024 (Originally 9/1/2023)    INFLUENZA VACCINE  03/31/2025 (Originally 8/1/2024)    RSV Vaccine - Adults (1 - 1-dose 60+ series) 08/30/2025 (Originally 3/16/2002)    HEMOGLOBIN A1C  02/08/2025    COLORECTAL CANCER SCREENING  08/29/2029                                                                                                                                                CMS Preventative Services Quick Reference  Risk Factors Identified During Encounter  Chronic Pain: Natural history and expected course discussed. Questions answered.  Fall Risk-High or Moderate: Discussed Fall Prevention in the home  Inactivity/Sedentary: Patient was advised to exercise at least 150 minutes a week per CDC recommendations.    The above risks/problems have been discussed with the patient.  Pertinent information has been shared with the patient in the After Visit Summary.  An After Visit Summary and PPPS were made available to the patient.    Follow Up:   Next Medicare Wellness visit to be scheduled in 1 year.         Additional E&M Note during same encounter follows:  Patient has additional,  "significant, and separately identifiable condition(s)/problem(s) that require work above and beyond the Medicare Wellness Visit     Chief Complaint  Medicare Wellness-subsequent    Subjective   HPI  Cipriano is also being seen today for an annual adult preventative physical exam.                 Objective   Vital Signs:  /80   Pulse 83   Temp 97.1 °F (36.2 °C)   Resp 16   Ht 177.8 cm (70\")   Wt 100 kg (221 lb)   SpO2 97%   BMI 31.71 kg/m²   Physical Exam    The following data was reviewed by: Ron Velazquez MD on 08/30/2024:        Assessment and Plan Additional age appropriate preventative wellness advice topics were discussed during today's preventative wellness exam(some topics already addressed during AWV portion of the note above):    Physical Activity: Advised cardiovascular activity 150 minutes per week as tolerated. (example brisk walk for 30 minutes, 5 days a week).     Nutrition: Discussed nutrition plan with patient. Information shared in after visit summary. Goal is for a well balanced diet to enhance overall health.     Healthy Weight: Discussed current and goal BMI with patient. Steps to attain this goal discussed. Information shared in after visit summary.                No orders of the defined types were placed in this encounter.          I spent 12   minutes caring for Cipriano on this date of service. This time includes time spent by me in the following activities:preparing for the visit, reviewing tests, and obtaining and/or reviewing a separately obtained history  Follow Up   No follow-ups on file.  Patient was given instructions and counseling regarding his condition or for health maintenance advice. Please see specific information pulled into the AVS if appropriate.  "

## 2024-08-30 NOTE — PROGRESS NOTES
"Subjective     Patient ID: Cipriano Montes is a 84 y.o. male. Patient is here for management of multiple medical problems.     Chief Complaint   Patient presents with    Medicare Wellness-subsequent     History of Present Illness   Thumb mass.      The following portions of the patient's history were reviewed and updated as appropriate: allergies, current medications, past family history, past medical history, past social history, past surgical history and problem list.    Review of Systems    Current Outpatient Medications:     acetaminophen (TYLENOL) 650 MG 8 hr tablet, Every 8 (Eight) Hours As Needed., Disp: , Rfl:     albuterol sulfate  (90 Base) MCG/ACT inhaler, Inhale 2 puffs Every 4 (Four) Hours As Needed for Wheezing., Disp: 18 g, Rfl: 1    Fluticasone Furoate-Vilanterol (Breo Ellipta) 100-25 MCG/ACT aerosol powder , Inhale 1 puff Daily., Disp: 180 each, Rfl: 3    levothyroxine (SYNTHROID, LEVOTHROID) 100 MCG tablet, TAKE 1 TABLET BY MOUTH DAILY, Disp: 90 tablet, Rfl: 3    NIFEdipine XL (PROCARDIA XL) 60 MG 24 hr tablet, TAKE 1 TABLET BY MOUTH DAILY, Disp: 90 tablet, Rfl: 3    Probiotic Product (PROBIOTIC-10 PO), Probiotic  1 capsule daily, Disp: , Rfl:     vitamin B-12 (CYANOCOBALAMIN) 1000 MCG tablet, Take 100 mcg by mouth Daily., Disp: , Rfl:     Objective      Blood pressure 144/80, pulse 83, temperature 97.1 °F (36.2 °C), resp. rate 16, height 177.8 cm (70\"), weight 100 kg (221 lb), SpO2 97%.    BMI is >= 30 and <35. (Class 1 Obesity). The following options were offered after discussion;: weight loss educational material (shared in after visit summary), exercise counseling/recommendations, and nutrition counseling/recommendations       Physical Exam     General Appearance:    Alert, cooperative, no distress, appears stated age   Head:    Normocephalic, without obvious abnormality, atraumatic   Eyes:    PERRL, conjunctiva/corneas clear, EOM's intact   Ears:    Normal TM's and external ear canals, " both ears   Nose:   Nares normal, septum midline, mucosa normal, no drainage   or sinus tenderness   Throat:   Lips, mucosa, and tongue normal; teeth and gums normal   Neck:   Supple, symmetrical, trachea midline, no adenopathy;        thyroid:  No enlargement/tenderness/nodules; no carotid    bruit or JVD   Back:     Symmetric, no curvature, ROM normal, no CVA tenderness   Lungs:     Clear to auscultation bilaterally, respirations unlabored   Chest wall:    No tenderness or deformity   Heart:    Regular rate and rhythm, S1 and S2 normal, no murmur,        rub or gallop   Abdomen:     Soft, non-tender, bowel sounds active all four quadrants,     no masses, no organomegaly   Extremities:   Right leg edema  +2.   Extremities normal, atraumatic, no cyanosis or edema   Pulses:   2+ and symmetric all extremities   Skin:   Skin color, texture, turgor normal, no rashes or lesions   Lymph nodes:   Cervical, supraclavicular, and axillary nodes normal   Neurologic:   CNII-XII intact. Normal strength, sensation and reflexes       throughout      Results for orders placed or performed in visit on 06/07/23   Cadmium, Blood    Specimen: Blood   Result Value Ref Range    Cadmium <0.5 0.0 - 1.2 ug/L         Assessment & Plan   Right leg swelling.   Numbness.   Hx of trauma. Coumpound fx ankle     On nifedipine xl 60mg.  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 Dr Hurtado neurology,  pt now with [facility]. Actively getting worked up.     Tired all the time. Getting worse. Nocturnal hypoxia. O2 less than 88% 178 min. Will check for adam        Diagnoses and all orders for this visit:    1. Mass of skin of left thumb (Primary)  -     Ambulatory Referral to Orthopedic Surgery    2. ADAM (obstructive sleep apnea)  -     Home Sleep Study    3. Acquired hypothyroidism      No follow-ups on file.          There are no Patient Instructions on file for this visit.     Ron Velazquez MD    Assessment & Plan

## 2024-09-23 ENCOUNTER — OFFICE VISIT (OUTPATIENT)
Dept: ORTHOPEDIC SURGERY | Facility: CLINIC | Age: 84
End: 2024-09-23
Payer: MEDICARE

## 2024-09-23 VITALS
BODY MASS INDEX: 31.64 KG/M2 | WEIGHT: 221 LBS | HEIGHT: 70 IN | SYSTOLIC BLOOD PRESSURE: 142 MMHG | DIASTOLIC BLOOD PRESSURE: 82 MMHG

## 2024-09-23 DIAGNOSIS — M67.449 DIGITAL MUCOUS CYST OF FINGER: Primary | ICD-10-CM

## 2024-09-23 PROCEDURE — 99204 OFFICE O/P NEW MOD 45 MIN: CPT | Performed by: STUDENT IN AN ORGANIZED HEALTH CARE EDUCATION/TRAINING PROGRAM

## 2024-09-23 PROCEDURE — 1160F RVW MEDS BY RX/DR IN RCRD: CPT | Performed by: STUDENT IN AN ORGANIZED HEALTH CARE EDUCATION/TRAINING PROGRAM

## 2024-09-23 PROCEDURE — 3079F DIAST BP 80-89 MM HG: CPT | Performed by: STUDENT IN AN ORGANIZED HEALTH CARE EDUCATION/TRAINING PROGRAM

## 2024-09-23 PROCEDURE — 3077F SYST BP >= 140 MM HG: CPT | Performed by: STUDENT IN AN ORGANIZED HEALTH CARE EDUCATION/TRAINING PROGRAM

## 2024-09-23 PROCEDURE — 1159F MED LIST DOCD IN RCRD: CPT | Performed by: STUDENT IN AN ORGANIZED HEALTH CARE EDUCATION/TRAINING PROGRAM

## 2024-12-13 ENCOUNTER — OFFICE VISIT (OUTPATIENT)
Dept: INTERNAL MEDICINE | Facility: CLINIC | Age: 84
End: 2024-12-13
Payer: MEDICARE

## 2024-12-13 VITALS
HEART RATE: 84 BPM | HEIGHT: 70 IN | DIASTOLIC BLOOD PRESSURE: 80 MMHG | BODY MASS INDEX: 31.5 KG/M2 | WEIGHT: 220 LBS | OXYGEN SATURATION: 97 % | TEMPERATURE: 97.2 F | SYSTOLIC BLOOD PRESSURE: 142 MMHG | RESPIRATION RATE: 16 BRPM

## 2024-12-13 DIAGNOSIS — J01.00 SUBACUTE MAXILLARY SINUSITIS: Primary | ICD-10-CM

## 2024-12-13 DIAGNOSIS — E03.9 ACQUIRED HYPOTHYROIDISM: ICD-10-CM

## 2024-12-13 DIAGNOSIS — E11.9 TYPE 2 DIABETES MELLITUS WITHOUT COMPLICATION, WITHOUT LONG-TERM CURRENT USE OF INSULIN: ICD-10-CM

## 2024-12-13 DIAGNOSIS — Z12.5 PROSTATE CANCER SCREENING: ICD-10-CM

## 2024-12-13 PROCEDURE — 3077F SYST BP >= 140 MM HG: CPT | Performed by: INTERNAL MEDICINE

## 2024-12-13 PROCEDURE — 99214 OFFICE O/P EST MOD 30 MIN: CPT | Performed by: INTERNAL MEDICINE

## 2024-12-13 PROCEDURE — 3079F DIAST BP 80-89 MM HG: CPT | Performed by: INTERNAL MEDICINE

## 2024-12-13 PROCEDURE — 1125F AMNT PAIN NOTED PAIN PRSNT: CPT | Performed by: INTERNAL MEDICINE

## 2024-12-13 PROCEDURE — G2211 COMPLEX E/M VISIT ADD ON: HCPCS | Performed by: INTERNAL MEDICINE

## 2024-12-13 RX ORDER — LEVOTHYROXINE SODIUM 100 UG/1
100 TABLET ORAL DAILY
Qty: 90 TABLET | Refills: 3 | Status: SHIPPED | OUTPATIENT
Start: 2024-12-13

## 2024-12-13 RX ORDER — NIFEDIPINE 60 MG/1
60 TABLET, EXTENDED RELEASE ORAL DAILY
Qty: 90 TABLET | Refills: 3 | Status: SHIPPED | OUTPATIENT
Start: 2024-12-13

## 2024-12-13 RX ORDER — FLUTICASONE PROPIONATE 50 MCG
SPRAY, SUSPENSION (ML) NASAL
COMMUNITY

## 2024-12-13 NOTE — PROGRESS NOTES
"Subjective     Patient ID: Cipriano Montes is a 84 y.o. male. Patient is here for management of multiple medical problems.     Chief Complaint   Patient presents with    Sore Throat    Cough     History of Present Illness   Monday s/t.  Now in lungs. Pain in lower back with cough.    No fever.     Sinust pressure and pain.  Using otc to help.       The following portions of the patient's history were reviewed and updated as appropriate: allergies, current medications, past family history, past medical history, past social history, past surgical history and problem list.    Review of Systems    Current Outpatient Medications:     acetaminophen (TYLENOL) 650 MG 8 hr tablet, Every 8 (Eight) Hours As Needed., Disp: , Rfl:     albuterol sulfate  (90 Base) MCG/ACT inhaler, Inhale 2 puffs Every 4 (Four) Hours As Needed for Wheezing., Disp: 18 g, Rfl: 1    Fluticasone Furoate-Vilanterol (Breo Ellipta) 100-25 MCG/ACT aerosol powder , Inhale 1 puff Daily., Disp: 180 each, Rfl: 3    levothyroxine (SYNTHROID, LEVOTHROID) 100 MCG tablet, Take 1 tablet by mouth Daily., Disp: 90 tablet, Rfl: 3    NIFEdipine XL (PROCARDIA XL) 60 MG 24 hr tablet, Take 1 tablet by mouth Daily., Disp: 90 tablet, Rfl: 3    Probiotic Product (PROBIOTIC-10 PO), Probiotic  1 capsule daily, Disp: , Rfl:     vitamin B-12 (CYANOCOBALAMIN) 1000 MCG tablet, Take 100 mcg by mouth Daily., Disp: , Rfl:     amoxicillin-clavulanate (AUGMENTIN) 875-125 MG per tablet, Take 1 tablet by mouth Every 12 (Twelve) Hours., Disp: 20 tablet, Rfl: 0    fluticasone (FLONASE) 50 MCG/ACT nasal spray, SPRAY 1 SPRAY BY INTRANASAL ROUTE EVERY DAY, Disp: , Rfl:     Objective      Blood pressure 142/80, pulse 84, temperature 97.2 °F (36.2 °C), resp. rate 16, height 177.8 cm (70\"), weight 99.8 kg (220 lb), SpO2 97%.            Physical Exam     General Appearance:    Alert, cooperative, no distress, appears stated age   Head:    Normocephalic, without obvious abnormality, " atraumatic   Eyes:    PERRL, conjunctiva/corneas clear, EOM's intact   Ears:    Normal TM's and external ear canals, both ears   Nose:   Nares normal, septum midline, mucosa normal, no drainage   or sinus tenderness   Throat:   Lips, mucosa, and tongue normal; teeth and gums normal   Neck:   Supple, symmetrical, trachea midline, no adenopathy;        thyroid:  No enlargement/tenderness/nodules; no carotid    bruit or JVD   Back:     Symmetric, no curvature, ROM normal, no CVA tenderness   Lungs:     Clear to auscultation bilaterally, respirations unlabored   Chest wall:    No tenderness or deformity   Heart:    Regular rate and rhythm, S1 and S2 normal, no murmur,        rub or gallop   Abdomen:     Soft, non-tender, bowel sounds active all four quadrants,     no masses, no organomegaly   Extremities:   Extremities normal, atraumatic, no cyanosis or edema   Pulses:   2+ and symmetric all extremities   Skin:   Skin color, texture, turgor normal, no rashes or lesions   Lymph nodes:   Cervical, supraclavicular, and axillary nodes normal   Neurologic:   CNII-XII intact. Normal strength, sensation and reflexes       throughout      Results for orders placed or performed in visit on 06/07/23   Cadmium, Blood    Collection Time: 06/12/23 11:20 AM    Specimen: Blood   Result Value Ref Range    Cadmium <0.5 0.0 - 1.2 ug/L         Assessment & Plan       Diagnoses and all orders for this visit:    1. Subacute maxillary sinusitis (Primary)  -     CBC & Differential  -     Vitamin B12  -     Comprehensive Metabolic Panel  -     Lipid Panel  -     TSH  -     Hemoglobin A1c  -     MicroAlbumin, Urine, Random - Urine, Clean Catch  -     PSA Screen    2. Acquired hypothyroidism  -     levothyroxine (SYNTHROID, LEVOTHROID) 100 MCG tablet; Take 1 tablet by mouth Daily.  Dispense: 90 tablet; Refill: 3  -     CBC & Differential  -     Vitamin B12  -     Comprehensive Metabolic Panel  -     Lipid Panel  -     TSH  -     Hemoglobin  A1c  -     MicroAlbumin, Urine, Random - Urine, Clean Catch  -     PSA Screen    3. Type 2 diabetes mellitus without complication, without long-term current use of insulin  -     CBC & Differential  -     Vitamin B12  -     Comprehensive Metabolic Panel  -     Lipid Panel  -     TSH  -     Hemoglobin A1c  -     MicroAlbumin, Urine, Random - Urine, Clean Catch  -     PSA Screen    4. Prostate cancer screening  -     CBC & Differential  -     Vitamin B12  -     Comprehensive Metabolic Panel  -     Lipid Panel  -     TSH  -     Hemoglobin A1c  -     MicroAlbumin, Urine, Random - Urine, Clean Catch  -     PSA Screen    Other orders  -     NIFEdipine XL (PROCARDIA XL) 60 MG 24 hr tablet; Take 1 tablet by mouth Daily.  Dispense: 90 tablet; Refill: 3  -     amoxicillin-clavulanate (AUGMENTIN) 875-125 MG per tablet; Take 1 tablet by mouth Every 12 (Twelve) Hours.  Dispense: 20 tablet; Refill: 0      Return in about 5 months (around 5/13/2025).          There are no Patient Instructions on file for this visit.     Ron Velazquez MD    Assessment & Plan

## 2024-12-18 ENCOUNTER — TELEPHONE (OUTPATIENT)
Dept: BEHAVIORAL HEALTH | Facility: CLINIC | Age: 84
End: 2024-12-18
Payer: MEDICARE

## 2024-12-18 NOTE — TELEPHONE ENCOUNTER
PT NEEDS TO BE WORKED IN HE HAS COUGH, AND CHEST CONGESTION. HE WOULD LIKE TO BE SEEN TODAY OR TOMORROW.

## 2024-12-19 ENCOUNTER — OFFICE VISIT (OUTPATIENT)
Dept: INTERNAL MEDICINE | Facility: CLINIC | Age: 84
End: 2024-12-19
Payer: MEDICARE

## 2024-12-19 VITALS
HEART RATE: 82 BPM | DIASTOLIC BLOOD PRESSURE: 82 MMHG | HEIGHT: 70 IN | WEIGHT: 217.8 LBS | OXYGEN SATURATION: 95 % | SYSTOLIC BLOOD PRESSURE: 176 MMHG | BODY MASS INDEX: 31.18 KG/M2 | TEMPERATURE: 97.2 F

## 2024-12-19 DIAGNOSIS — J18.9 COMMUNITY ACQUIRED PNEUMONIA, UNSPECIFIED LATERALITY: Primary | ICD-10-CM

## 2024-12-19 RX ORDER — DEXTROMETHORPHAN HYDROBROMIDE AND PROMETHAZINE HYDROCHLORIDE 15; 6.25 MG/5ML; MG/5ML
5 SYRUP ORAL 4 TIMES DAILY PRN
Qty: 240 ML | Refills: 0 | Status: SHIPPED | OUTPATIENT
Start: 2024-12-19

## 2024-12-19 RX ORDER — DOXYCYCLINE 100 MG/1
100 CAPSULE ORAL 2 TIMES DAILY
Qty: 20 CAPSULE | Refills: 0 | Status: SHIPPED | OUTPATIENT
Start: 2024-12-19

## 2024-12-19 NOTE — PROGRESS NOTES
"Subjective     Patient ID: Cipriano Montes is a 84 y.o. male. Patient is here for management of multiple medical problems.     Chief Complaint   Patient presents with    URI    Cough     Pain on the front and back of his chest     History of Present Illness     The following portions of the patient's history were reviewed and updated as appropriate: allergies, current medications, past family history, past medical history, past social history, past surgical history and problem list.    Review of Systems    Current Outpatient Medications:     acetaminophen (TYLENOL) 650 MG 8 hr tablet, Every 8 (Eight) Hours As Needed., Disp: , Rfl:     albuterol sulfate  (90 Base) MCG/ACT inhaler, Inhale 2 puffs Every 4 (Four) Hours As Needed for Wheezing., Disp: 18 g, Rfl: 1    amoxicillin-clavulanate (AUGMENTIN) 875-125 MG per tablet, Take 1 tablet by mouth Every 12 (Twelve) Hours., Disp: 20 tablet, Rfl: 0    fluticasone (FLONASE) 50 MCG/ACT nasal spray, SPRAY 1 SPRAY BY INTRANASAL ROUTE EVERY DAY, Disp: , Rfl:     Fluticasone Furoate-Vilanterol (Breo Ellipta) 100-25 MCG/ACT aerosol powder , Inhale 1 puff Daily., Disp: 180 each, Rfl: 3    levothyroxine (SYNTHROID, LEVOTHROID) 100 MCG tablet, Take 1 tablet by mouth Daily., Disp: 90 tablet, Rfl: 3    NIFEdipine XL (PROCARDIA XL) 60 MG 24 hr tablet, Take 1 tablet by mouth Daily., Disp: 90 tablet, Rfl: 3    Probiotic Product (PROBIOTIC-10 PO), Probiotic  1 capsule daily, Disp: , Rfl:     vitamin B-12 (CYANOCOBALAMIN) 1000 MCG tablet, Take 100 mcg by mouth Daily., Disp: , Rfl:     doxycycline (VIBRAMYCIN) 100 MG capsule, Take 1 capsule by mouth 2 (Two) Times a Day., Disp: 20 capsule, Rfl: 0    promethazine-dextromethorphan (PROMETHAZINE-DM) 6.25-15 MG/5ML syrup, Take 5 mL by mouth 4 (Four) Times a Day As Needed for Cough., Disp: 240 mL, Rfl: 0    Objective      Blood pressure 176/82, pulse 82, temperature 97.2 °F (36.2 °C), height 177.8 cm (70\"), weight 98.8 kg (217 lb 12.8 oz), " SpO2 95%.            Physical Exam     General Appearance:    Alert, cooperative, no distress, appears stated age   Head:    Normocephalic, without obvious abnormality, atraumatic   Eyes:    PERRL, conjunctiva/corneas clear, EOM's intact   Ears:    Normal TM's and external ear canals, both ears   Nose:   Nares normal, septum midline, mucosa normal, no drainage   or sinus tenderness   Throat:   Lips, mucosa, and tongue normal; teeth and gums normal   Neck:   Supple, symmetrical, trachea midline, no adenopathy;        thyroid:  No enlargement/tenderness/nodules; no carotid    bruit or JVD   Back:     Symmetric, no curvature, ROM normal, no CVA tenderness   Lungs:     Clear to auscultation bilaterally, respirations unlabored   Chest wall:    No tenderness or deformity   Heart:    Regular rate and rhythm, S1 and S2 normal, no murmur,        rub or gallop   Abdomen:     Soft, non-tender, bowel sounds active all four quadrants,     no masses, no organomegaly   Extremities:   Extremities normal, atraumatic, no cyanosis or edema   Pulses:   2+ and symmetric all extremities   Skin:   Skin color, texture, turgor normal, no rashes or lesions   Lymph nodes:   Cervical, supraclavicular, and axillary nodes normal   Neurologic:   CNII-XII intact. Normal strength, sensation and reflexes       throughout      Results for orders placed or performed in visit on 06/07/23   Cadmium, Blood    Collection Time: 06/12/23 11:20 AM    Specimen: Blood   Result Value Ref Range    Cadmium <0.5 0.0 - 1.2 ug/L         Assessment & Plan     Pt had elevated temp yesterday. Worsening cough. Worsening wheezind.  Add doxy to cover for atypical cap.    Diagnoses and all orders for this visit:    1. Community acquired pneumonia, unspecified laterality (Primary)    Other orders  -     doxycycline (VIBRAMYCIN) 100 MG capsule; Take 1 capsule by mouth 2 (Two) Times a Day.  Dispense: 20 capsule; Refill: 0  -     promethazine-dextromethorphan (PROMETHAZINE-DM)  6.25-15 MG/5ML syrup; Take 5 mL by mouth 4 (Four) Times a Day As Needed for Cough.  Dispense: 240 mL; Refill: 0      No follow-ups on file.          There are no Patient Instructions on file for this visit.     Ron Velazquez MD    Assessment & Plan

## 2025-03-12 ENCOUNTER — LAB (OUTPATIENT)
Dept: LAB | Facility: HOSPITAL | Age: 85
End: 2025-03-12
Payer: MEDICARE

## 2025-03-12 ENCOUNTER — HOSPITAL ENCOUNTER (OUTPATIENT)
Dept: GENERAL RADIOLOGY | Facility: HOSPITAL | Age: 85
Discharge: HOME | End: 2025-03-12
Payer: MEDICARE

## 2025-03-12 ENCOUNTER — OFFICE VISIT (OUTPATIENT)
Dept: INTERNAL MEDICINE | Facility: CLINIC | Age: 85
End: 2025-03-12
Payer: MEDICARE

## 2025-03-12 ENCOUNTER — HOSPITAL ENCOUNTER (OUTPATIENT)
Dept: ULTRASOUND IMAGING | Facility: HOSPITAL | Age: 85
Discharge: HOME | End: 2025-03-12
Payer: MEDICARE

## 2025-03-12 VITALS
SYSTOLIC BLOOD PRESSURE: 148 MMHG | TEMPERATURE: 97.2 F | HEIGHT: 70 IN | DIASTOLIC BLOOD PRESSURE: 72 MMHG | WEIGHT: 216 LBS | OXYGEN SATURATION: 97 % | RESPIRATION RATE: 16 BRPM | HEART RATE: 66 BPM | BODY MASS INDEX: 30.92 KG/M2

## 2025-03-12 DIAGNOSIS — M79.604 RIGHT LEG PAIN: ICD-10-CM

## 2025-03-12 DIAGNOSIS — M79.671 RIGHT FOOT PAIN: ICD-10-CM

## 2025-03-12 DIAGNOSIS — M79.604 RIGHT LEG PAIN: Primary | ICD-10-CM

## 2025-03-12 LAB
BASOPHILS # BLD AUTO: 0.08 10*3/MM3 (ref 0–0.2)
BASOPHILS NFR BLD AUTO: 0.9 % (ref 0–1.5)
DEPRECATED RDW RBC AUTO: 41.6 FL (ref 37–54)
EOSINOPHIL # BLD AUTO: 0.25 10*3/MM3 (ref 0–0.4)
EOSINOPHIL NFR BLD AUTO: 2.9 % (ref 0.3–6.2)
ERYTHROCYTE [DISTWIDTH] IN BLOOD BY AUTOMATED COUNT: 13.1 % (ref 12.3–15.4)
HCT VFR BLD AUTO: 51.4 % (ref 37.5–51)
HGB BLD-MCNC: 16.7 G/DL (ref 13–17.7)
IMM GRANULOCYTES # BLD AUTO: 0.03 10*3/MM3 (ref 0–0.05)
IMM GRANULOCYTES NFR BLD AUTO: 0.3 % (ref 0–0.5)
LYMPHOCYTES # BLD AUTO: 1.94 10*3/MM3 (ref 0.7–3.1)
LYMPHOCYTES NFR BLD AUTO: 22.5 % (ref 19.6–45.3)
MCH RBC QN AUTO: 28.6 PG (ref 26.6–33)
MCHC RBC AUTO-ENTMCNC: 32.5 G/DL (ref 31.5–35.7)
MCV RBC AUTO: 88 FL (ref 79–97)
MONOCYTES # BLD AUTO: 0.75 10*3/MM3 (ref 0.1–0.9)
MONOCYTES NFR BLD AUTO: 8.7 % (ref 5–12)
NEUTROPHILS NFR BLD AUTO: 5.58 10*3/MM3 (ref 1.7–7)
NEUTROPHILS NFR BLD AUTO: 64.7 % (ref 42.7–76)
NRBC BLD AUTO-RTO: 0 /100 WBC (ref 0–0.2)
PLATELET # BLD AUTO: 273 10*3/MM3 (ref 140–450)
PMV BLD AUTO: 10 FL (ref 6–12)
RBC # BLD AUTO: 5.84 10*6/MM3 (ref 4.14–5.8)
VIT B12 BLD-MCNC: 1025 PG/ML (ref 211–946)
WBC NRBC COR # BLD AUTO: 8.63 10*3/MM3 (ref 3.4–10.8)

## 2025-03-12 PROCEDURE — 93971 EXTREMITY STUDY: CPT

## 2025-03-12 PROCEDURE — 80053 COMPREHEN METABOLIC PANEL: CPT | Performed by: INTERNAL MEDICINE

## 2025-03-12 PROCEDURE — G0103 PSA SCREENING: HCPCS | Performed by: INTERNAL MEDICINE

## 2025-03-12 PROCEDURE — 82607 VITAMIN B-12: CPT | Performed by: INTERNAL MEDICINE

## 2025-03-12 PROCEDURE — 83036 HEMOGLOBIN GLYCOSYLATED A1C: CPT | Performed by: INTERNAL MEDICINE

## 2025-03-12 PROCEDURE — 80061 LIPID PANEL: CPT | Performed by: INTERNAL MEDICINE

## 2025-03-12 PROCEDURE — 82043 UR ALBUMIN QUANTITATIVE: CPT | Performed by: INTERNAL MEDICINE

## 2025-03-12 PROCEDURE — 73630 X-RAY EXAM OF FOOT: CPT

## 2025-03-12 PROCEDURE — 85025 COMPLETE CBC W/AUTO DIFF WBC: CPT | Performed by: INTERNAL MEDICINE

## 2025-03-12 PROCEDURE — 84443 ASSAY THYROID STIM HORMONE: CPT | Performed by: INTERNAL MEDICINE

## 2025-03-12 NOTE — PROGRESS NOTES
"Subjective     Patient ID: Cipriano Montes is a 84 y.o. male. Patient is here for management of multiple medical problems.     Chief Complaint   Patient presents with    Leg Pain     Right     Ankle Pain     right    Foot Pain     Right     Leg Swelling     Right leg swelling       History of Present Illness   Right great toe injury 10 days ago. Now with worsening toe pain and now lower lag pain.  Now edema.      The following portions of the patient's history were reviewed and updated as appropriate: allergies, current medications, past family history, past medical history, past social history, past surgical history and problem list.    Review of Systems    Current Outpatient Medications:     acetaminophen (TYLENOL) 650 MG 8 hr tablet, Every 8 (Eight) Hours As Needed., Disp: , Rfl:     albuterol sulfate  (90 Base) MCG/ACT inhaler, Inhale 2 puffs Every 4 (Four) Hours As Needed for Wheezing., Disp: 18 g, Rfl: 1    fluticasone (FLONASE) 50 MCG/ACT nasal spray, SPRAY 1 SPRAY BY INTRANASAL ROUTE EVERY DAY, Disp: , Rfl:     Fluticasone Furoate-Vilanterol (Breo Ellipta) 100-25 MCG/ACT aerosol powder , Inhale 1 puff Daily., Disp: 180 each, Rfl: 3    levothyroxine (SYNTHROID, LEVOTHROID) 100 MCG tablet, Take 1 tablet by mouth Daily., Disp: 90 tablet, Rfl: 3    NIFEdipine XL (PROCARDIA XL) 60 MG 24 hr tablet, Take 1 tablet by mouth Daily., Disp: 90 tablet, Rfl: 3    Probiotic Product (PROBIOTIC-10 PO), Probiotic  1 capsule daily, Disp: , Rfl:     vitamin B-12 (CYANOCOBALAMIN) 1000 MCG tablet, Take 100 mcg by mouth Daily., Disp: , Rfl:     Objective      Blood pressure 148/72, pulse 66, temperature 97.2 °F (36.2 °C), resp. rate 16, height 177.8 cm (70\"), weight 98 kg (216 lb), SpO2 97%.            Physical Exam     General Appearance:    Alert, cooperative, no distress, appears stated age   Head:    Normocephalic, without obvious abnormality, atraumatic   Eyes:    PERRL, conjunctiva/corneas clear, EOM's intact   Ears:    " Normal TM's and external ear canals, both ears   Nose:   Nares normal, septum midline, mucosa normal, no drainage   or sinus tenderness   Throat:   Lips, mucosa, and tongue normal; teeth and gums normal   Neck:   Supple, symmetrical, trachea midline, no adenopathy;        thyroid:  No enlargement/tenderness/nodules; no carotid    bruit or JVD   Back:     Symmetric, no curvature, ROM normal, no CVA tenderness   Lungs:     Clear to auscultation bilaterally, respirations unlabored   Chest wall:    No tenderness or deformity   Heart:    Regular rate and rhythm, S1 and S2 normal, no murmur,        rub or gallop   Abdomen:     Soft, non-tender, bowel sounds active all four quadrants,     no masses, no organomegaly   Extremities:   +2 edema.  Hx of surgery and scar on right ankl.e toe is deformed and erythema sn selling.    Extremities normal, atraumatic, no cyanosis or edema   Pulses:   2+ and symmetric all extremities   Skin:   Skin color, texture, turgor normal, no rashes or lesions   Lymph nodes:   Cervical, supraclavicular, and axillary nodes normal   Neurologic:   CNII-XII intact. Normal strength, sensation and reflexes       throughout      Results for orders placed or performed in visit on 06/07/23   Cadmium, Blood    Collection Time: 06/12/23 11:20 AM    Specimen: Blood   Result Value Ref Range    Cadmium <0.5 0.0 - 1.2 ug/L         Assessment & Plan             Diagnoses and all orders for this visit:    1. Right leg pain (Primary)  -     Duplex Venous Lower Extremity - Right CAR; Future  -     Ambulatory Referral to Podiatry  -     XR Foot 3+ View Right; Future    2. Right foot pain  -     Duplex Venous Lower Extremity - Right CAR; Future  -     Ambulatory Referral to Podiatry  -     XR Foot 3+ View Right; Future      Return in about 6 weeks (around 4/23/2025).          There are no Patient Instructions on file for this visit.     Ron Velazquez MD    Assessment & Plan

## 2025-03-13 LAB
ALBUMIN SERPL-MCNC: 4.1 G/DL (ref 3.5–5.2)
ALBUMIN/GLOB SERPL: 1.2 G/DL
ALP SERPL-CCNC: 122 U/L (ref 39–117)
ALT SERPL W P-5'-P-CCNC: 25 U/L (ref 1–41)
ANION GAP SERPL CALCULATED.3IONS-SCNC: 17.2 MMOL/L (ref 5–15)
AST SERPL-CCNC: 25 U/L (ref 1–40)
BILIRUB SERPL-MCNC: 0.5 MG/DL (ref 0–1.2)
BUN SERPL-MCNC: 18 MG/DL (ref 8–23)
BUN/CREAT SERPL: 14.1 (ref 7–25)
CALCIUM SPEC-SCNC: 9.6 MG/DL (ref 8.6–10.5)
CHLORIDE SERPL-SCNC: 105 MMOL/L (ref 98–107)
CHOLEST SERPL-MCNC: 186 MG/DL (ref 0–200)
CO2 SERPL-SCNC: 22.8 MMOL/L (ref 22–29)
CREAT SERPL-MCNC: 1.28 MG/DL (ref 0.76–1.27)
EGFRCR SERPLBLD CKD-EPI 2021: 55.9 ML/MIN/1.73
GLOBULIN UR ELPH-MCNC: 3.4 GM/DL
GLUCOSE SERPL-MCNC: 117 MG/DL (ref 65–99)
HBA1C MFR BLD: 6.9 % (ref 4.8–5.6)
HDLC SERPL-MCNC: 35 MG/DL (ref 40–60)
LDLC SERPL CALC-MCNC: 121 MG/DL (ref 0–100)
LDLC/HDLC SERPL: 3.37 {RATIO}
POTASSIUM SERPL-SCNC: 4.3 MMOL/L (ref 3.5–5.2)
PROT SERPL-MCNC: 7.5 G/DL (ref 6–8.5)
PSA SERPL-MCNC: 1.83 NG/ML (ref 0–4)
SODIUM SERPL-SCNC: 145 MMOL/L (ref 136–145)
TRIGL SERPL-MCNC: 165 MG/DL (ref 0–150)
TSH SERPL DL<=0.05 MIU/L-ACNC: 1.29 UIU/ML (ref 0.27–4.2)
VLDLC SERPL-MCNC: 30 MG/DL (ref 5–40)

## 2025-03-14 LAB — MICROALBUMIN UR-MCNC: 68.5 UG/ML

## 2025-04-04 RX ORDER — FLUTICASONE PROPIONATE AND SALMETEROL 250; 50 UG/1; UG/1
1 POWDER RESPIRATORY (INHALATION)
Qty: 60 EACH | Refills: 5 | Status: SHIPPED | OUTPATIENT
Start: 2025-04-04

## 2025-04-23 ENCOUNTER — OFFICE VISIT (OUTPATIENT)
Dept: INTERNAL MEDICINE | Facility: CLINIC | Age: 85
End: 2025-04-23
Payer: MEDICARE

## 2025-04-23 VITALS
BODY MASS INDEX: 30.64 KG/M2 | DIASTOLIC BLOOD PRESSURE: 78 MMHG | SYSTOLIC BLOOD PRESSURE: 142 MMHG | WEIGHT: 214 LBS | HEART RATE: 84 BPM | OXYGEN SATURATION: 94 % | HEIGHT: 70 IN | TEMPERATURE: 97.7 F

## 2025-04-23 DIAGNOSIS — E53.8 VITAMIN B12 DEFICIENCY: ICD-10-CM

## 2025-04-23 DIAGNOSIS — E53.1 VITAMIN B6 DEFICIENCY: ICD-10-CM

## 2025-04-23 DIAGNOSIS — E03.9 ACQUIRED HYPOTHYROIDISM: ICD-10-CM

## 2025-04-23 DIAGNOSIS — E55.9 VITAMIN D DEFICIENCY: ICD-10-CM

## 2025-04-23 DIAGNOSIS — E11.9 TYPE 2 DIABETES MELLITUS WITHOUT COMPLICATION, WITHOUT LONG-TERM CURRENT USE OF INSULIN: Primary | ICD-10-CM

## 2025-04-23 NOTE — PROGRESS NOTES
"Subjective     Patient ID: Cipriano Montes is a 85 y.o. male. Patient is here for management of multiple medical problems.     Chief Complaint   Patient presents with    Follow-up     6 week check  Podiatry seen, toe was fractured. Injection helped     History of Present Illness       Seen podiety.    Had steroid inj. Doing much better.      Leg strength.   Weakness.   Abnormal NCV. Seen Dr Campo and  neurology.  Nerve compression found in l-spine.        The following portions of the patient's history were reviewed and updated as appropriate: allergies, current medications, past family history, past medical history, past social history, past surgical history and problem list.    Review of Systems    Current Outpatient Medications:     acetaminophen (TYLENOL) 650 MG 8 hr tablet, Every 8 (Eight) Hours As Needed., Disp: , Rfl:     albuterol sulfate  (90 Base) MCG/ACT inhaler, Inhale 2 puffs Every 4 (Four) Hours As Needed for Wheezing., Disp: 18 g, Rfl: 1    fluticasone (FLONASE) 50 MCG/ACT nasal spray, SPRAY 1 SPRAY BY INTRANASAL ROUTE EVERY DAY, Disp: , Rfl:     Fluticasone-Salmeterol (ADVAIR/WIXELA) 250-50 MCG/ACT DISKUS, Inhale 1 puff 2 (Two) Times a Day. Rinse mouth out after use, Disp: 60 each, Rfl: 5    levothyroxine (SYNTHROID, LEVOTHROID) 100 MCG tablet, Take 1 tablet by mouth Daily., Disp: 90 tablet, Rfl: 3    NIFEdipine XL (PROCARDIA XL) 60 MG 24 hr tablet, Take 1 tablet by mouth Daily., Disp: 90 tablet, Rfl: 3    Probiotic Product (PROBIOTIC-10 PO), Probiotic  1 capsule daily, Disp: , Rfl:     vitamin B-12 (CYANOCOBALAMIN) 1000 MCG tablet, Take 100 mcg by mouth Daily., Disp: , Rfl:     Objective      Blood pressure 142/78, pulse 84, temperature 97.7 °F (36.5 °C), height 177.8 cm (70\"), weight 97.1 kg (214 lb), SpO2 94%.            Physical Exam     General Appearance:    Alert, cooperative, no distress, appears stated age   Head:    Normocephalic, without obvious abnormality, atraumatic   Eyes:    " PERRL, conjunctiva/corneas clear, EOM's intact   Ears:    Normal TM's and external ear canals, both ears   Nose:   Nares normal, septum midline, mucosa normal, no drainage   or sinus tenderness   Throat:   Lips, mucosa, and tongue normal; teeth and gums normal   Neck:   Supple, symmetrical, trachea midline, no adenopathy;        thyroid:  No enlargement/tenderness/nodules; no carotid    bruit or JVD   Back:     Symmetric, no curvature, ROM normal, no CVA tenderness   Lungs:     Clear to auscultation bilaterally, respirations unlabored   Chest wall:    No tenderness or deformity   Heart:    Regular rate and rhythm, S1 and S2 normal, no murmur,        rub or gallop   Abdomen:     Soft, non-tender, bowel sounds active all four quadrants,     no masses, no organomegaly   Extremities:   Extremities normal, atraumatic, no cyanosis or edema   Pulses:   2+ and symmetric all extremities   Skin:   Skin color, texture, turgor normal, no rashes or lesions   Lymph nodes:   Cervical, supraclavicular, and axillary nodes normal   Neurologic:   CNII-XII intact. Normal strength, sensation and reflexes       throughout      Results for orders placed or performed in visit on 12/13/24   MicroAlbumin, Urine, Random - Urine, Clean Catch    Collection Time: 03/12/25  3:47 PM    Specimen: Urine, Clean Catch   Result Value Ref Range    Microalbumin, Urine 68.5 Not Estab. ug/mL   Vitamin B12    Collection Time: 03/12/25  4:12 PM    Specimen: Blood   Result Value Ref Range    Vitamin B-12 1,025 (H) 211 - 946 pg/mL   Comprehensive Metabolic Panel    Collection Time: 03/12/25  4:12 PM    Specimen: Blood   Result Value Ref Range    Glucose 117 (H) 65 - 99 mg/dL    BUN 18 8 - 23 mg/dL    Creatinine 1.28 (H) 0.76 - 1.27 mg/dL    Sodium 145 136 - 145 mmol/L    Potassium 4.3 3.5 - 5.2 mmol/L    Chloride 105 98 - 107 mmol/L    CO2 22.8 22.0 - 29.0 mmol/L    Calcium 9.6 8.6 - 10.5 mg/dL    Total Protein 7.5 6.0 - 8.5 g/dL    Albumin 4.1 3.5 - 5.2 g/dL     ALT (SGPT) 25 1 - 41 U/L    AST (SGOT) 25 1 - 40 U/L    Alkaline Phosphatase 122 (H) 39 - 117 U/L    Total Bilirubin 0.5 0.0 - 1.2 mg/dL    Globulin 3.4 gm/dL    A/G Ratio 1.2 g/dL    BUN/Creatinine Ratio 14.1 7.0 - 25.0    Anion Gap 17.2 (H) 5.0 - 15.0 mmol/L    eGFR 55.9 (L) >60.0 mL/min/1.73   Lipid Panel    Collection Time: 03/12/25  4:12 PM    Specimen: Blood   Result Value Ref Range    Total Cholesterol 186 0 - 200 mg/dL    Triglycerides 165 (H) 0 - 150 mg/dL    HDL Cholesterol 35 (L) 40 - 60 mg/dL    LDL Cholesterol  121 (H) 0 - 100 mg/dL    VLDL Cholesterol 30 5 - 40 mg/dL    LDL/HDL Ratio 3.37    TSH    Collection Time: 03/12/25  4:12 PM    Specimen: Blood   Result Value Ref Range    TSH 1.290 0.270 - 4.200 uIU/mL   Hemoglobin A1c    Collection Time: 03/12/25  4:12 PM    Specimen: Blood   Result Value Ref Range    Hemoglobin A1C 6.90 (H) 4.80 - 5.60 %   PSA Screen    Collection Time: 03/12/25  4:12 PM    Specimen: Blood   Result Value Ref Range    PSA 1.830 0.000 - 4.000 ng/mL   CBC Auto Differential    Collection Time: 03/12/25  4:12 PM    Specimen: Blood   Result Value Ref Range    WBC 8.63 3.40 - 10.80 10*3/mm3    RBC 5.84 (H) 4.14 - 5.80 10*6/mm3    Hemoglobin 16.7 13.0 - 17.7 g/dL    Hematocrit 51.4 (H) 37.5 - 51.0 %    MCV 88.0 79.0 - 97.0 fL    MCH 28.6 26.6 - 33.0 pg    MCHC 32.5 31.5 - 35.7 g/dL    RDW 13.1 12.3 - 15.4 %    RDW-SD 41.6 37.0 - 54.0 fl    MPV 10.0 6.0 - 12.0 fL    Platelets 273 140 - 450 10*3/mm3    Neutrophil % 64.7 42.7 - 76.0 %    Lymphocyte % 22.5 19.6 - 45.3 %    Monocyte % 8.7 5.0 - 12.0 %    Eosinophil % 2.9 0.3 - 6.2 %    Basophil % 0.9 0.0 - 1.5 %    Immature Grans % 0.3 0.0 - 0.5 %    Neutrophils, Absolute 5.58 1.70 - 7.00 10*3/mm3    Lymphocytes, Absolute 1.94 0.70 - 3.10 10*3/mm3    Monocytes, Absolute 0.75 0.10 - 0.90 10*3/mm3    Eosinophils, Absolute 0.25 0.00 - 0.40 10*3/mm3    Basophils, Absolute 0.08 0.00 - 0.20 10*3/mm3    Immature Grans, Absolute 0.03 0.00 -  0.05 10*3/mm3    nRBC 0.0 0.0 - 0.2 /100 WBC         Assessment & Plan     Lumbar radiculopathy.    Diet going well. Wt down 210 was 220 6 months ago.    Diagnoses and all orders for this visit:    1. Type 2 diabetes mellitus without complication, without long-term current use of insulin (Primary)  -     Lipid Panel  -     CBC & Differential  -     Comprehensive Metabolic Panel  -     TSH  -     Vitamin D,25-Hydroxy  -     Hemoglobin A1c  -     Microalbumin / Creatinine Urine Ratio - Urine, Clean Catch    2. Acquired hypothyroidism  -     Lipid Panel  -     CBC & Differential  -     Comprehensive Metabolic Panel  -     TSH  -     Vitamin D,25-Hydroxy  -     Hemoglobin A1c  -     Microalbumin / Creatinine Urine Ratio - Urine, Clean Catch    3. Vitamin D deficiency  -     Lipid Panel  -     CBC & Differential  -     Comprehensive Metabolic Panel  -     TSH  -     Vitamin D,25-Hydroxy  -     Hemoglobin A1c  -     Microalbumin / Creatinine Urine Ratio - Urine, Clean Catch    4. Vitamin B6 deficiency  -     Lipid Panel  -     CBC & Differential  -     Comprehensive Metabolic Panel  -     TSH  -     Vitamin D,25-Hydroxy  -     Hemoglobin A1c  -     Microalbumin / Creatinine Urine Ratio - Urine, Clean Catch    5. Vitamin B12 deficiency  -     Lipid Panel  -     CBC & Differential  -     Comprehensive Metabolic Panel  -     TSH  -     Vitamin D,25-Hydroxy  -     Hemoglobin A1c  -     Microalbumin / Creatinine Urine Ratio - Urine, Clean Catch      Return in about 6 months (around 10/23/2025).          There are no Patient Instructions on file for this visit.     Ron Velazquez MD    Assessment & Plan

## 2025-06-24 ENCOUNTER — TRANSCRIBE ORDERS (OUTPATIENT)
Dept: ADMINISTRATIVE | Facility: HOSPITAL | Age: 85
End: 2025-06-24
Payer: MEDICARE

## 2025-06-24 DIAGNOSIS — M54.17 LUMBOSACRAL RADICULOPATHY: Primary | ICD-10-CM

## 2025-06-26 ENCOUNTER — TELEPHONE (OUTPATIENT)
Dept: INTERNAL MEDICINE | Facility: CLINIC | Age: 85
End: 2025-06-26
Payer: MEDICARE

## 2025-06-26 NOTE — TELEPHONE ENCOUNTER
Caller: VINAY HULL    Relationship: SELF    Best call back number: [phone]     What is the medical concern/diagnosis: HAVING ISSUES WITH LEGS     What specialty or service is being requested: NEUROSURGERY     What is the provider, practice or medical service name: [facility]    What is the office location: [address] [place] [address]    What is the office phone number:[phone]    Any additional details: PATIENT HAS BEEN HAVING ISSUES FOR AWHILE WITH LEGS AND IS WANTING A REFERRAL TO A NEUROSURGEON. HE HAS SOME DETEROIRATION OF THE NERVOUS SYSTEM AND NERVE COMPRESSION AND HAS BEEN RECOMMENDED TO DR[place] [address] AND HE IS WANTING A CALL BACK TO DISCUSS THIS RECOMMENDATION AND TO HEAR ANY RECOMMENDATIONS THAT DR. HARDY MAY HAVE. PLEASE CALL BACK EARLIEST CONVENIENCE.

## 2025-06-27 NOTE — TELEPHONE ENCOUNTER
Called patient and informed, he states he is scheduled in July for the MRI and will get back with us once the results are in, so referral can be placed, patient states he wants to get a neurosurgeon within [facility] if possible and would prefer Garret Isaac or whoever Dr. Velazquez would recommend.   Stable

## 2025-07-10 ENCOUNTER — HOSPITAL ENCOUNTER (OUTPATIENT)
Facility: HOSPITAL | Age: 85
Discharge: HOME | End: 2025-07-10
Admitting: STUDENT IN AN ORGANIZED HEALTH CARE EDUCATION/TRAINING PROGRAM
Payer: MEDICARE

## 2025-07-10 DIAGNOSIS — M54.17 LUMBOSACRAL RADICULOPATHY: ICD-10-CM

## 2025-07-10 PROCEDURE — 25510000002 GADOBENATE DIMEGLUMINE 529 MG/ML SOLUTION: Performed by: STUDENT IN AN ORGANIZED HEALTH CARE EDUCATION/TRAINING PROGRAM

## 2025-07-10 PROCEDURE — 72158 MRI LUMBAR SPINE W/O & W/DYE: CPT

## 2025-07-10 PROCEDURE — A9577 INJ MULTIHANCE: HCPCS | Performed by: STUDENT IN AN ORGANIZED HEALTH CARE EDUCATION/TRAINING PROGRAM

## 2025-07-10 RX ADMIN — GADOBENATE DIMEGLUMINE 20 ML: 529 INJECTION, SOLUTION INTRAVENOUS at 08:50

## 2025-07-28 ENCOUNTER — TELEPHONE (OUTPATIENT)
Dept: CARDIOLOGY | Facility: CLINIC | Age: 85
End: 2025-07-28
Payer: MEDICARE

## 2025-07-28 NOTE — TELEPHONE ENCOUNTER
Called pt to rs appointment. He said he didn't see why he needed that appointment and had canceled. I told him if he needed to rs, please reach out to us

## (undated) DEVICE — NITINOL WIRE WITH HYDROPHILIC TIP: Brand: SENSOR

## (undated) DEVICE — PK CYSTO-TUR BASIC 10

## (undated) DEVICE — HP CONCL INTREPID COAX I/A CRV .3MM

## (undated) DEVICE — SYR LUERLOK 5CC

## (undated) DEVICE — CANN IRR/INJ AIR ANT CHAMBER 6MM BEND 27G

## (undated) DEVICE — EYE CARE POST OP KIT: Brand: MEDLINE INDUSTRIES, INC.

## (undated) DEVICE — BLANKT WARM UPPR/BDY ARM/OUT 57X196CM

## (undated) DEVICE — Device

## (undated) DEVICE — GLV SURG SENSICARE PI LF PF 8 GRN STRL

## (undated) DEVICE — GOWN,NON-REINFORCED,SIRUS,SET IN SLV,XXL: Brand: MEDLINE

## (undated) DEVICE — CLEARCUT® HP2 SLIT KNIFE INTREPID MICRO-COAXIAL SYSTEM 2.4 DB: Brand: CLEARCUT®; INTREPID

## (undated) DEVICE — GLV SURG BIOGEL LTX PF 8

## (undated) DEVICE — CUTTING LOOP, BIPOLAR, 24/26 FR.: Brand: N.A.

## (undated) DEVICE — CATH FOL COUDE TIEMAN 2WY 22F 5CC

## (undated) DEVICE — BLD BEAVER MICROSHARP 15D 3MM BLU LF

## (undated) DEVICE — PLUG,CATHETER,DRAINAGE PROTECTOR,TUBE: Brand: MEDLINE

## (undated) DEVICE — SOL IRRIG H2O 1000ML STRL